# Patient Record
Sex: MALE | Race: ASIAN | NOT HISPANIC OR LATINO
[De-identification: names, ages, dates, MRNs, and addresses within clinical notes are randomized per-mention and may not be internally consistent; named-entity substitution may affect disease eponyms.]

---

## 2021-08-10 PROBLEM — Z00.00 ENCOUNTER FOR PREVENTIVE HEALTH EXAMINATION: Status: ACTIVE | Noted: 2021-08-10

## 2021-09-29 ENCOUNTER — APPOINTMENT (OUTPATIENT)
Dept: GASTROENTEROLOGY | Facility: CLINIC | Age: 61
End: 2021-09-29
Payer: COMMERCIAL

## 2021-09-29 VITALS
HEART RATE: 98 BPM | OXYGEN SATURATION: 98 % | BODY MASS INDEX: 26.48 KG/M2 | HEIGHT: 70 IN | SYSTOLIC BLOOD PRESSURE: 120 MMHG | WEIGHT: 185 LBS | DIASTOLIC BLOOD PRESSURE: 80 MMHG | TEMPERATURE: 98 F

## 2021-09-29 DIAGNOSIS — Z78.9 OTHER SPECIFIED HEALTH STATUS: ICD-10-CM

## 2021-09-29 DIAGNOSIS — R10.13 EPIGASTRIC PAIN: ICD-10-CM

## 2021-09-29 DIAGNOSIS — M1A.9XX0 CHRONIC GOUT, UNSPECIFIED, W/OUT TOPHUS (TOPHI): ICD-10-CM

## 2021-09-29 DIAGNOSIS — R14.0 ABDOMINAL DISTENSION (GASEOUS): ICD-10-CM

## 2021-09-29 PROCEDURE — 99204 OFFICE O/P NEW MOD 45 MIN: CPT

## 2021-09-29 NOTE — HISTORY OF PRESENT ILLNESS
[Nausea] : denies nausea [Vomiting] : denies vomiting [Diarrhea] : denies diarrhea [Constipation] : denies constipation [Yellow Skin Or Eyes (Jaundice)] : denies jaundice [Abdominal Pain] : denies abdominal pain [Abdominal Swelling] : denies abdominal swelling [Rectal Pain] : denies rectal pain [Wt Loss ___ Lbs] : no recent weight loss [Heartburn] : heartburn [_________] : Performed [unfilled] [de-identified] : This is a 61 year old male with no significant medical problems, who presents for evaluation of dyspepsia, bloating. \par \par He reports increased heartburn recently, manifested by just burning. Occasionally he feels some acid regurgitation. The burning has been ongoing for about 1 year. Mostly it occurs when he lays down to go to bed, and not really after meals during the day. He reports increased bloating that also has been occurring for the same amount of time as the dyspepsia. He has been taking omeprazole intermittently. He denies dysphagia. He He reports normal BMs, no diarrhea, no melena/hematochezia. He goes to bed around 10PM at night, dinner is usually around 6:30PM and he does not eat after dinner. He takes no NSAIDs. No abnormal weight loss. He has had colonoscopy 2 years ago and was told there were polyps and was told to return in 5 years. He has not had EGD before. \par \par He does eat some cheese but not mlik. No gum chewing or sodas. He eats a normal American breakfast (sausages, etc), lunch will be pizza or fast food, dinner with be meats/with some carbohydrates. No recurrent fruits or specific foods.\par \par Recent labs:\par 10/2/20\par Na 139, K 4.3, Cl 102, bciarb 25, BUN 15, Cr 1.12, Ca 9.8, T protein 7., albumin 4.4, T bili 0.7, ALP 60, AST 17, ALT 16\par  [de-identified] : Polyps were reported, no pathology report available for review. Repeat in 5 years.

## 2021-09-29 NOTE — ASSESSMENT
[FreeTextEntry1] : Impression:\par 1. Dyspepsia - no alarm symptoms; differential includes GERD, hiatal hernia, H pylori infection, functional dyspepsia; no symptoms to suggest neoplastic process\par 2. Bloating - differential includes H pylori infection, SIBO, functional symptoms, lactose intolerance\par \par Plan:\par -Given no alarm symptoms, recommended test and treat approach first for H pylori\par -Will obtain H pylori breath test\par -If negative for H pylori, trial of PPI. Discussed that he should not take anti-secretory  medications prior to H pylori testing as it will result in false negative results. Discussed that PPI should be initially taken daily on empty stomach\par -If symptoms continue, would pursue endoscopic evaluation. Discussed risks of bleeding/infection/missed lesions/perforation. He would like to pursue noninvasive testing first\par -Advised lifestyle modifications for reflux. Asked patient to avoid eating 3 hours before going to bed or laying down, and to elevate the head of his bed.\par -Advised cutting out dairy products for now\par -Repeat colonoscopy in 2023 as per previous colonoscopy report recommendations\par \par Will touch back with patient after H pylori testing to see when next follow-up would be

## 2022-03-01 ENCOUNTER — OUTPATIENT (OUTPATIENT)
Dept: OUTPATIENT SERVICES | Facility: HOSPITAL | Age: 62
LOS: 1 days | Discharge: HOME | End: 2022-03-01

## 2022-03-01 ENCOUNTER — NON-APPOINTMENT (OUTPATIENT)
Age: 62
End: 2022-03-01

## 2022-03-01 DIAGNOSIS — C16.9 MALIGNANT NEOPLASM OF STOMACH, UNSPECIFIED: ICD-10-CM

## 2022-03-02 ENCOUNTER — APPOINTMENT (OUTPATIENT)
Dept: SURGICAL ONCOLOGY | Facility: CLINIC | Age: 62
End: 2022-03-02
Payer: COMMERCIAL

## 2022-03-02 ENCOUNTER — APPOINTMENT (OUTPATIENT)
Dept: SURGICAL ONCOLOGY | Facility: HOSPITAL | Age: 62
End: 2022-03-02

## 2022-03-02 ENCOUNTER — APPOINTMENT (OUTPATIENT)
Dept: HEMATOLOGY ONCOLOGY | Facility: CLINIC | Age: 62
End: 2022-03-02
Payer: COMMERCIAL

## 2022-03-02 ENCOUNTER — APPOINTMENT (OUTPATIENT)
Dept: GASTROENTEROLOGY | Facility: CLINIC | Age: 62
End: 2022-03-02
Payer: COMMERCIAL

## 2022-03-02 VITALS
BODY MASS INDEX: 27.2 KG/M2 | SYSTOLIC BLOOD PRESSURE: 153 MMHG | DIASTOLIC BLOOD PRESSURE: 99 MMHG | TEMPERATURE: 97.9 F | HEIGHT: 70 IN | OXYGEN SATURATION: 97 % | HEART RATE: 82 BPM | WEIGHT: 190 LBS | RESPIRATION RATE: 17 BRPM

## 2022-03-02 VITALS
SYSTOLIC BLOOD PRESSURE: 154 MMHG | WEIGHT: 184.06 LBS | TEMPERATURE: 98.1 F | DIASTOLIC BLOOD PRESSURE: 85 MMHG | HEIGHT: 70.47 IN | HEART RATE: 85 BPM | RESPIRATION RATE: 17 BRPM | BODY MASS INDEX: 26.06 KG/M2 | OXYGEN SATURATION: 97 %

## 2022-03-02 PROCEDURE — 99205 OFFICE O/P NEW HI 60 MIN: CPT

## 2022-03-02 PROCEDURE — 99442: CPT

## 2022-03-02 PROCEDURE — 99204 OFFICE O/P NEW MOD 45 MIN: CPT

## 2022-03-02 RX ORDER — ALLOPURINOL 100 MG/1
100 TABLET ORAL
Refills: 0 | Status: COMPLETED | COMMUNITY
End: 2022-03-02

## 2022-03-02 NOTE — CONSULT LETTER
[FreeTextEntry2] : Dr. Juan Antonio Steinberg [FreeTextEntry3] : Jose Luis Ramirez\par (M) [phone]\par (O) [phone]

## 2022-03-02 NOTE — HISTORY OF PRESENT ILLNESS
[de-identified] : Mr. Burden is a 63 y/o male wth history of dyspepsia/acid reflux.  He was recently found to be slightly anemic on routine blood work.  He underwent his first EGD by Dr. 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

## 2022-03-02 NOTE — ASSESSMENT
[FreeTextEntry1] : 61 y/o male with recent diagnosis of gastric cancer.\par Endoscopy/pathology/CT imaging reviewed with the patient.\par I suspect this is an early gastric cancer.\par Will complete staging with EUS and PET/CT scan.  Will coordinate referral to advanced GI.\par Review pathology slides at [facility].\par Pending workup will determine treatment strategy:\par ESD for intramucosal cancer.\par Distal gastrectomy for early node negative cancer with adjuvant treatment as indicated.\par Perioperative chemotherapy/surgery for thicker primary/node positive cancer.\par Patient and his daughter express understanding and wish to proceed.\par All questions answered.

## 2022-03-03 ENCOUNTER — RESULT REVIEW (OUTPATIENT)
Age: 62
End: 2022-03-03

## 2022-03-03 RX ORDER — ALLOPURINOL 300 MG/1
300 TABLET ORAL
Qty: 90 | Refills: 0 | Status: DISCONTINUED | COMMUNITY
Start: 2021-10-19

## 2022-03-03 RX ORDER — METRONIDAZOLE 250 MG/1
250 TABLET ORAL
Qty: 40 | Refills: 0 | Status: DISCONTINUED | COMMUNITY
Start: 2022-02-24

## 2022-03-03 RX ORDER — LOSARTAN POTASSIUM 25 MG/1
25 TABLET, FILM COATED ORAL
Refills: 0 | Status: COMPLETED | COMMUNITY
End: 2022-03-03

## 2022-03-03 RX ORDER — AMLODIPINE BESYLATE 10 MG/1
10 TABLET ORAL
Refills: 0 | Status: COMPLETED | COMMUNITY
End: 2022-03-03

## 2022-03-03 RX ORDER — TETRACYCLINE HYDROCHLORIDE 500 MG/1
500 CAPSULE ORAL
Qty: 40 | Refills: 0 | Status: DISCONTINUED | COMMUNITY
Start: 2022-02-24

## 2022-03-03 RX ORDER — OMEPRAZOLE 20 MG/1
20 CAPSULE, DELAYED RELEASE ORAL
Qty: 30 | Refills: 0 | Status: DISCONTINUED | COMMUNITY
Start: 2021-10-19

## 2022-03-03 RX ORDER — POLYETHYLENE GLYCOL-3350 AND ELECTROLYTES WITH FLAVOR PACK 240; 5.84; 2.98; 6.72; 22.72 G/278.26G; G/278.26G; G/278.26G; G/278.26G; G/278.26G
240 POWDER, FOR SOLUTION ORAL
Qty: 4000 | Refills: 0 | Status: DISCONTINUED | COMMUNITY
Start: 2022-02-24

## 2022-03-03 RX ORDER — COLCHICINE 0.6 MG/1
0.6 TABLET ORAL
Qty: 180 | Refills: 0 | Status: DISCONTINUED | COMMUNITY
Start: 2021-11-10

## 2022-03-03 NOTE — PHYSICAL EXAM
[Fully active, able to carry on all pre-disease performance without restriction] : Status 0 - Fully active, able to carry on all pre-disease performance without restriction [Normal] : affect appropriate [de-identified] : anicteric  [de-identified] : no jVD  [de-identified] : normal respiratory effort, no audible wheeze [de-identified] : reg rate

## 2022-03-03 NOTE — CONSULT LETTER
[Dear  ___] : Dear  [unfilled], [Consult Letter:] : I had the pleasure of evaluating your patient, [unfilled]. [Please see my note below.] : Please see my note below. [Consult Closing:] : Thank you very much for allowing me to participate in the care of this patient.  If you have any questions, please do not hesitate to contact me. [Sincerely,] : Sincerely, [FreeTextEntry3] : Parag Rivera MD, FACP \par  of Medicine \par Division of Hematology/Oncology\par [facility]\par [facility] \par [address]\[place] [address]\par Tel: [phone]\par Fax: [phone]\par \par \par

## 2022-03-03 NOTE — REASON FOR VISIT
[Initial Consultation] : an initial consultation [Family Member] : family member [FreeTextEntry2] : Gastric Cancer

## 2022-03-03 NOTE — HISTORY OF PRESENT ILLNESS
[Disease: _____________________] : Disease: [unfilled] [de-identified] : Patient is a 61 y/o M with known PMHx of HTN and pre-diabetes who first presented to a GI, Dr. Paul, 9/29/21 c/o abdominal bloating and heart burn symptoms especially at night progressive for ~ 2 year.  He was initially managed conservatively with an ordered H. pylori test (patient never went for testing), trial of PPI and life style modifications. Patient states that he saw his PCP in October 2021 for routine follow up and was noted to have a drop in his Hgb from 13.9 to 12.3.  He was started on OTC iron supplements and repeat labs 1/2022 were improved.  Given the HANANE and the GI symptoms, the patient returned to a GI for re-evaluation. The patient presented to Dr. Juan Antonio Steinberg on 2/20/22 and an endoscopy was performed on 2/21/22.  The EGD showed evidence of reflux esophagitis at the EG-junction, nonactive mild gastritis in the antrum and a large ulcer was seen at incisura.  Biopsies taken at the stomach antrum showed active chronic H. pylori gastritis with intestinal metaplasia.  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  A CT A/P performed on 2/25/22 showed a sequela of small airways inflammatory disease/bronchiolitis within the left lower lobe and to a lesser extent right middle and right lower lobes of the lungs. The stomach was only partially distended but there was no definite gastric mass identified.  There was otherwise no evidence of metastatic disease in the abdomen.  The patient started treatment for H. pylori earlier this week and a colonoscopy was performed 2 days ago with a polyp removed and results pending.  The patient presents today to establish oncologic care.\par \par SHx - previous tobacco use - 7.5 pack year history; quit 15 yrs ago;  current alcohol use ~ 3 beers/ day\par COVID - vaccine x 3 (booster 12/2021)\par FHx - no known cancer history [de-identified] : moderately differentiated intramucosal carcinoma seen with anastomosing glands lined by cells with hyperchromatic and pleomorphic nuclei.  No submucosa is present.  A background of gastritis and acute inflammatory exudate is noted [de-identified] : CEA

## 2022-03-08 ENCOUNTER — OUTPATIENT (OUTPATIENT)
Dept: OUTPATIENT SERVICES | Facility: HOSPITAL | Age: 62
LOS: 1 days | End: 2022-03-08
Payer: COMMERCIAL

## 2022-03-08 ENCOUNTER — RESULT REVIEW (OUTPATIENT)
Age: 62
End: 2022-03-08

## 2022-03-08 ENCOUNTER — APPOINTMENT (OUTPATIENT)
Dept: GASTROENTEROLOGY | Facility: HOSPITAL | Age: 62
End: 2022-03-08

## 2022-03-08 VITALS
DIASTOLIC BLOOD PRESSURE: 79 MMHG | OXYGEN SATURATION: 100 % | RESPIRATION RATE: 12 BRPM | SYSTOLIC BLOOD PRESSURE: 113 MMHG | HEART RATE: 65 BPM

## 2022-03-08 VITALS
HEIGHT: 70 IN | RESPIRATION RATE: 20 BRPM | WEIGHT: 186.07 LBS | DIASTOLIC BLOOD PRESSURE: 87 MMHG | OXYGEN SATURATION: 97 % | HEART RATE: 75 BPM | SYSTOLIC BLOOD PRESSURE: 124 MMHG | TEMPERATURE: 98 F

## 2022-03-08 DIAGNOSIS — C16.9 MALIGNANT NEOPLASM OF STOMACH, UNSPECIFIED: ICD-10-CM

## 2022-03-08 PROCEDURE — 88341 IMHCHEM/IMCYTCHM EA ADD ANTB: CPT

## 2022-03-08 PROCEDURE — 43239 EGD BIOPSY SINGLE/MULTIPLE: CPT

## 2022-03-08 PROCEDURE — 43239 EGD BIOPSY SINGLE/MULTIPLE: CPT | Mod: 59

## 2022-03-08 PROCEDURE — 88305 TISSUE EXAM BY PATHOLOGIST: CPT | Mod: 26

## 2022-03-08 PROCEDURE — 88341 IMHCHEM/IMCYTCHM EA ADD ANTB: CPT | Mod: 26

## 2022-03-08 PROCEDURE — 43259 EGD US EXAM DUODENUM/JEJUNUM: CPT

## 2022-03-08 PROCEDURE — 88342 IMHCHEM/IMCYTCHM 1ST ANTB: CPT

## 2022-03-08 PROCEDURE — 88342 IMHCHEM/IMCYTCHM 1ST ANTB: CPT | Mod: 26

## 2022-03-08 PROCEDURE — 43236 UPPR GI SCOPE W/SUBMUC INJ: CPT | Mod: 59

## 2022-03-08 PROCEDURE — 88305 TISSUE EXAM BY PATHOLOGIST: CPT

## 2022-03-08 RX ORDER — SODIUM CHLORIDE 9 MG/ML
500 INJECTION INTRAMUSCULAR; INTRAVENOUS; SUBCUTANEOUS
Refills: 0 | Status: COMPLETED | OUTPATIENT
Start: 2022-03-08 | End: 2022-03-08

## 2022-03-08 RX ADMIN — SODIUM CHLORIDE 75 MILLILITER(S): 9 INJECTION INTRAMUSCULAR; INTRAVENOUS; SUBCUTANEOUS at 13:10

## 2022-03-08 NOTE — ASU DISCHARGE PLAN (ADULT/PEDIATRIC) - NSDISCH_ENDOSCOPY_ENDO_ALL_CORE_FT
Patient: Harvey Lucero Jr  Location: [facility] 3C 0362  MRN: [id number]  Today's date: 1/6/2019    Falls alarm set    Prior Living Environment  Lives With: spouse, child(maeve), dependent  Living Arrangements: house (3 story)  Home Accessibility: stairs to enter home (6 steps) Equipment Currently Used at Home: none       Prior Level of Function  Ambulation: assistive equipment  Transferring: independent  Toileting: independent  Bathing: independent  Dressing: independent  Eating: independent  Communication: understands/communicates without difficulty  Swallowing: swallows foods/liquids without difficulty  Equipment Currently Used at Home: none           OT Evaluation - 01/06/19 0900        Session Details    Document Type initial evaluation    Mode of Treatment occupational therapy       Orientation Log    Comment AAOx3       Attention    Behavioral Observations completes tasks of short duration with cueing;WFL   pt fatigued, mildly irritable, follows simple directions       Vision Assessment/Intervention    Visual Impairment/Limitations corrective lenses for reading       Bed Mobility    Bed Mobility bed mobility activities    [place], Sit to Supine supervision;1 person assist    Comment (Bed Mobility) pain in foot / NWB retrictions folllowed       Lower Body Dressing    Lower Body Dressing [place] minimum assist (75% or more patient effort)    Comment mild difficulty reaching affected LE       AM-PAC (TM) - ADL (Current Function)    Putting on and taking off regular lower body clothing? 3 - A Little    Bathing? 3 - A Little    Toileting? 3 - A Little    Putting on/taking off regular upper body clothing? 4 - None    How much help for taking care of personal grooming? 4 - None    Eating meals? 4 - None    AM-PAC (TM) ADL Score 21       OT Clinical Impression    Patient's Goals For Discharge return to all previous roles/activities    Family Goals For Discharge patient able to return to all previous  activities/roles    Plan For Care Reviewed: Occupational Therapy OT plan for care discussed with patient    System Pathology/Pathophysiology Noted musculoskeletal;neuromuscular    Impairments Found (OT Eval) aerobic capacity/endurance;motor function;muscle performance    Functional Limitations in Following Categories self-care;home management    Disability: Inability to Perform Actions/Activities of Required Roles (OT) community/leisure    Activity tolerance compared to PLOF Participating in >75% of school/work/household responsibilities and leisure activities, may have accommodations    Rehab Potential/Prognosis: Occupational Therapy good, to achieve stated therapy goals    OT Frequency of Treatment 3-5 times per week    Estimated Length of Stay 1 week    Problem List: Occupational Therapy strength decreased;pain    Activity Limitations Related to Problem List ambulation not performed safely;BADL activities not performed adequately or safely    Anticipated Equipment Needs at Discharge bathing equipment;toileting equipment;bedside commode;front wheeled walker;shower chair;dressing equipment    Expected Discharge Disposition home with assist    Daily Outcome Statement Pt benefits from light A x1 for ADLs and functional transfers                        Education provided this session. See the Patient Education summary report for full details.    OT Care Plan Goals      Most Recent Value   Bed Mobility Goal   Time to Achieve Goal: Bed Mobility  5 - 7 days   Level of [place] Goal: Bed Mobility  modified independence   Goal Outcome: Bed Mobility  goal ongoing   Lower Body Dressing Goal   Time to Achieve Goal: Lower Body Dressing  5 - 7 days   Level of [place]  modified independence   Adaptive Equipment: Lower Body Dressing  dressing stick, shoe horn, long handled, sock-aid, reacher   Goal Outcome: Lower Body Dressing  goal ongoing   Toilet Transfer Goal   Time to Achieve Goal: Toilet Transfer Training  5 - 7  days   Assistive Device: Toilet Transfer Training  grab bars/safety frame, raised toilet seat, walker, front-wheeled   Goal Outcome: Toilet Transfer Training  goal ongoing          If an upper endoscopy or enteroscopy was performed, you might have a sore throat for 1 to 2 days after the procedure. If it does not improve, please contact your doctor.

## 2022-03-08 NOTE — ASU PREOP CHECKLIST - TEMPERATURE IN FAHRENHEIT (DEGREES F)
-neurology onboard, appreciate recommendations  -MRI L/S spine w/wo contrast limited by motion artifact, benign (negative for OM, abscess, or spinal stenosis)  -f/u MR brain, cervical and thoracic spine  -f/u screen for HSV,1/2, VZV (+former infection), CMV (negative), WNV and syphilis (negative), lyme (negative), B12 (wnl), folate (wnl).     -PT consult- recs rehab  - Patient refused MRI 6/9/21  - LE weakness improving  - cleared from neuro perspective for discharge  - patient pending placement at [facility] for HIV 97.7

## 2022-03-08 NOTE — ASU DISCHARGE PLAN (ADULT/PEDIATRIC) - NS MD DC FALL RISK RISK
For information on Fall & Injury Prevention, visit: [email or web address] OR  [email or web address] OR  https://www.cdc.gov/steadi/patient.html

## 2022-03-08 NOTE — PRE PROCEDURE NOTE - PRE PROCEDURE EVALUATION
Pre-Endoscopy Evaluation    Attending Physician: Tyrone    Procedure: EGD +  EUS     Indication for Procedure: gastric adenocarcinoma    Pertinent History: See Allscripts chart    PAST MEDICAL & SURGICAL HISTORY:      Allergies    penicillins (Unknown)    Intolerances        Medications: MEDICATIONS  (STANDING):    MEDICATIONS  (PRN):      Pertinent lab data:                      Physical Examination:  Daily     Daily   Vital Signs Last 24 Hrs  T(C): --  T(F): --  HR: --  BP: --  BP(mean): --  RR: --  SpO2: --  Constitutional: NAD  HEENT: PERRLA, EOMI,    Neck:  No JVD  Respiratory: CTAB/L  Cardiovascular: S1 and S2  Gastrointestinal: BS+, soft, NT/ND  Extremities: No peripheral edema  Neurological: A/O x 3, no focal deficits  Psychiatric: Normal mood, normal affect  : No Branch  Skin: No rashes    Comments:    ASA Class: I []  II []  III []  IV []    The patient is a suitable candidate for the planned procedure unless box checked [ ]  No, explain:

## 2022-03-08 NOTE — ASU PATIENT PROFILE, ADULT - FALL HARM RISK - UNIVERSAL INTERVENTIONS
Bed in lowest position, wheels locked, appropriate side rails in place/Call bell, personal items and telephone in reach/Instruct patient to call for assistance before getting out of bed or chair/Non-slip footwear when patient is out of bed/[place] to call system/Physically safe environment - no spills, clutter or unnecessary equipment/Purposeful Proactive Rounding/Room/bathroom lighting operational, light cord in reach

## 2022-03-09 LAB — SURGICAL PATHOLOGY STUDY: SIGNIFICANT CHANGE UP

## 2022-03-11 ENCOUNTER — NON-APPOINTMENT (OUTPATIENT)
Age: 62
End: 2022-03-11

## 2022-03-14 ENCOUNTER — OUTPATIENT (OUTPATIENT)
Dept: OUTPATIENT SERVICES | Facility: HOSPITAL | Age: 62
LOS: 1 days | End: 2022-03-14
Payer: COMMERCIAL

## 2022-03-14 ENCOUNTER — APPOINTMENT (OUTPATIENT)
Dept: NUCLEAR MEDICINE | Facility: IMAGING CENTER | Age: 62
End: 2022-03-14
Payer: COMMERCIAL

## 2022-03-14 ENCOUNTER — TRANSCRIPTION ENCOUNTER (OUTPATIENT)
Age: 62
End: 2022-03-14

## 2022-03-14 DIAGNOSIS — C16.9 MALIGNANT NEOPLASM OF STOMACH, UNSPECIFIED: ICD-10-CM

## 2022-03-14 PROBLEM — I10 ESSENTIAL (PRIMARY) HYPERTENSION: Chronic | Status: ACTIVE | Noted: 2022-03-08

## 2022-03-14 PROCEDURE — 78815 PET IMAGE W/CT SKULL-THIGH: CPT

## 2022-03-14 PROCEDURE — 78815 PET IMAGE W/CT SKULL-THIGH: CPT | Mod: 26,PI

## 2022-03-14 PROCEDURE — A9552: CPT

## 2022-03-15 DIAGNOSIS — M25.852 OTHER SPECIFIED JOINT DISORDERS, LEFT HIP: ICD-10-CM

## 2022-03-16 PROBLEM — Z87.891 FORMER SMOKER: Status: ACTIVE | Noted: 2022-03-16

## 2022-03-16 PROBLEM — Z78.9 SOCIAL ALCOHOL USE: Status: ACTIVE | Noted: 2022-03-16

## 2022-03-16 PROBLEM — Z86.79 HISTORY OF HYPERTENSION: Status: RESOLVED | Noted: 2022-03-02 | Resolved: 2022-03-16

## 2022-03-17 ENCOUNTER — APPOINTMENT (OUTPATIENT)
Dept: INTERVENTIONAL RADIOLOGY/VASCULAR | Facility: CLINIC | Age: 62
End: 2022-03-17

## 2022-03-17 DIAGNOSIS — Z87.891 PERSONAL HISTORY OF NICOTINE DEPENDENCE: ICD-10-CM

## 2022-03-17 DIAGNOSIS — R91.8 OTHER NONSPECIFIC ABNORMAL FINDING OF LUNG FIELD: ICD-10-CM

## 2022-03-17 DIAGNOSIS — Z78.9 OTHER SPECIFIED HEALTH STATUS: ICD-10-CM

## 2022-03-17 DIAGNOSIS — Z86.79 PERSONAL HISTORY OF OTHER DISEASES OF THE CIRCULATORY SYSTEM: ICD-10-CM

## 2022-03-17 LAB — SURGICAL PATHOLOGY STUDY: SIGNIFICANT CHANGE UP

## 2022-03-17 PROCEDURE — XXXXX: CPT | Mod: 1L

## 2022-03-22 ENCOUNTER — LABORATORY RESULT (OUTPATIENT)
Age: 62
End: 2022-03-22

## 2022-03-24 LAB
ANION GAP SERPL CALC-SCNC: 15 MMOL/L
BUN SERPL-MCNC: 21 MG/DL
CALCIUM SERPL-MCNC: 9.9 MG/DL
CHLORIDE SERPL-SCNC: 103 MMOL/L
CO2 SERPL-SCNC: 24 MMOL/L
CREAT SERPL-MCNC: 1.32 MG/DL
EGFR: 61 ML/MIN/1.73M2
GLUCOSE SERPL-MCNC: 105 MG/DL
INR PPP: 0.92 RATIO
POTASSIUM SERPL-SCNC: 4.8 MMOL/L
PT BLD: 10.8 SEC
SODIUM SERPL-SCNC: 142 MMOL/L

## 2022-03-25 ENCOUNTER — TRANSCRIPTION ENCOUNTER (OUTPATIENT)
Age: 62
End: 2022-03-25

## 2022-03-25 ENCOUNTER — RESULT REVIEW (OUTPATIENT)
Age: 62
End: 2022-03-25

## 2022-03-25 ENCOUNTER — OUTPATIENT (OUTPATIENT)
Dept: OUTPATIENT SERVICES | Facility: HOSPITAL | Age: 62
LOS: 1 days | End: 2022-03-25
Payer: COMMERCIAL

## 2022-03-25 VITALS
SYSTOLIC BLOOD PRESSURE: 125 MMHG | DIASTOLIC BLOOD PRESSURE: 87 MMHG | HEART RATE: 85 BPM | OXYGEN SATURATION: 99 % | RESPIRATION RATE: 26 BRPM

## 2022-03-25 VITALS
HEART RATE: 107 BPM | TEMPERATURE: 98 F | HEIGHT: 70 IN | WEIGHT: 179.9 LBS | RESPIRATION RATE: 20 BRPM | OXYGEN SATURATION: 98 % | DIASTOLIC BLOOD PRESSURE: 92 MMHG | SYSTOLIC BLOOD PRESSURE: 136 MMHG

## 2022-03-25 DIAGNOSIS — R91.8 OTHER NONSPECIFIC ABNORMAL FINDING OF LUNG FIELD: ICD-10-CM

## 2022-03-25 PROCEDURE — 71045 X-RAY EXAM CHEST 1 VIEW: CPT | Mod: 26

## 2022-03-25 PROCEDURE — 88172 CYTP DX EVAL FNA 1ST EA SITE: CPT

## 2022-03-25 PROCEDURE — 88173 CYTOPATH EVAL FNA REPORT: CPT

## 2022-03-25 PROCEDURE — 71045 X-RAY EXAM CHEST 1 VIEW: CPT

## 2022-03-25 PROCEDURE — 88305 TISSUE EXAM BY PATHOLOGIST: CPT

## 2022-03-25 PROCEDURE — 32408 CORE NDL BX LNG/MED PERQ: CPT

## 2022-03-25 PROCEDURE — 88173 CYTOPATH EVAL FNA REPORT: CPT | Mod: 26

## 2022-03-25 PROCEDURE — 88305 TISSUE EXAM BY PATHOLOGIST: CPT | Mod: 26

## 2022-03-25 NOTE — PRE-ANESTHESIA EVALUATION ADULT - ANESTHESIA, PREVIOUS REACTION, PROFILE
"This is the pt who "left" please call and check on him. Let him know we are sorry for the wait and that he has had a very nice response to the laser . His eye pressure has gone from 20 ou down to 14/16. If possible I would like to see him back in 4 months with a HVF / DFE / OCT "
none

## 2022-03-25 NOTE — ASU DISCHARGE PLAN (ADULT/PEDIATRIC) - NURSING INSTRUCTIONS
Please feel free to contact us at [phone] if any problems arise. After 6PM, Monday through Friday, on weekends and on holidays, please call [phone] and ask for the radiology resident on call to be paged.

## 2022-03-25 NOTE — ASU DISCHARGE PLAN (ADULT/PEDIATRIC) - CALL YOUR DOCTOR IF YOU HAVE ANY OF THE FOLLOWING:
shortness of breath or difficulty breathing/Bleeding that does not stop/Swelling that gets worse/Pain not relieved by Medications/Fever greater than (need to indicate Fahrenheit or Celsius)

## 2022-03-25 NOTE — PROCEDURE NOTE - PROCEDURE FINDINGS AND DETAILS
multiple 22 guage fna of right lower lobe lung lesion. small stable ptx post biopsy. no hematoma. pt o2 sat remained 100% without complaint. will obtain serial chest xray to monitor.

## 2022-03-25 NOTE — ASU PATIENT PROFILE, ADULT - FALL HARM RISK - UNIVERSAL INTERVENTIONS
Bed in lowest position, wheels locked, appropriate side rails in place/Call bell, personal items and telephone in reach/Instruct patient to call for assistance before getting out of bed or chair/Non-slip footwear when patient is out of bed/[place] to call system/Physically safe environment - no spills, clutter or unnecessary equipment/Purposeful Proactive Rounding/Room/bathroom lighting operational, light cord in reach

## 2022-03-25 NOTE — PRE PROCEDURE NOTE - PRE PROCEDURE EVALUATION
Interventional Radiology Pre-Procedure Note    Patient is a 62y old Male with a hx of gastric ca found to have a right lower lobe lung lesion. Pt is here for biopsy.    PAST MEDICAL & SURGICAL HISTORY:  Hypertension    Hypertension         Allergies: penicillins (Unknown)      Procedure/ risks/ benefits were explained, informed consent obtained from patient, verbalizes understanding.

## 2022-03-28 ENCOUNTER — OUTPATIENT (OUTPATIENT)
Dept: OUTPATIENT SERVICES | Facility: HOSPITAL | Age: 62
LOS: 1 days | End: 2022-03-28
Payer: COMMERCIAL

## 2022-03-28 ENCOUNTER — APPOINTMENT (OUTPATIENT)
Dept: MRI IMAGING | Facility: CLINIC | Age: 62
End: 2022-03-28
Payer: COMMERCIAL

## 2022-03-28 DIAGNOSIS — M25.852 OTHER SPECIFIED JOINT DISORDERS, LEFT HIP: ICD-10-CM

## 2022-03-28 PROCEDURE — A9585: CPT

## 2022-03-28 PROCEDURE — 72197 MRI PELVIS W/O & W/DYE: CPT

## 2022-03-28 PROCEDURE — 72197 MRI PELVIS W/O & W/DYE: CPT | Mod: 26

## 2022-03-29 LAB — NON-GYNECOLOGICAL CYTOLOGY STUDY: SIGNIFICANT CHANGE UP

## 2022-03-31 DIAGNOSIS — R91.8 OTHER NONSPECIFIC ABNORMAL FINDING OF LUNG FIELD: ICD-10-CM

## 2022-03-31 DIAGNOSIS — Z85.028 PERSONAL HISTORY OF OTHER MALIGNANT NEOPLASM OF STOMACH: ICD-10-CM

## 2022-04-07 ENCOUNTER — NON-APPOINTMENT (OUTPATIENT)
Age: 62
End: 2022-04-07

## 2022-04-07 ENCOUNTER — APPOINTMENT (OUTPATIENT)
Dept: THORACIC SURGERY | Facility: CLINIC | Age: 62
End: 2022-04-07
Payer: COMMERCIAL

## 2022-04-07 ENCOUNTER — OUTPATIENT (OUTPATIENT)
Dept: OUTPATIENT SERVICES | Facility: HOSPITAL | Age: 62
LOS: 1 days | End: 2022-04-07
Payer: COMMERCIAL

## 2022-04-07 VITALS
HEART RATE: 81 BPM | HEIGHT: 70 IN | TEMPERATURE: 97 F | RESPIRATION RATE: 16 BRPM | DIASTOLIC BLOOD PRESSURE: 80 MMHG | OXYGEN SATURATION: 98 % | SYSTOLIC BLOOD PRESSURE: 130 MMHG | WEIGHT: 179.9 LBS

## 2022-04-07 VITALS
HEART RATE: 65 BPM | BODY MASS INDEX: 25.77 KG/M2 | DIASTOLIC BLOOD PRESSURE: 84 MMHG | RESPIRATION RATE: 16 BRPM | WEIGHT: 180 LBS | SYSTOLIC BLOOD PRESSURE: 125 MMHG | OXYGEN SATURATION: 100 % | HEIGHT: 70 IN

## 2022-04-07 DIAGNOSIS — I10 ESSENTIAL (PRIMARY) HYPERTENSION: ICD-10-CM

## 2022-04-07 DIAGNOSIS — Z98.890 OTHER SPECIFIED POSTPROCEDURAL STATES: Chronic | ICD-10-CM

## 2022-04-07 DIAGNOSIS — C16.9 MALIGNANT NEOPLASM OF STOMACH, UNSPECIFIED: ICD-10-CM

## 2022-04-07 LAB
A1C WITH ESTIMATED AVERAGE GLUCOSE RESULT: 6.1 % — HIGH (ref 4–5.6)
ALBUMIN SERPL ELPH-MCNC: 4.6 G/DL — SIGNIFICANT CHANGE UP (ref 3.3–5)
ALP SERPL-CCNC: 68 U/L — SIGNIFICANT CHANGE UP (ref 40–120)
ALT FLD-CCNC: 16 U/L — SIGNIFICANT CHANGE UP (ref 4–41)
ANION GAP SERPL CALC-SCNC: 15 MMOL/L — HIGH (ref 7–14)
AST SERPL-CCNC: 17 U/L — SIGNIFICANT CHANGE UP (ref 4–40)
BILIRUB SERPL-MCNC: 0.3 MG/DL — SIGNIFICANT CHANGE UP (ref 0.2–1.2)
BLD GP AB SCN SERPL QL: NEGATIVE — SIGNIFICANT CHANGE UP
BUN SERPL-MCNC: 20 MG/DL — SIGNIFICANT CHANGE UP (ref 7–23)
CALCIUM SERPL-MCNC: 10 MG/DL — SIGNIFICANT CHANGE UP (ref 8.4–10.5)
CHLORIDE SERPL-SCNC: 102 MMOL/L — SIGNIFICANT CHANGE UP (ref 98–107)
CO2 SERPL-SCNC: 22 MMOL/L — SIGNIFICANT CHANGE UP (ref 22–31)
CREAT SERPL-MCNC: 1.17 MG/DL — SIGNIFICANT CHANGE UP (ref 0.5–1.3)
EGFR: 70 ML/MIN/1.73M2 — SIGNIFICANT CHANGE UP
ESTIMATED AVERAGE GLUCOSE: 128 — SIGNIFICANT CHANGE UP
GLUCOSE SERPL-MCNC: 148 MG/DL — HIGH (ref 70–99)
HCT VFR BLD CALC: 41.7 % — SIGNIFICANT CHANGE UP (ref 39–50)
HGB BLD-MCNC: 14 G/DL — SIGNIFICANT CHANGE UP (ref 13–17)
MCHC RBC-ENTMCNC: 31.1 PG — SIGNIFICANT CHANGE UP (ref 27–34)
MCHC RBC-ENTMCNC: 33.6 GM/DL — SIGNIFICANT CHANGE UP (ref 32–36)
MCV RBC AUTO: 92.7 FL — SIGNIFICANT CHANGE UP (ref 80–100)
NRBC # BLD: 0 /100 WBCS — SIGNIFICANT CHANGE UP
NRBC # FLD: 0 K/UL — SIGNIFICANT CHANGE UP
PLATELET # BLD AUTO: 265 K/UL — SIGNIFICANT CHANGE UP (ref 150–400)
POTASSIUM SERPL-MCNC: 3.9 MMOL/L — SIGNIFICANT CHANGE UP (ref 3.5–5.3)
POTASSIUM SERPL-SCNC: 3.9 MMOL/L — SIGNIFICANT CHANGE UP (ref 3.5–5.3)
PROT SERPL-MCNC: 7.7 G/DL — SIGNIFICANT CHANGE UP (ref 6–8.3)
RBC # BLD: 4.5 M/UL — SIGNIFICANT CHANGE UP (ref 4.2–5.8)
RBC # FLD: 12.7 % — SIGNIFICANT CHANGE UP (ref 10.3–14.5)
RH IG SCN BLD-IMP: POSITIVE — SIGNIFICANT CHANGE UP
SODIUM SERPL-SCNC: 139 MMOL/L — SIGNIFICANT CHANGE UP (ref 135–145)
WBC # BLD: 9.44 K/UL — SIGNIFICANT CHANGE UP (ref 3.8–10.5)
WBC # FLD AUTO: 9.44 K/UL — SIGNIFICANT CHANGE UP (ref 3.8–10.5)

## 2022-04-07 PROCEDURE — 93010 ELECTROCARDIOGRAM REPORT: CPT

## 2022-04-07 PROCEDURE — 99205 OFFICE O/P NEW HI 60 MIN: CPT

## 2022-04-07 RX ORDER — SODIUM CHLORIDE 9 MG/ML
1000 INJECTION, SOLUTION INTRAVENOUS
Refills: 0 | Status: DISCONTINUED | OUTPATIENT
Start: 2022-04-13 | End: 2022-04-14

## 2022-04-07 RX ORDER — LOSARTAN POTASSIUM 100 MG/1
160 TABLET, FILM COATED ORAL
Qty: 0 | Refills: 0 | DISCHARGE

## 2022-04-07 NOTE — H&P PST ADULT - NSICDXFAMILYHX_GEN_ALL_CORE_FT
FAMILY HISTORY:  Father  Still living? Unknown  FHx: hypertension, Age at diagnosis: Age Unknown

## 2022-04-07 NOTE — H&P PST ADULT - PROBLEM SELECTOR PLAN 1
Pt is tentatively scheduled for  surgery- Diagnostic laparoscopy with biopsy, Mediport insertion with Dr Ramirez. and Dr Cardenas to ADD codes.    Pre-op instructions provided. Pt given verbal and written instructions with teach back on chlorhexidine wash and Pt takes own omeprazole for GI prophylaxis. Pt verbalized understanding with return demonstration.     Pt strongly advised to follow up with surgeon to discuss COVID testing requirements prior to procedure.    MARIAN precautions. Pt is tentatively scheduled for  surgery- Diagnostic laparoscopy with biopsy, Mediport insertion with Dr Ramirez. and Dr Cardenas to ADD codes.    Pre-op instructions provided. Pt given verbal and written instructions with teach back on chlorhexidine wash and Pt takes own omeprazole for GI prophylaxis. Pt verbalized understanding with return demonstration.     Pt strongly advised to follow up with surgeon to discuss COVID testing requirements prior to procedure.    MARIAN precautions.    Check fingerstick DOS

## 2022-04-07 NOTE — H&P PST ADULT - RESPIRATORY AND THORAX COMMENTS
pt reports of intermittent dry cough, recent PET scan showed abnormality in lung - going for lung biopsy

## 2022-04-07 NOTE — H&P PST ADULT - REASON FOR ADMISSION
"I am going for Mediport insertion with Dr Ramirez and Dr Cardenas is going to do lung biopsy same time."

## 2022-04-07 NOTE — H&P PST ADULT - ASSESSMENT
pre op diagnosis of malignant neoplasm of stomach unspecified, for pre op evaluation prior to scheduled surgery- Diagnostic laparoscopy with biopsy, Mediport insertion with Dr Ramirez. and Dr Cardenas to ADD codes.

## 2022-04-07 NOTE — H&P PST ADULT - PROBLEM SELECTOR PLAN 2
Patient instructed to take amlodipine and valsartan with a sip of water on the morning of procedure.

## 2022-04-07 NOTE — H&P PST ADULT - HISTORY OF PRESENT ILLNESS
62 year old male with PMH of HTN, GERD, Anemia, presents to PST, with pre op diagnosis of malignant neoplasm of stomach unspecified, for pre op evaluation prior to scheduled surgery- Diagnostic laparoscopy with biopsy, Mediport insertion with Dr Ramirez. and Dr Cardenas to ADD codes.

## 2022-04-07 NOTE — H&P PST ADULT - ACTIVITY
walks, ADLs, treadmill x20 minutes 4 x week, can climb 1 flight of stairs with no dyspnea or chest pain

## 2022-04-08 RX ORDER — MULTIVITAMIN
TABLET ORAL
Refills: 0 | Status: COMPLETED | COMMUNITY
Start: 2022-03-17 | End: 2022-04-08

## 2022-04-08 NOTE — CONSULT LETTER
[Dear  ___] : Dear  [unfilled], [Consult Letter:] : I had the pleasure of evaluating your patient, [unfilled]. [( Thank you for referring [unfilled] for consultation for _____ )] : Thank you for referring [unfilled] for consultation for [unfilled] [Please see my note below.] : Please see my note below. [Consult Closing:] : Thank you very much for allowing me to participate in the care of this patient.  If you have any questions, please do not hesitate to contact me. [Sincerely,] : Sincerely, [FreeTextEntry2] : Jose Luis Ramirez MD (Surg/Onc) [FreeTextEntry3] : Max Cardenas MD, MPH \par System Director of Thoracic Surgery \par Director of [facility] [place] \par Professor Cardiovascular & Thoracic Surgery  \par [employer] [facility] [employer] [facility]\par

## 2022-04-08 NOTE — HISTORY OF PRESENT ILLNESS
[FreeTextEntry1] : Mr. MARIANELA COLBY, 62 year old male, former smoker, w/ hx of HTN, prediabetic, who presented with newly Dx'ed EUS Staged T3N0 gastric AdenoCA of incisura, who had initial PET scan done with incidentally finding of lung nodules. Pt consulted with IR and now s/p lung nodule biopsy on 3/25/22 which revealed non diagnostic of RLL nodule. \par \par PET/CT on 3/14/22:\par - FDG-avid 1.9 x 1.9 cm in RLL nodular opacity SUV=11.9, previously 1 x 0.9 cm on 2/25/22 scan, rapid growth of this opacity suggests infection, however, its morphology raises concern for a rapidly growing metastasis \par - mildly FDG avid bronchial wall thickening, mucoid impaction, and bronchiectasis in LLL (SUV=4.1)\par and to a lesser extent in the RLL and RML\par - 4 mm mildly FDG avid RUL nodule, SUV=1.4 and adjacent micronodules, suggestive of mucoid impacted distal airways\par - there is a focus of increased FDG activity in Lt hip joint, SUV=10.1 \par \par Pt presents today for CT Sx consultation, referred by Dr. Jose Luis Ramirez. \par \par \par \par \par

## 2022-04-08 NOTE — ASSESSMENT
[FreeTextEntry1] : Mr. MARIANELA COLBY, 62 year old male, former smoker, w/ hx of HTN, prediabetic, who presented with newly Dx'ed EUS Staged T3N0 gastric AdenoCA of incisura, who had initial PET scan done with incidentally finding of lung nodules. Pt consulted with IR and now s/p lung nodule biopsy on 3/25/22 which revealed non diagnostic of RLL nodule. \par \par PET/CT on 3/14/22:\par - FDG-avid 1.9 x 1.9 cm in RLL nodular opacity SUV=11.9, previously 1 x 0.9 cm on 2/25/22 scan, rapid growth of this opacity suggests infection, however, its morphology raises concern for a rapidly growing metastasis \par - mildly FDG avid bronchial wall thickening, mucoid impaction, and bronchiectasis in LLL (SUV=4.1)\par and to a lesser extent in the RLL and RML\par - 4 mm mildly FDG avid RUL nodule, SUV=1.4 and adjacent micronodules, suggestive of mucoid impacted distal airways\par - there is a focus of increased FDG activity in Lt hip joint, SUV=10.1 \par \par I have reviewed the patient's medical records and diagnostic images at time of this office consultation and have made the following recommendation:\par 1. PET/CT reviewed, increasing in size lung nodule, suspicious for malignancy. I recommended joint surgery with Dr. Jose Luis Ramirez, Rt VATS, wedge rxn of RLL, MLND on 4/13/22. Risks and benefits and alternatives explained to patient, all questions answered, patient agreed to proceed with surgery.\par 2. PFTs \par 3. Clearances as per Dr. Ramirez's recommendation\par \par \par  I personally performed the services described in the documentation, reviewed the documentation recorded by the scribe in my presence and it accurately and completely records my words and actions. \par \par I, Yulisa Rodgers, NP, am scribing for and the presence of ENRICO Liriano, the following sections HISTORY OF PRESENT ILLNESS, PAST MEDICAL/FAMILY/SOCIAL HISTORY; REVIEW OF SYSTEMS; VITAL SIGNS; PHYSICAL EXAM; DISPOSITION.\par

## 2022-04-09 PROBLEM — K29.70 GASTRITIS, UNSPECIFIED, WITHOUT BLEEDING: Chronic | Status: ACTIVE | Noted: 2022-04-07

## 2022-04-09 PROBLEM — C16.9 MALIGNANT NEOPLASM OF STOMACH, UNSPECIFIED: Chronic | Status: ACTIVE | Noted: 2022-04-07

## 2022-04-09 PROBLEM — K21.9 GASTRO-ESOPHAGEAL REFLUX DISEASE WITHOUT ESOPHAGITIS: Chronic | Status: ACTIVE | Noted: 2022-04-07

## 2022-04-09 PROBLEM — R91.1 SOLITARY PULMONARY NODULE: Chronic | Status: ACTIVE | Noted: 2022-04-07

## 2022-04-12 NOTE — ASU PATIENT PROFILE, ADULT - NSICDXPASTMEDICALHX_GEN_ALL_CORE_FT
PAST MEDICAL HISTORY:  Adenocarcinoma of stomach     Gastritis, Helicobacter pylori     GERD (gastroesophageal reflux disease)     Hypertension     Lung nodule

## 2022-04-12 NOTE — ASU PATIENT PROFILE, ADULT - ANESTHESIA, PREVIOUS REACTION, PROFILE
Who is calling: Patient is calling with the following issues:  1. [facility] referral has not been completed.  2.Treatment for bilateral legs is not successful.  3. Med refills.   Reason for Call:  See above.  Date of last appointment with primary care: 07/24/20  Okay to leave a detailed message: Yes       none

## 2022-04-12 NOTE — ASU PATIENT PROFILE, ADULT - FALL HARM RISK - UNIVERSAL INTERVENTIONS
Bed in lowest position, wheels locked, appropriate side rails in place/Call bell, personal items and telephone in reach/Non-slip footwear when patient is out of bed/[place]ita to call system/Physically safe environment - no spills, clutter or unnecessary equipment/Purposeful Proactive Rounding/Room/bathroom lighting operational, light cord in reach

## 2022-04-13 ENCOUNTER — RESULT REVIEW (OUTPATIENT)
Age: 62
End: 2022-04-13

## 2022-04-13 ENCOUNTER — APPOINTMENT (OUTPATIENT)
Dept: THORACIC SURGERY | Facility: HOSPITAL | Age: 62
End: 2022-04-13

## 2022-04-13 ENCOUNTER — INPATIENT (INPATIENT)
Facility: HOSPITAL | Age: 62
LOS: 1 days | Discharge: HOME | End: 2022-04-15
Attending: THORACIC SURGERY (CARDIOTHORACIC VASCULAR SURGERY) | Admitting: THORACIC SURGERY (CARDIOTHORACIC VASCULAR SURGERY)
Payer: COMMERCIAL

## 2022-04-13 ENCOUNTER — APPOINTMENT (OUTPATIENT)
Dept: SURGICAL ONCOLOGY | Facility: HOSPITAL | Age: 62
End: 2022-04-13

## 2022-04-13 VITALS
DIASTOLIC BLOOD PRESSURE: 87 MMHG | RESPIRATION RATE: 16 BRPM | WEIGHT: 179.9 LBS | HEART RATE: 72 BPM | TEMPERATURE: 97 F | HEIGHT: 70 IN | SYSTOLIC BLOOD PRESSURE: 134 MMHG | OXYGEN SATURATION: 98 %

## 2022-04-13 DIAGNOSIS — Z98.890 OTHER SPECIFIED POSTPROCEDURAL STATES: Chronic | ICD-10-CM

## 2022-04-13 DIAGNOSIS — C16.9 MALIGNANT NEOPLASM OF STOMACH, UNSPECIFIED: ICD-10-CM

## 2022-04-13 LAB
GLUCOSE BLDC GLUCOMTR-MCNC: 111 MG/DL — HIGH (ref 70–99)
GRAM STN FLD: SIGNIFICANT CHANGE UP
NIGHT BLUE STAIN TISS: SIGNIFICANT CHANGE UP
SPECIMEN SOURCE: SIGNIFICANT CHANGE UP
SPECIMEN SOURCE: SIGNIFICANT CHANGE UP

## 2022-04-13 PROCEDURE — 88112 CYTOPATH CELL ENHANCE TECH: CPT | Mod: 26

## 2022-04-13 PROCEDURE — 49321 LAPAROSCOPY BIOPSY: CPT

## 2022-04-13 PROCEDURE — 31645 BRNCHSC W/THER ASPIR 1ST: CPT

## 2022-04-13 PROCEDURE — 88307 TISSUE EXAM BY PATHOLOGIST: CPT | Mod: 26

## 2022-04-13 PROCEDURE — 88312 SPECIAL STAINS GROUP 1: CPT | Mod: 26

## 2022-04-13 PROCEDURE — 88305 TISSUE EXAM BY PATHOLOGIST: CPT | Mod: 26

## 2022-04-13 PROCEDURE — 36561 INSERT TUNNELED CV CATH: CPT

## 2022-04-13 PROCEDURE — 71045 X-RAY EXAM CHEST 1 VIEW: CPT | Mod: 26

## 2022-04-13 PROCEDURE — 32666 THORACOSCOPY W/WEDGE RESECT: CPT | Mod: RT

## 2022-04-13 DEVICE — CHEST DRAIN THORACIC ARGYLE PVC 28FR STRAIGHT: Type: IMPLANTABLE DEVICE | Status: FUNCTIONAL

## 2022-04-13 DEVICE — STAPLER COVIDIEN TRI-STAPLE 60MM BLACK RELOAD: Type: IMPLANTABLE DEVICE | Status: FUNCTIONAL

## 2022-04-13 DEVICE — PORT POWER ISP MRI 9.6SI: Type: IMPLANTABLE DEVICE | Status: FUNCTIONAL

## 2022-04-13 DEVICE — STAPLER COVIDIEN TRI-STAPLE CURVED 60MM PURPLE RELOAD: Type: IMPLANTABLE DEVICE | Status: FUNCTIONAL

## 2022-04-13 RX ORDER — HYDROMORPHONE HYDROCHLORIDE 2 MG/ML
0.5 INJECTION INTRAMUSCULAR; INTRAVENOUS; SUBCUTANEOUS
Refills: 0 | Status: DISCONTINUED | OUTPATIENT
Start: 2022-04-13 | End: 2022-04-14

## 2022-04-13 RX ORDER — GABAPENTIN 400 MG/1
300 CAPSULE ORAL ONCE
Refills: 0 | Status: COMPLETED | OUTPATIENT
Start: 2022-04-13 | End: 2022-04-13

## 2022-04-13 RX ORDER — VALSARTAN 80 MG/1
160 TABLET ORAL DAILY
Refills: 0 | Status: DISCONTINUED | OUTPATIENT
Start: 2022-04-13 | End: 2022-04-15

## 2022-04-13 RX ORDER — HYDROMORPHONE HYDROCHLORIDE 2 MG/ML
0.5 INJECTION INTRAMUSCULAR; INTRAVENOUS; SUBCUTANEOUS
Refills: 0 | Status: DISCONTINUED | OUTPATIENT
Start: 2022-04-13 | End: 2022-04-13

## 2022-04-13 RX ORDER — ONDANSETRON 8 MG/1
4 TABLET, FILM COATED ORAL EVERY 6 HOURS
Refills: 0 | Status: DISCONTINUED | OUTPATIENT
Start: 2022-04-13 | End: 2022-04-15

## 2022-04-13 RX ORDER — ONDANSETRON 8 MG/1
4 TABLET, FILM COATED ORAL ONCE
Refills: 0 | Status: DISCONTINUED | OUTPATIENT
Start: 2022-04-13 | End: 2022-04-13

## 2022-04-13 RX ORDER — PANTOPRAZOLE SODIUM 20 MG/1
40 TABLET, DELAYED RELEASE ORAL
Refills: 0 | Status: DISCONTINUED | OUTPATIENT
Start: 2022-04-13 | End: 2022-04-15

## 2022-04-13 RX ORDER — HYDROMORPHONE HYDROCHLORIDE 2 MG/ML
30 INJECTION INTRAMUSCULAR; INTRAVENOUS; SUBCUTANEOUS
Refills: 0 | Status: DISCONTINUED | OUTPATIENT
Start: 2022-04-13 | End: 2022-04-14

## 2022-04-13 RX ORDER — NALOXONE HYDROCHLORIDE 4 MG/.1ML
0.1 SPRAY NASAL
Refills: 0 | Status: DISCONTINUED | OUTPATIENT
Start: 2022-04-13 | End: 2022-04-15

## 2022-04-13 RX ORDER — SENNA PLUS 8.6 MG/1
2 TABLET ORAL AT BEDTIME
Refills: 0 | Status: DISCONTINUED | OUTPATIENT
Start: 2022-04-13 | End: 2022-04-15

## 2022-04-13 RX ORDER — ACETAMINOPHEN 500 MG
975 TABLET ORAL ONCE
Refills: 0 | Status: COMPLETED | OUTPATIENT
Start: 2022-04-13 | End: 2022-04-13

## 2022-04-13 RX ORDER — AMLODIPINE BESYLATE 2.5 MG/1
2.5 TABLET ORAL DAILY
Refills: 0 | Status: DISCONTINUED | OUTPATIENT
Start: 2022-04-13 | End: 2022-04-15

## 2022-04-13 RX ORDER — ACETAMINOPHEN 500 MG
975 TABLET ORAL EVERY 6 HOURS
Refills: 0 | Status: DISCONTINUED | OUTPATIENT
Start: 2022-04-13 | End: 2022-04-15

## 2022-04-13 RX ORDER — HEPARIN SODIUM 5000 [USP'U]/ML
5000 INJECTION INTRAVENOUS; SUBCUTANEOUS EVERY 8 HOURS
Refills: 0 | Status: DISCONTINUED | OUTPATIENT
Start: 2022-04-13 | End: 2022-04-15

## 2022-04-13 RX ADMIN — HYDROMORPHONE HYDROCHLORIDE 30 MILLILITER(S): 2 INJECTION INTRAMUSCULAR; INTRAVENOUS; SUBCUTANEOUS at 15:27

## 2022-04-13 RX ADMIN — Medication 975 MILLIGRAM(S): at 09:30

## 2022-04-13 RX ADMIN — HEPARIN SODIUM 5000 UNIT(S): 5000 INJECTION INTRAVENOUS; SUBCUTANEOUS at 20:31

## 2022-04-13 RX ADMIN — SODIUM CHLORIDE 30 MILLILITER(S): 9 INJECTION, SOLUTION INTRAVENOUS at 09:29

## 2022-04-13 RX ADMIN — Medication 975 MILLIGRAM(S): at 17:59

## 2022-04-13 RX ADMIN — GABAPENTIN 300 MILLIGRAM(S): 400 CAPSULE ORAL at 09:29

## 2022-04-13 NOTE — BRIEF OPERATIVE NOTE - OPERATION/FINDINGS
- Diagnostic Laparoscopy, Omental biopsy, Intraperitoneal washings sent for cytology  - Right subclavian mediport placement
FB, wedge of RLL nodule. Frank: granuloma  28F R CT, sxn

## 2022-04-13 NOTE — CHART NOTE - NSCHARTNOTEFT_GEN_A_CORE
Patient s/p flex bronch, right vats, right lower lobe wedge resection.  S/p diagnostic Laparoscopy, Omental biopsy, Intraperitoneal washings sent for cytology, Right subclavian mediport placement with general surgery.  Patient resting, IV PCA in place.  Chest tube to suction, no air leak noted.  Incision with dressing clean and dry. Patient tolerating po, voiding.    Vital Signs Last 24 Hrs  T(C): 36.7 (13 Apr 2022 23:30), Max: 37 (13 Apr 2022 18:30)  T(F): 98 (13 Apr 2022 23:30), Max: 98.6 (13 Apr 2022 18:30)  HR: 70 (13 Apr 2022 23:30) (60 - 78)  BP: 122/84 (13 Apr 2022 23:30) (91/54 - 138/85)  BP(mean): 97 (13 Apr 2022 21:00) (62 - 99)  RR: 18 (13 Apr 2022 23:30) (12 - 18)  SpO2: 97% (13 Apr 2022 23:30) (93% - 100%)    I&O's Detail    13 Apr 2022 07:01  -  13 Apr 2022 23:59  --------------------------------------------------------  IN:    Lactated Ringers: 120 mL    Oral Fluid: 240 mL  Total IN: 360 mL    OUT:    Chest Tube (mL): 5 mL    Voided (mL): 750 mL  Total OUT: 755 mL    Total NET: -395 mL      MEDICATIONS  (STANDING):  acetaminophen     Tablet .. 975 milliGRAM(s) Oral every 6 hours  amLODIPine   Tablet 2.5 milliGRAM(s) Oral daily  heparin   Injectable 5000 Unit(s) SubCutaneous every 8 hours  HYDROmorphone PCA (1 mG/mL) 30 milliLiter(s) PCA Continuous PCA Continuous  lactated ringers. 1000 milliLiter(s) (30 mL/Hr) IV Continuous <Continuous>  pantoprazole    Tablet 40 milliGRAM(s) Oral before breakfast  senna 2 Tablet(s) Oral at bedtime  valsartan 160 milliGRAM(s) Oral daily    A/P: S/P Flex Bronch, Right vats, Right lower lobe wedge resection   Chest tube placed to water seal   Follow up chest x-ray and labs in am   Chest PT, ambulation and incentive spirometer  Continue IV PCA for pain management   Sputum Culture for AFB x3

## 2022-04-13 NOTE — CHART NOTE - NSCHARTNOTEFT_GEN_A_CORE
POST-OPERATIVE NOTE    Subjective:  Patient is s/p diagnostic Laparoscopy, Omental biopsy, Intraperitoneal washings sent for cytology, Right subclavian mediport placement, wedge resection of RLL nodule. Recovering appropriately.     Vital Signs Last 24 Hrs  T(C): 36.7 (13 Apr 2022 21:00), Max: 37 (13 Apr 2022 18:30)  T(F): 98.1 (13 Apr 2022 21:00), Max: 98.6 (13 Apr 2022 18:30)  HR: 76 (13 Apr 2022 21:00) (60 - 78)  BP: 138/85 (13 Apr 2022 21:00) (91/54 - 138/85)  BP(mean): 97 (13 Apr 2022 21:00) (62 - 99)  RR: 18 (13 Apr 2022 21:00) (12 - 18)  SpO2: 95% (13 Apr 2022 21:00) (93% - 100%)  I&O's Detail    13 Apr 2022 07:01  -  13 Apr 2022 23:28  --------------------------------------------------------  IN:    Lactated Ringers: 120 mL    Oral Fluid: 240 mL  Total IN: 360 mL    OUT:    Chest Tube (mL): 5 mL    Voided (mL): 750 mL  Total OUT: 755 mL    Total NET: -395 mL        amLODIPine   Tablet 2.5  heparin   Injectable 5000  valsartan 160    PAST MEDICAL & SURGICAL HISTORY:  Hypertension    GERD (gastroesophageal reflux disease)    Gastritis, Helicobacter pylori    Adenocarcinoma of stomach    Lung nodule    H/O colonoscopy    S/P endoscopy          Physical Exam:  General: NAD, resting comfortably in bed  Chest: Chest tube in place.  Pulmonary: Nonlabored breathing, no respiratory distress  Cardiovascular: NSR  Abdominal: soft, NT/ND. Port site dressings c/d/i.  Extremities: WWP      CAPILLARY BLOOD GLUCOSE      POCT Blood Glucose.: 111 mg/dL (13 Apr 2022 08:51)        Assessment:  The patient is a 62y Male who is now several hours post-op from a diagnostic Laparoscopy, Omental biopsy, Intraperitoneal washings sent for cytology, Right subclavian mediport placement, wedge resection of RLL nodule. Recovering appropriately.     Plan:  - Appreciate excellent care per primary team  - Pain control as needed  - DVT ppx  - F/u AM labs

## 2022-04-13 NOTE — BRIEF OPERATIVE NOTE - NSICDXBRIEFPOSTOP_GEN_ALL_CORE_FT
POST-OP DIAGNOSIS:  Gastric adenocarcinoma 13-Apr-2022 12:30:08  Pa Pollard  
POST-OP DIAGNOSIS:  Gastric adenocarcinoma 13-Apr-2022 12:30:08  Pa Pollard  Lung granuloma 13-Apr-2022 12:30:32  Pa Pollard

## 2022-04-13 NOTE — BRIEF OPERATIVE NOTE - NSICDXBRIEFPREOP_GEN_ALL_CORE_FT
PRE-OP DIAGNOSIS:  Gastric adenocarcinoma 13-Apr-2022 12:30:03  Pa Pollard  
PRE-OP DIAGNOSIS:  Lung nodule 13-Apr-2022 12:29:53  Pa Pollard  Gastric adenocarcinoma 13-Apr-2022 12:30:03  Pa Pollard

## 2022-04-14 LAB
ANION GAP SERPL CALC-SCNC: 13 MMOL/L — SIGNIFICANT CHANGE UP (ref 7–14)
BASOPHILS # BLD AUTO: 0.01 K/UL — SIGNIFICANT CHANGE UP (ref 0–0.2)
BASOPHILS NFR BLD AUTO: 0.1 % — SIGNIFICANT CHANGE UP (ref 0–2)
BUN SERPL-MCNC: 18 MG/DL — SIGNIFICANT CHANGE UP (ref 7–23)
CALCIUM SERPL-MCNC: 9 MG/DL — SIGNIFICANT CHANGE UP (ref 8.4–10.5)
CHLORIDE SERPL-SCNC: 100 MMOL/L — SIGNIFICANT CHANGE UP (ref 98–107)
CO2 SERPL-SCNC: 22 MMOL/L — SIGNIFICANT CHANGE UP (ref 22–31)
CREAT SERPL-MCNC: 1.25 MG/DL — SIGNIFICANT CHANGE UP (ref 0.5–1.3)
EGFR: 65 ML/MIN/1.73M2 — SIGNIFICANT CHANGE UP
EOSINOPHIL # BLD AUTO: 0 K/UL — SIGNIFICANT CHANGE UP (ref 0–0.5)
EOSINOPHIL NFR BLD AUTO: 0 % — SIGNIFICANT CHANGE UP (ref 0–6)
GLUCOSE SERPL-MCNC: 130 MG/DL — HIGH (ref 70–99)
HCT VFR BLD CALC: 40.5 % — SIGNIFICANT CHANGE UP (ref 39–50)
HGB BLD-MCNC: 13.5 G/DL — SIGNIFICANT CHANGE UP (ref 13–17)
IANC: 7.86 K/UL — HIGH (ref 1.8–7.4)
IMM GRANULOCYTES NFR BLD AUTO: 0.3 % — SIGNIFICANT CHANGE UP (ref 0–1.5)
LYMPHOCYTES # BLD AUTO: 0.92 K/UL — LOW (ref 1–3.3)
LYMPHOCYTES # BLD AUTO: 9.9 % — LOW (ref 13–44)
MCHC RBC-ENTMCNC: 31 PG — SIGNIFICANT CHANGE UP (ref 27–34)
MCHC RBC-ENTMCNC: 33.3 GM/DL — SIGNIFICANT CHANGE UP (ref 32–36)
MCV RBC AUTO: 92.9 FL — SIGNIFICANT CHANGE UP (ref 80–100)
MONOCYTES # BLD AUTO: 0.47 K/UL — SIGNIFICANT CHANGE UP (ref 0–0.9)
MONOCYTES NFR BLD AUTO: 5.1 % — SIGNIFICANT CHANGE UP (ref 2–14)
NEUTROPHILS # BLD AUTO: 7.86 K/UL — HIGH (ref 1.8–7.4)
NEUTROPHILS NFR BLD AUTO: 84.6 % — HIGH (ref 43–77)
NIGHT BLUE STAIN TISS: SIGNIFICANT CHANGE UP
NIGHT BLUE STAIN TISS: SIGNIFICANT CHANGE UP
NRBC # BLD: 0 /100 WBCS — SIGNIFICANT CHANGE UP
NRBC # FLD: 0 K/UL — SIGNIFICANT CHANGE UP
PLATELET # BLD AUTO: 240 K/UL — SIGNIFICANT CHANGE UP (ref 150–400)
POTASSIUM SERPL-MCNC: 4.4 MMOL/L — SIGNIFICANT CHANGE UP (ref 3.5–5.3)
POTASSIUM SERPL-SCNC: 4.4 MMOL/L — SIGNIFICANT CHANGE UP (ref 3.5–5.3)
RBC # BLD: 4.36 M/UL — SIGNIFICANT CHANGE UP (ref 4.2–5.8)
RBC # FLD: 12.5 % — SIGNIFICANT CHANGE UP (ref 10.3–14.5)
SODIUM SERPL-SCNC: 135 MMOL/L — SIGNIFICANT CHANGE UP (ref 135–145)
SPECIMEN SOURCE: SIGNIFICANT CHANGE UP
SPECIMEN SOURCE: SIGNIFICANT CHANGE UP
WBC # BLD: 9.29 K/UL — SIGNIFICANT CHANGE UP (ref 3.8–10.5)
WBC # FLD AUTO: 9.29 K/UL — SIGNIFICANT CHANGE UP (ref 3.8–10.5)

## 2022-04-14 PROCEDURE — 71045 X-RAY EXAM CHEST 1 VIEW: CPT | Mod: 26

## 2022-04-14 PROCEDURE — 71045 X-RAY EXAM CHEST 1 VIEW: CPT | Mod: 26,77

## 2022-04-14 RX ORDER — OXYCODONE HYDROCHLORIDE 5 MG/1
5 TABLET ORAL
Refills: 0 | Status: DISCONTINUED | OUTPATIENT
Start: 2022-04-14 | End: 2022-04-15

## 2022-04-14 RX ADMIN — AMLODIPINE BESYLATE 2.5 MILLIGRAM(S): 2.5 TABLET ORAL at 06:32

## 2022-04-14 RX ADMIN — Medication 975 MILLIGRAM(S): at 09:19

## 2022-04-14 RX ADMIN — Medication 975 MILLIGRAM(S): at 13:50

## 2022-04-14 RX ADMIN — HEPARIN SODIUM 5000 UNIT(S): 5000 INJECTION INTRAVENOUS; SUBCUTANEOUS at 06:00

## 2022-04-14 RX ADMIN — Medication 975 MILLIGRAM(S): at 18:29

## 2022-04-14 RX ADMIN — HYDROMORPHONE HYDROCHLORIDE 30 MILLILITER(S): 2 INJECTION INTRAMUSCULAR; INTRAVENOUS; SUBCUTANEOUS at 01:56

## 2022-04-14 RX ADMIN — HEPARIN SODIUM 5000 UNIT(S): 5000 INJECTION INTRAVENOUS; SUBCUTANEOUS at 13:18

## 2022-04-14 RX ADMIN — SODIUM CHLORIDE 30 MILLILITER(S): 9 INJECTION, SOLUTION INTRAVENOUS at 09:19

## 2022-04-14 RX ADMIN — Medication 975 MILLIGRAM(S): at 18:59

## 2022-04-14 RX ADMIN — PANTOPRAZOLE SODIUM 40 MILLIGRAM(S): 20 TABLET, DELAYED RELEASE ORAL at 06:32

## 2022-04-14 RX ADMIN — VALSARTAN 160 MILLIGRAM(S): 80 TABLET ORAL at 06:32

## 2022-04-14 RX ADMIN — Medication 975 MILLIGRAM(S): at 09:50

## 2022-04-14 RX ADMIN — SENNA PLUS 2 TABLET(S): 8.6 TABLET ORAL at 22:43

## 2022-04-14 RX ADMIN — HEPARIN SODIUM 5000 UNIT(S): 5000 INJECTION INTRAVENOUS; SUBCUTANEOUS at 22:43

## 2022-04-14 RX ADMIN — Medication 975 MILLIGRAM(S): at 13:18

## 2022-04-14 RX ADMIN — Medication 975 MILLIGRAM(S): at 01:57

## 2022-04-14 RX ADMIN — HYDROMORPHONE HYDROCHLORIDE 30 MILLILITER(S): 2 INJECTION INTRAMUSCULAR; INTRAVENOUS; SUBCUTANEOUS at 07:44

## 2022-04-14 NOTE — PROGRESS NOTE ADULT - SUBJECTIVE AND OBJECTIVE BOX
Subjective: no acute complaints   pt dnies chest pan and shortness of breath  he is ambulatory in the room  on respiratory precautions for r/o TB  #2 out of 3 sputum samples sent  pt tolerating diet and +flatus    Vital Signs:  Vital Signs Last 24 Hrs  T(C): 36.6 (04-14-22 @ 12:11), Max: 37 (04-13-22 @ 18:30)  T(F): 97.9 (04-14-22 @ 12:11), Max: 98.6 (04-13-22 @ 18:30)  HR: 81 (04-14-22 @ 12:11) (64 - 81)  BP: 108/71 (04-14-22 @ 12:11) (108/71 - 138/85)  RR: 18 (04-14-22 @ 12:11) (13 - 18)  SpO2: 95% (04-14-22 @ 12:11) (93% - 97%) on (O2)    Telemetry/Alarms:  General: WN/WD NAD  Neurology: Awake, nonfocal, URBAN x 4  Eyes: Scleras clear, PERRLA/ EOMI, Gross vision intact  ENT:Gross hearing intact, grossly patent pharynx, no stridor  Neck: Neck supple, trachea midline, No JVD,   Respiratory: CTA B/L, No wheezing, rales, rhonchi  CV: RRR, S1S2, no murmurs, rubs or gallops  Abdominal: Soft, NT, ND +BS,   Extremities: No edema, + peripheral pulses  Skin: No Rashes, Hematoma, Ecchymosis  Lymphatic: No Neck, axilla, groin LAD  Psych: Oriented x 3, normal affect  Incisions: c,d,i   Tubes: chest tube removed   Relevant labs, radiology and Medications reviewed                        13.5 9.29  )-----------( 240      ( 14 Apr 2022 05:56 )             40.5     04-14    135  |  100  |  18  ----------------------------<  130<H>  4.4   |  22  |  1.25    Ca    9.0      14 Apr 2022 05:56        MEDICATIONS  (STANDING):  acetaminophen     Tablet .. 975 milliGRAM(s) Oral every 6 hours  amLODIPine   Tablet 2.5 milliGRAM(s) Oral daily  heparin   Injectable 5000 Unit(s) SubCutaneous every 8 hours  lactated ringers. 1000 milliLiter(s) (30 mL/Hr) IV Continuous <Continuous>  pantoprazole    Tablet 40 milliGRAM(s) Oral before breakfast  senna 2 Tablet(s) Oral at bedtime  valsartan 160 milliGRAM(s) Oral daily    MEDICATIONS  (PRN):  naloxone Injectable 0.1 milliGRAM(s) IV Push every 3 minutes PRN For ANY of the following changes in patient status:  A. RR LESS THAN 10 breaths per minute, B. Oxygen saturation LESS THAN 90%, C. Sedation score of 6  ondansetron Injectable 4 milliGRAM(s) IV Push every 6 hours PRN Nausea  oxyCODONE    IR 5 milliGRAM(s) Oral every 3 hours PRN Moderate- Severe Pain (4 - 10)    Pertinent Physical Exam  I&O's Summary    13 Apr 2022 07:01  -  14 Apr 2022 07:00  --------------------------------------------------------  IN: 390 mL / OUT: 1918 mL / NET: -1528 mL    14 Apr 2022 07:01  -  14 Apr 2022 17:48  --------------------------------------------------------  IN: 300 mL / OUT: 0 mL / NET: 300 mL        Assessment  62y Male  w/ PAST MEDICAL & SURGICAL HISTORY:  Hypertension    GERD (gastroesophageal reflux disease)    Gastritis, Helicobacter pylori    Adenocarcinoma of stomach    Lung nodule    H/O colonoscopy    S/P endoscopy    Patient s/p flex bronch, right vats, right lower lobe wedge resection 4/12/2022.  S/p diagnostic Laparoscopy, Omental biopsy, Intraperitoneal washings sent for cytology, Right subclavian mediport placement with general surgery.  Patient resting, IV PCA in place.  Chest tube to suction, no air leak noted.  Incision with dressing clean and dry. Patient tolerating po, voiding.      . Postoperative course/issues: chest tube removed today    PLAN  Neuro: Pain management  Pulm: Encourage coughing, deep breathing and use of incentive spirometry. Wean off supplemental oxygen as able. Daily CXR.   Cardio: Monitor telemetry/alarms  GI: Tolerating diet. Continue stool softeners.  Renal: monitor urine output, supplement electrolytes as needed  Vasc: Heparin SC/SCDs for DVT prophylaxis  Heme: Stable H/H. .   ID: f/u sputum for AFB  Therapy: OOB/ambulate  Disposition: Aim to D/C to home once off isolation   Discussed with Cardiothoracic Team at AM rounds.  
Anesthesia Pain Management Service    SUBJECTIVE: Patient reports his pain is well-controlled and that his chest tube was recently taken out.    Pain Scale Score	At rest: _3/10__ 	With Activity: ___ 	[X ] Refer to charted pain scores    THERAPY:    [ ] IV PCA Morphine		[ ] 5 mg/mL	[ ] 1 mg/mL  [X ] IV PCA Hydromorphone	[ ] 5 mg/mL	[X ] 1 mg/mL  [ ] IV PCA Fentanyl		[ ] 50 micrograms/mL    Demand dose __0.2_ lockout __6_ (minutes) Continuous Rate _0__ Total: _3.7__   mg used (in past 24 hrs)      MEDICATIONS  (STANDING):  acetaminophen     Tablet .. 975 milliGRAM(s) Oral every 6 hours  amLODIPine   Tablet 2.5 milliGRAM(s) Oral daily  heparin   Injectable 5000 Unit(s) SubCutaneous every 8 hours  lactated ringers. 1000 milliLiter(s) (30 mL/Hr) IV Continuous <Continuous>  pantoprazole    Tablet 40 milliGRAM(s) Oral before breakfast  senna 2 Tablet(s) Oral at bedtime  valsartan 160 milliGRAM(s) Oral daily    MEDICATIONS  (PRN):  naloxone Injectable 0.1 milliGRAM(s) IV Push every 3 minutes PRN For ANY of the following changes in patient status:  A. RR LESS THAN 10 breaths per minute, B. Oxygen saturation LESS THAN 90%, C. Sedation score of 6  ondansetron Injectable 4 milliGRAM(s) IV Push every 6 hours PRN Nausea  oxyCODONE    IR 5 milliGRAM(s) Oral every 3 hours PRN Moderate- Severe Pain (4 - 10)      OBJECTIVE: Patient sitting up in bed with family members at bedside.    Sedation Score:	[ X] Alert	[ ] Drowsy 	[ ] Arousable	[ ] Asleep	[ ] Unresponsive    Side Effects:	[X ] None	[ ] Nausea	[ ] Vomiting	[ ] Pruritus  		[ ] Other:    Vital Signs Last 24 Hrs  T(C): 36.6 (14 Apr 2022 12:11), Max: 37 (13 Apr 2022 18:30)  T(F): 97.9 (14 Apr 2022 12:11), Max: 98.6 (13 Apr 2022 18:30)  HR: 81 (14 Apr 2022 12:11) (60 - 81)  BP: 108/71 (14 Apr 2022 12:11) (91/54 - 138/85)  BP(mean): 97 (13 Apr 2022 21:00) (62 - 99)  RR: 18 (14 Apr 2022 12:11) (12 - 18)  SpO2: 95% (14 Apr 2022 12:11) (93% - 100%)    ASSESSMENT/ PLAN    Therapy to  be:	[ ] Continue   [ X] Discontinued   [X ] Change to prn Analgesics    Documentation and Verification of current medications:   [X] Done	[ ] Not done, not elligible    Comments: Discussed patient with primary team, IV PCA discontinued. PRN Oral/IV opioids and/or Adjuvant non-opioid medication to be ordered at this point.    Progress Note written now but Patient was seen earlier.
Surgical Oncology Progress Note    SUBJECTIVE  No acute events overnight. Pt seen and examined on mornings rounds.    OBJECTIVE  ___________________________________________________  VITAL SIGNS / I&O's   Vital Signs Last 24 Hrs  T(C): 36.7 (13 Apr 2022 23:30), Max: 37 (13 Apr 2022 18:30)  T(F): 98 (13 Apr 2022 23:30), Max: 98.6 (13 Apr 2022 18:30)  HR: 70 (13 Apr 2022 23:30) (60 - 78)  BP: 122/84 (13 Apr 2022 23:30) (91/54 - 138/85)  BP(mean): 97 (13 Apr 2022 21:00) (62 - 99)  RR: 18 (13 Apr 2022 23:30) (12 - 18)  SpO2: 97% (13 Apr 2022 23:30) (93% - 100%)      13 Apr 2022 07:01  -  14 Apr 2022 00:53  --------------------------------------------------------  IN:    Lactated Ringers: 120 mL    Oral Fluid: 240 mL  Total IN: 360 mL    OUT:    Chest Tube (mL): 5 mL    Voided (mL): 750 mL  Total OUT: 755 mL    Total NET: -395 mL        ___________________________________________________  PHYSICAL EXAM    General: NAD, resting comfortably in bed  Chest: Chest tube in place.  Pulmonary: Nonlabored breathing, no respiratory distress  Cardiovascular: NSR  Abdominal: soft, NT/ND. Port site dressings c/d/i.  Extremities: WWP    ___________________________________________________  LABS      CAPILLARY BLOOD GLUCOSE      POCT Blood Glucose.: 111 mg/dL (13 Apr 2022 08:51)      ___________________________________________________  MICRO  Recent Cultures:  Specimen Source: .Tissue RIGHT LOWER LOBE WEDGE, 04-13 @ 16:44; Results --; Gram Stain:   No polymorphonuclear cells seen  No organisms seen; Organism: --    ___________________________________________________  MEDICATIONS  (STANDING):  acetaminophen     Tablet .. 975 milliGRAM(s) Oral every 6 hours  amLODIPine   Tablet 2.5 milliGRAM(s) Oral daily  heparin   Injectable 5000 Unit(s) SubCutaneous every 8 hours  HYDROmorphone PCA (1 mG/mL) 30 milliLiter(s) PCA Continuous PCA Continuous  lactated ringers. 1000 milliLiter(s) (30 mL/Hr) IV Continuous <Continuous>  pantoprazole    Tablet 40 milliGRAM(s) Oral before breakfast  senna 2 Tablet(s) Oral at bedtime  valsartan 160 milliGRAM(s) Oral daily    MEDICATIONS  (PRN):  HYDROmorphone PCA (1 mG/mL) Rescue Clinician Bolus 0.5 milliGRAM(s) IV Push every 15 minutes PRN for Pain Scale GREATER THAN 6  naloxone Injectable 0.1 milliGRAM(s) IV Push every 3 minutes PRN For ANY of the following changes in patient status:  A. RR LESS THAN 10 breaths per minute, B. Oxygen saturation LESS THAN 90%, C. Sedation score of 6  ondansetron Injectable 4 milliGRAM(s) IV Push every 6 hours PRN Nausea  
ANESTHESIA POSTOP CHECK    62y Male POSTOP DAY 1 S/P     [ X] NO APPARENT ANESTHESIA COMPLICATIONS      Comments: 
Anesthesia Pain Management Service- Attending Addendum    SUBJECTIVE: Patient's pain control adequate    Therapy:	  [ X] IV PCA	   [ ] Epidural           [ ] s/p Spinal Opoid              [ ] Postpartum infusion	  [ ] Patient controlled regional anesthesia (PCRA)    [ ] prn Analgesics    Allergies    penicillins (Unknown)    Intolerances      MEDICATIONS  (STANDING):  acetaminophen     Tablet .. 975 milliGRAM(s) Oral every 6 hours  amLODIPine   Tablet 2.5 milliGRAM(s) Oral daily  heparin   Injectable 5000 Unit(s) SubCutaneous every 8 hours  lactated ringers. 1000 milliLiter(s) (30 mL/Hr) IV Continuous <Continuous>  pantoprazole    Tablet 40 milliGRAM(s) Oral before breakfast  senna 2 Tablet(s) Oral at bedtime  valsartan 160 milliGRAM(s) Oral daily    MEDICATIONS  (PRN):  naloxone Injectable 0.1 milliGRAM(s) IV Push every 3 minutes PRN For ANY of the following changes in patient status:  A. RR LESS THAN 10 breaths per minute, B. Oxygen saturation LESS THAN 90%, C. Sedation score of 6  ondansetron Injectable 4 milliGRAM(s) IV Push every 6 hours PRN Nausea  oxyCODONE    IR 5 milliGRAM(s) Oral every 3 hours PRN Moderate- Severe Pain (4 - 10)      OBJECTIVE:   [X] No new signs     [ ] Other:    Side Effects:  [X ] None			[ ] Other:      ASSESSMENT/PLAN  -Discontinue current therapy    [ ] Therapy changed to:    [ ] IV PCA       [ ] Epidural     [ X] prn Analgesics     Comments: Pain management per primary team, APS to sign off

## 2022-04-14 NOTE — PROGRESS NOTE ADULT - ASSESSMENT
Assessment:  The patient is a 62y Male who is now several hours post-op from a diagnostic Laparoscopy, Omental biopsy, Intraperitoneal washings sent for cytology, Right subclavian mediport placement, wedge resection of RLL nodule. Recovering appropriately.     Plan:  - Appreciate excellent care per primary team  - Pain control as needed  - DVT ppx  - F/u AM labs.    Surgical Oncology  [id number] Assessment:  The patient is a 62y Male who is now several hours post-op from a diagnostic Laparoscopy, Omental biopsy, Intraperitoneal washings sent for cytology, Right subclavian mediport placement, wedge resection of RLL nodule. Recovering appropriately.     Plan:  - Appreciate excellent care per primary team  - Pain control as needed  - DVT ppx  - F/u AM labs.  - please call back with questions    Surgical Oncology  [id number]

## 2022-04-14 NOTE — PATIENT PROFILE ADULT - NSTOBACCOCESSATIONEDU4_GEN_A_NUR
Smoking even a single puff increases the likelihood of a full relapse, withdrawal symptoms peak within 1-2 weeks, but can persist for months vital signs/nurses notes/old records

## 2022-04-14 NOTE — PATIENT PROFILE ADULT - FALL HARM RISK - UNIVERSAL INTERVENTIONS
Bed in lowest position, wheels locked, appropriate side rails in place/Call bell, personal items and telephone in reach/Instruct patient to call for assistance before getting out of bed or chair/Non-slip footwear when patient is out of bed/[place] to call system/Physically safe environment - no spills, clutter or unnecessary equipment/Purposeful Proactive Rounding/Room/bathroom lighting operational, light cord in reach

## 2022-04-15 ENCOUNTER — TRANSCRIPTION ENCOUNTER (OUTPATIENT)
Age: 62
End: 2022-04-15

## 2022-04-15 VITALS
RESPIRATION RATE: 18 BRPM | OXYGEN SATURATION: 96 % | SYSTOLIC BLOOD PRESSURE: 116 MMHG | HEART RATE: 78 BPM | DIASTOLIC BLOOD PRESSURE: 84 MMHG

## 2022-04-15 LAB
NIGHT BLUE STAIN TISS: SIGNIFICANT CHANGE UP
NON-GYNECOLOGICAL CYTOLOGY STUDY: SIGNIFICANT CHANGE UP
SPECIMEN SOURCE: SIGNIFICANT CHANGE UP

## 2022-04-15 PROCEDURE — 71045 X-RAY EXAM CHEST 1 VIEW: CPT | Mod: 26

## 2022-04-15 RX ORDER — SENNA PLUS 8.6 MG/1
2 TABLET ORAL
Qty: 0 | Refills: 0 | DISCHARGE
Start: 2022-04-15

## 2022-04-15 RX ORDER — OXYCODONE HYDROCHLORIDE 5 MG/1
1 TABLET ORAL
Qty: 20 | Refills: 0
Start: 2022-04-15 | End: 2022-04-19

## 2022-04-15 RX ADMIN — Medication 975 MILLIGRAM(S): at 05:49

## 2022-04-15 RX ADMIN — HEPARIN SODIUM 5000 UNIT(S): 5000 INJECTION INTRAVENOUS; SUBCUTANEOUS at 12:56

## 2022-04-15 RX ADMIN — Medication 975 MILLIGRAM(S): at 12:56

## 2022-04-15 RX ADMIN — PANTOPRAZOLE SODIUM 40 MILLIGRAM(S): 20 TABLET, DELAYED RELEASE ORAL at 05:50

## 2022-04-15 RX ADMIN — Medication 975 MILLIGRAM(S): at 05:57

## 2022-04-15 RX ADMIN — VALSARTAN 160 MILLIGRAM(S): 80 TABLET ORAL at 05:50

## 2022-04-15 RX ADMIN — HEPARIN SODIUM 5000 UNIT(S): 5000 INJECTION INTRAVENOUS; SUBCUTANEOUS at 05:50

## 2022-04-15 RX ADMIN — Medication 975 MILLIGRAM(S): at 13:33

## 2022-04-15 RX ADMIN — Medication 975 MILLIGRAM(S): at 00:40

## 2022-04-15 RX ADMIN — Medication 975 MILLIGRAM(S): at 00:24

## 2022-04-15 RX ADMIN — AMLODIPINE BESYLATE 2.5 MILLIGRAM(S): 2.5 TABLET ORAL at 05:50

## 2022-04-15 NOTE — DISCHARGE NOTE PROVIDER - NSDCFUADDAPPT_GEN_ALL_CORE_FT
Please call Dr Cardenas to schedule a follow up appointment. Have a chest xray done 1-2 days prior to your appointment and bring it with you to the office on the day of your visit

## 2022-04-15 NOTE — DISCHARGE NOTE PROVIDER - NSDCFUADDINST_GEN_ALL_CORE_FT
Keep dressing dry and intact until tomorrow, then remove dressing and shower daily. Call Dr Cardenas's office if you develop fever, shortness of breath, chest pain or wound redness/foul drainage

## 2022-04-15 NOTE — DISCHARGE NOTE PROVIDER - NSDCCPTREATMENT_GEN_ALL_CORE_FT
PRINCIPAL PROCEDURE  Procedure: VATS, with flexible bronchoscopy  Findings and Treatment: RVATS, RLL Wedge

## 2022-04-15 NOTE — DISCHARGE NOTE NURSING/CASE MANAGEMENT/SOCIAL WORK - NSDCPEFALRISK_GEN_ALL_CORE
For information on Fall & Injury Prevention, visit: [email or web address] OR  [email or web address] OR  https://www.cdc.gov/steadi/patient.html

## 2022-04-15 NOTE — DISCHARGE NOTE PROVIDER - HOSPITAL COURSE
HPI:  62 year old male with PMH of HTN, GERD, Anemia, presents to [facility], with pre op diagnosis of malignant neoplasm of stomach unspecified, for pre op evaluation prior to scheduled surgery- Diagnostic laparoscopy with biopsy, Mediport insertion with Dr Ramirez. and Dr Cardenas to ADD codes. (07 Apr 2022 14:12)  On 04/12/2022 pt underwent RVATS, RLL Wwedge resection. Frozen c/w granulomatous inflammation. Pt was discharge after Three sputums for AFB were negative.

## 2022-04-15 NOTE — DISCHARGE NOTE PROVIDER - NSDCFUSCHEDAPPT_GEN_ALL_CORE_FT
MARIANELA COLBY ; 04/21/2022 ; [facility]  MARIANELA COLBY ; 04/23/2022 ; [facility]  MARIANELA COLBY ; 05/05/2022 ; [facility]  MARIANELA COLBY ; 05/07/2022 ; [facility]  MARIANELA COLBY ; 05/19/2022 ; [facility]  MARIANELA COLBY ; 05/21/2022 ; [facility]  MARIANELA COLBY ; 06/03/2022 ; [facility]  MARIANELA COLBY ; 06/05/2022 ; [facility]

## 2022-04-15 NOTE — DISCHARGE NOTE NURSING/CASE MANAGEMENT/SOCIAL WORK - NSDCPEEMAIL_GEN_ALL_CORE
[facility] for Tobacco Control email [email or web address]

## 2022-04-15 NOTE — DISCHARGE NOTE PROVIDER - NSDCMRMEDTOKEN_GEN_ALL_CORE_FT
amLODIPine 2.5 mg oral tablet: 1 tab(s) orally once a day  Multi Vitamin+: 1 tab(s) orally once a day/ LD on 04/07/22  omeprazole 40 mg oral delayed release capsule: 1 cap(s) orally once a day  oxyCODONE 5 mg oral tablet: 1 tab(s) orally every 6 hours as needed for moderate painMDD:6  senna oral tablet: 2 tab(s) orally once a day (at bedtime), As Needed for constipsation  Tylenol 325 mg oral tablet: 2 tab(s) orally every 4 hours as needed for mild pain  valsartan 160 mg oral tablet: 1 tab(s) orally once a day

## 2022-04-15 NOTE — DISCHARGE NOTE NURSING/CASE MANAGEMENT/SOCIAL WORK - PATIENT PORTAL LINK FT
You can access the FollowMyHealth Patient Portal offered by [facility] by registering at the following website: [email or web address]. By joining alike’s FollowMyHealth portal, you will also be able to view your health information using other applications (apps) compatible with our system.

## 2022-04-15 NOTE — DISCHARGE NOTE NURSING/CASE MANAGEMENT/SOCIAL WORK - NSDCPEWEB_GEN_ALL_CORE
[facility] for Tobacco Control website --- [email or web address] website --- [email or web address]

## 2022-04-15 NOTE — DISCHARGE NOTE NURSING/CASE MANAGEMENT/SOCIAL WORK - NSDCPNINST_GEN_ALL_CORE
Take medication as ordered, follow up with MD as advised, monitor incision for any sign of infection, call MD if any fever over 100.4F, drainage at site, redness, swelling.   Gradually increase activity, eat a heart healthy diet

## 2022-04-15 NOTE — DISCHARGE NOTE PROVIDER - CARE PROVIDER_API CALL
Max Cardenas (MD)  Surgery; Thoracic Surgery  [phone] [place] [address]  Phone: [phone]  Fax: [phone]  Follow Up Time:

## 2022-04-17 ENCOUNTER — OUTPATIENT (OUTPATIENT)
Dept: OUTPATIENT SERVICES | Facility: HOSPITAL | Age: 62
LOS: 1 days | Discharge: HOME | End: 2022-04-17

## 2022-04-17 DIAGNOSIS — C16.9 MALIGNANT NEOPLASM OF STOMACH, UNSPECIFIED: ICD-10-CM

## 2022-04-17 DIAGNOSIS — Z98.890 OTHER SPECIFIED POSTPROCEDURAL STATES: Chronic | ICD-10-CM

## 2022-04-18 ENCOUNTER — OUTPATIENT (OUTPATIENT)
Dept: OUTPATIENT SERVICES | Facility: HOSPITAL | Age: 62
LOS: 1 days | Discharge: HOME | End: 2022-04-18

## 2022-04-18 DIAGNOSIS — Z98.890 OTHER SPECIFIED POSTPROCEDURAL STATES: Chronic | ICD-10-CM

## 2022-04-18 DIAGNOSIS — C16.9 MALIGNANT NEOPLASM OF STOMACH, UNSPECIFIED: ICD-10-CM

## 2022-04-18 LAB
CULTURE RESULTS: SIGNIFICANT CHANGE UP
SPECIMEN SOURCE: SIGNIFICANT CHANGE UP

## 2022-04-21 ENCOUNTER — APPOINTMENT (OUTPATIENT)
Dept: RADIOLOGY | Facility: HOSPITAL | Age: 62
End: 2022-04-21

## 2022-04-21 ENCOUNTER — OUTPATIENT (OUTPATIENT)
Dept: OUTPATIENT SERVICES | Facility: HOSPITAL | Age: 62
LOS: 1 days | End: 2022-04-21
Payer: COMMERCIAL

## 2022-04-21 ENCOUNTER — RESULT REVIEW (OUTPATIENT)
Age: 62
End: 2022-04-21

## 2022-04-21 ENCOUNTER — APPOINTMENT (OUTPATIENT)
Dept: THORACIC SURGERY | Facility: CLINIC | Age: 62
End: 2022-04-21
Payer: COMMERCIAL

## 2022-04-21 VITALS
HEIGHT: 70 IN | BODY MASS INDEX: 25.34 KG/M2 | WEIGHT: 177 LBS | SYSTOLIC BLOOD PRESSURE: 119 MMHG | HEART RATE: 80 BPM | OXYGEN SATURATION: 94 % | DIASTOLIC BLOOD PRESSURE: 82 MMHG

## 2022-04-21 DIAGNOSIS — R91.1 SOLITARY PULMONARY NODULE: ICD-10-CM

## 2022-04-21 DIAGNOSIS — R91.8 OTHER NONSPECIFIC ABNORMAL FINDING OF LUNG FIELD: ICD-10-CM

## 2022-04-21 DIAGNOSIS — Z98.890 OTHER SPECIFIED POSTPROCEDURAL STATES: Chronic | ICD-10-CM

## 2022-04-21 PROCEDURE — 71046 X-RAY EXAM CHEST 2 VIEWS: CPT | Mod: 26

## 2022-04-21 PROCEDURE — 99024 POSTOP FOLLOW-UP VISIT: CPT

## 2022-04-22 LAB — SURGICAL PATHOLOGY STUDY: SIGNIFICANT CHANGE UP

## 2022-04-27 ENCOUNTER — RESULT REVIEW (OUTPATIENT)
Age: 62
End: 2022-04-27

## 2022-04-27 ENCOUNTER — APPOINTMENT (OUTPATIENT)
Dept: HEMATOLOGY ONCOLOGY | Facility: CLINIC | Age: 62
End: 2022-04-27
Payer: COMMERCIAL

## 2022-04-27 VITALS
SYSTOLIC BLOOD PRESSURE: 134 MMHG | BODY MASS INDEX: 25.75 KG/M2 | TEMPERATURE: 98.2 F | WEIGHT: 179.46 LBS | HEART RATE: 73 BPM | DIASTOLIC BLOOD PRESSURE: 80 MMHG | RESPIRATION RATE: 16 BRPM | OXYGEN SATURATION: 99 %

## 2022-04-27 LAB
ALBUMIN SERPL ELPH-MCNC: 4.7 G/DL
ALP BLD-CCNC: 75 U/L
ALT SERPL-CCNC: 40 U/L
ANION GAP SERPL CALC-SCNC: 14 MMOL/L
AST SERPL-CCNC: 24 U/L
BASOPHILS # BLD AUTO: 0.1 K/UL — SIGNIFICANT CHANGE UP (ref 0–0.2)
BASOPHILS NFR BLD AUTO: 1 % — SIGNIFICANT CHANGE UP (ref 0–2)
BILIRUB SERPL-MCNC: 0.2 MG/DL
BUN SERPL-MCNC: 25 MG/DL
CALCIUM SERPL-MCNC: 9.9 MG/DL
CEA SERPL-MCNC: 2.2 NG/ML
CHLORIDE SERPL-SCNC: 104 MMOL/L
CO2 SERPL-SCNC: 22 MMOL/L
CREAT SERPL-MCNC: 1.18 MG/DL
EGFR: 70 ML/MIN/1.73M2
EOSINOPHIL # BLD AUTO: 0.05 K/UL — SIGNIFICANT CHANGE UP (ref 0–0.5)
EOSINOPHIL NFR BLD AUTO: 0.5 % — SIGNIFICANT CHANGE UP (ref 0–6)
GLUCOSE SERPL-MCNC: 127 MG/DL
HCT VFR BLD CALC: 42.3 % — SIGNIFICANT CHANGE UP (ref 39–50)
HGB BLD-MCNC: 13.9 G/DL — SIGNIFICANT CHANGE UP (ref 13–17)
IMM GRANULOCYTES NFR BLD AUTO: 0.4 % — SIGNIFICANT CHANGE UP (ref 0–1.5)
LYMPHOCYTES # BLD AUTO: 0.79 K/UL — LOW (ref 1–3.3)
LYMPHOCYTES # BLD AUTO: 8.1 % — LOW (ref 13–44)
MCHC RBC-ENTMCNC: 31.2 PG — SIGNIFICANT CHANGE UP (ref 27–34)
MCHC RBC-ENTMCNC: 32.9 G/DL — SIGNIFICANT CHANGE UP (ref 32–36)
MCV RBC AUTO: 94.8 FL — SIGNIFICANT CHANGE UP (ref 80–100)
MONOCYTES # BLD AUTO: 0.1 K/UL — SIGNIFICANT CHANGE UP (ref 0–0.9)
MONOCYTES NFR BLD AUTO: 1 % — LOW (ref 2–14)
NEUTROPHILS # BLD AUTO: 8.64 K/UL — HIGH (ref 1.8–7.4)
NEUTROPHILS NFR BLD AUTO: 89 % — HIGH (ref 43–77)
NRBC # BLD: 0 /100 WBCS — SIGNIFICANT CHANGE UP (ref 0–0)
PLATELET # BLD AUTO: 321 K/UL — SIGNIFICANT CHANGE UP (ref 150–400)
POTASSIUM SERPL-SCNC: 5.4 MMOL/L
PROT SERPL-MCNC: 7.4 G/DL
RBC # BLD: 4.46 M/UL — SIGNIFICANT CHANGE UP (ref 4.2–5.8)
RBC # FLD: 12.4 % — SIGNIFICANT CHANGE UP (ref 10.3–14.5)
SODIUM SERPL-SCNC: 140 MMOL/L
WBC # BLD: 9.72 K/UL — SIGNIFICANT CHANGE UP (ref 3.8–10.5)
WBC # FLD AUTO: 9.72 K/UL — SIGNIFICANT CHANGE UP (ref 3.8–10.5)

## 2022-04-27 PROCEDURE — 93010 ELECTROCARDIOGRAM REPORT: CPT

## 2022-04-27 PROCEDURE — 99215 OFFICE O/P EST HI 40 MIN: CPT

## 2022-04-28 ENCOUNTER — APPOINTMENT (OUTPATIENT)
Dept: HEMATOLOGY ONCOLOGY | Facility: CLINIC | Age: 62
End: 2022-04-28

## 2022-04-28 ENCOUNTER — NON-APPOINTMENT (OUTPATIENT)
Age: 62
End: 2022-04-28

## 2022-04-28 ENCOUNTER — RESULT REVIEW (OUTPATIENT)
Age: 62
End: 2022-04-28

## 2022-04-28 ENCOUNTER — APPOINTMENT (OUTPATIENT)
Dept: INFUSION THERAPY | Facility: HOSPITAL | Age: 62
End: 2022-04-28

## 2022-04-28 DIAGNOSIS — R11.2 NAUSEA WITH VOMITING, UNSPECIFIED: ICD-10-CM

## 2022-04-28 DIAGNOSIS — Z51.11 ENCOUNTER FOR ANTINEOPLASTIC CHEMOTHERAPY: ICD-10-CM

## 2022-04-28 LAB
ANION GAP SERPL CALC-SCNC: 16 MMOL/L — SIGNIFICANT CHANGE UP (ref 5–17)
BASOPHILS # BLD AUTO: 0.02 K/UL — SIGNIFICANT CHANGE UP (ref 0–0.2)
BASOPHILS NFR BLD AUTO: 0.2 % — SIGNIFICANT CHANGE UP (ref 0–2)
BUN SERPL-MCNC: 29 MG/DL — HIGH (ref 7–23)
CALCIUM SERPL-MCNC: 9.6 MG/DL — SIGNIFICANT CHANGE UP (ref 8.4–10.5)
CHLORIDE SERPL-SCNC: 101 MMOL/L — SIGNIFICANT CHANGE UP (ref 96–108)
CO2 SERPL-SCNC: 21 MMOL/L — LOW (ref 22–31)
CREAT SERPL-MCNC: 1.05 MG/DL — SIGNIFICANT CHANGE UP (ref 0.5–1.3)
EGFR: 80 ML/MIN/1.73M2 — SIGNIFICANT CHANGE UP
EOSINOPHIL # BLD AUTO: 0 K/UL — SIGNIFICANT CHANGE UP (ref 0–0.5)
EOSINOPHIL NFR BLD AUTO: 0 % — SIGNIFICANT CHANGE UP (ref 0–6)
GLUCOSE SERPL-MCNC: 130 MG/DL — HIGH (ref 70–99)
HBV CORE IGG+IGM SER QL: REACTIVE
HBV SURFACE AB SER QL: REACTIVE
HBV SURFACE AG SER QL: NONREACTIVE
HCT VFR BLD CALC: 40 % — SIGNIFICANT CHANGE UP (ref 39–50)
HCV AB SER QL: NONREACTIVE
HCV S/CO RATIO: 0.19 S/CO
HGB BLD-MCNC: 13.6 G/DL — SIGNIFICANT CHANGE UP (ref 13–17)
IMM GRANULOCYTES NFR BLD AUTO: 1 % — SIGNIFICANT CHANGE UP (ref 0–1.5)
LYMPHOCYTES # BLD AUTO: 1.28 K/UL — SIGNIFICANT CHANGE UP (ref 1–3.3)
LYMPHOCYTES # BLD AUTO: 11.1 % — LOW (ref 13–44)
MCHC RBC-ENTMCNC: 31.1 PG — SIGNIFICANT CHANGE UP (ref 27–34)
MCHC RBC-ENTMCNC: 34 G/DL — SIGNIFICANT CHANGE UP (ref 32–36)
MCV RBC AUTO: 91.5 FL — SIGNIFICANT CHANGE UP (ref 80–100)
MONOCYTES # BLD AUTO: 0.08 K/UL — SIGNIFICANT CHANGE UP (ref 0–0.9)
MONOCYTES NFR BLD AUTO: 0.7 % — LOW (ref 2–14)
NEUTROPHILS # BLD AUTO: 10.04 K/UL — HIGH (ref 1.8–7.4)
NEUTROPHILS NFR BLD AUTO: 87 % — HIGH (ref 43–77)
NRBC # BLD: 0 /100 WBCS — SIGNIFICANT CHANGE UP (ref 0–0)
PLATELET # BLD AUTO: 327 K/UL — SIGNIFICANT CHANGE UP (ref 150–400)
POTASSIUM SERPL-MCNC: 4.7 MMOL/L — SIGNIFICANT CHANGE UP (ref 3.5–5.3)
POTASSIUM SERPL-SCNC: 4.7 MMOL/L — SIGNIFICANT CHANGE UP (ref 3.5–5.3)
RBC # BLD: 4.37 M/UL — SIGNIFICANT CHANGE UP (ref 4.2–5.8)
RBC # FLD: 12.2 % — SIGNIFICANT CHANGE UP (ref 10.3–14.5)
SODIUM SERPL-SCNC: 138 MMOL/L — SIGNIFICANT CHANGE UP (ref 135–145)
WBC # BLD: 11.53 K/UL — HIGH (ref 3.8–10.5)
WBC # FLD AUTO: 11.53 K/UL — HIGH (ref 3.8–10.5)

## 2022-04-28 RX ORDER — ACETAMINOPHEN 500 MG
2 TABLET ORAL
Qty: 0 | Refills: 0 | DISCHARGE

## 2022-04-29 ENCOUNTER — APPOINTMENT (OUTPATIENT)
Dept: INFUSION THERAPY | Facility: HOSPITAL | Age: 62
End: 2022-04-29

## 2022-04-29 ENCOUNTER — NON-APPOINTMENT (OUTPATIENT)
Age: 62
End: 2022-04-29

## 2022-04-29 NOTE — HISTORY OF PRESENT ILLNESS
[Disease: _____________________] : Disease: [unfilled] [T: ___] : T[unfilled] [N: ___] : N[unfilled] [M: ___] : M[unfilled] [AJCC Stage: ____] : AJCC Stage: [unfilled] [de-identified] : Patient is a 61 y/o M with known PMHx of HTN and pre-diabetes who first presented to a GI, Dr. Paul, 9/29/21 c/o abdominal bloating and heart burn symptoms especially at night progressive for ~ 2 year.  He was initially managed conservatively with an ordered H. pylori test (patient never went for testing), trial of PPI and life style modifications. Patient states that he saw his PCP in October 2021 for routine follow up and was noted to have a drop in his Hgb from 13.9 to 12.3.  He was started on OTC iron supplements and repeat labs 1/2022 were improved.  Given the HANANE and the GI symptoms, the patient returned to a GI for re-evaluation. The patient presented to Dr. Juan Antonio Steinberg on 2/20/22 and an endoscopy was performed on 2/21/22.  The EGD showed evidence of reflux esophagitis at the EG-junction, nonactive mild gastritis in the antrum and a large ulcer was seen at incisura.  Biopsies taken at the stomach antrum showed active chronic H. pylori gastritis with intestinal metaplasia.  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  A CT A/P performed on 2/25/22 showed a sequela of small airways inflammatory disease/bronchiolitis within the left lower lobe and to a lesser extent right middle and right lower lobes of the lungs. The stomach was only partially distended but there was no definite gastric mass identified.  There was otherwise no evidence of metastatic disease in the abdomen.  The patient started treatment for H. pylori earlier this week and a colonoscopy was performed 2 days ago with a polyp removed and results pending.  The patient presents today to establish oncologic care.\par \par SHx - previous tobacco use - 7.5 pack year history; quit 15 yrs ago;  current alcohol use ~ 3 beers/ day\par COVID - vaccine x 3 (booster 12/2021)\par FHx - no known cancer history\par \par EUS T3NO\par Dx lap negative  [de-identified] : moderately differentiated intramucosal carcinoma seen with anastomosing glands lined by cells with hyperchromatic and pleomorphic nuclei.  No submucosa is present.  A background of gastritis and acute inflammatory exudate is noted [de-identified] : CEA [de-identified] : presents in follow up, no acute complaints\par recovered from VATS and Dx Lap\par + MAC on lung nodule \par no fever, chills , cough or pulmonary symptoms

## 2022-04-29 NOTE — PHYSICAL EXAM
[Fully active, able to carry on all pre-disease performance without restriction] : Status 0 - Fully active, able to carry on all pre-disease performance without restriction [Normal] : affect appropriate [de-identified] : anicteric  [de-identified] : no jVD  [de-identified] : normal respiratory effort, no audible wheeze [de-identified] : reg rate  [de-identified] : well healed incsions

## 2022-05-09 ENCOUNTER — APPOINTMENT (OUTPATIENT)
Dept: HEMATOLOGY ONCOLOGY | Facility: CLINIC | Age: 62
End: 2022-05-09
Payer: COMMERCIAL

## 2022-05-09 VITALS
BODY MASS INDEX: 25.53 KG/M2 | DIASTOLIC BLOOD PRESSURE: 84 MMHG | SYSTOLIC BLOOD PRESSURE: 129 MMHG | OXYGEN SATURATION: 97 % | TEMPERATURE: 97.2 F | RESPIRATION RATE: 15 BRPM | HEART RATE: 66 BPM | WEIGHT: 177.91 LBS

## 2022-05-09 PROCEDURE — 99214 OFFICE O/P EST MOD 30 MIN: CPT

## 2022-05-09 NOTE — PHYSICAL EXAM
[Fully active, able to carry on all pre-disease performance without restriction] : Status 0 - Fully active, able to carry on all pre-disease performance without restriction [Normal] : affect appropriate [de-identified] : anicteric  [de-identified] : no jVD  [de-identified] : normal respiratory effort, no audible wheeze [de-identified] : reg rate  [de-identified] : well healed incsions

## 2022-05-09 NOTE — HISTORY OF PRESENT ILLNESS
[Disease: _____________________] : Disease: [unfilled] [T: ___] : T[unfilled] [N: ___] : N[unfilled] [M: ___] : M[unfilled] [AJCC Stage: ____] : AJCC Stage: [unfilled] [de-identified] : Patient is a 61 y/o M with known PMHx of HTN and pre-diabetes who first presented to a GI, Dr. Paul, 9/29/21 c/o abdominal bloating and heart burn symptoms especially at night progressive for ~ 2 year.  He was initially managed conservatively with an ordered H. pylori test (patient never went for testing), trial of PPI and life style modifications. Patient states that he saw his PCP in October 2021 for routine follow up and was noted to have a drop in his Hgb from 13.9 to 12.3.  He was started on OTC iron supplements and repeat labs 1/2022 were improved.  Given the HANANE and the GI symptoms, the patient returned to a GI for re-evaluation. The patient presented to Dr. Juan Antonio Steinberg on 2/20/22 and an endoscopy was performed on 2/21/22.  The EGD showed evidence of reflux esophagitis at the EG-junction, nonactive mild gastritis in the antrum and a large ulcer was seen at incisura.  Biopsies taken at the stomach antrum showed active chronic H. pylori gastritis with intestinal metaplasia.  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  A CT A/P performed on 2/25/22 showed a sequela of small airways inflammatory disease/bronchiolitis within the left lower lobe and to a lesser extent right middle and right lower lobes of the lungs. The stomach was only partially distended but there was no definite gastric mass identified.  There was otherwise no evidence of metastatic disease in the abdomen.  The patient started treatment for H. pylori earlier this week and a colonoscopy was performed 2 days ago with a polyp removed and results pending.  The patient presents today to establish oncologic care.\par \par SHx - previous tobacco use - 7.5 pack year history; quit 15 yrs ago;  current alcohol use ~ 3 beers/ day\par COVID - vaccine x 3 (booster 12/2021)\par FHx - no known cancer history\par \par EUS T3NO\par Dx lap negative  [de-identified] : moderately differentiated intramucosal carcinoma seen with anastomosing glands lined by cells with hyperchromatic and pleomorphic nuclei.  No submucosa is present.  A background of gastritis and acute inflammatory exudate is noted [de-identified] : CEA [FreeTextEntry1] : FLOT  [de-identified] : tolerated well\par minimal neuropathy\par +fatigue \par no fever or chills \par no abdomninal pain \par no emesis

## 2022-05-12 ENCOUNTER — RESULT REVIEW (OUTPATIENT)
Age: 62
End: 2022-05-12

## 2022-05-12 ENCOUNTER — APPOINTMENT (OUTPATIENT)
Dept: INFUSION THERAPY | Facility: HOSPITAL | Age: 62
End: 2022-05-12

## 2022-05-12 LAB
ALBUMIN SERPL ELPH-MCNC: 4.3 G/DL — SIGNIFICANT CHANGE UP (ref 3.3–5)
ALP SERPL-CCNC: 69 U/L — SIGNIFICANT CHANGE UP (ref 40–120)
ALT FLD-CCNC: 15 U/L — SIGNIFICANT CHANGE UP (ref 10–45)
ANION GAP SERPL CALC-SCNC: 20 MMOL/L — HIGH (ref 5–17)
AST SERPL-CCNC: 13 U/L — SIGNIFICANT CHANGE UP (ref 10–40)
BASOPHILS # BLD AUTO: 0.04 K/UL — SIGNIFICANT CHANGE UP (ref 0–0.2)
BASOPHILS NFR BLD AUTO: 0.8 % — SIGNIFICANT CHANGE UP (ref 0–2)
BILIRUB SERPL-MCNC: 0.3 MG/DL — SIGNIFICANT CHANGE UP (ref 0.2–1.2)
BUN SERPL-MCNC: 23 MG/DL — SIGNIFICANT CHANGE UP (ref 7–23)
CALCIUM SERPL-MCNC: 9.4 MG/DL — SIGNIFICANT CHANGE UP (ref 8.4–10.5)
CHLORIDE SERPL-SCNC: 104 MMOL/L — SIGNIFICANT CHANGE UP (ref 96–108)
CO2 SERPL-SCNC: 17 MMOL/L — LOW (ref 22–31)
CREAT SERPL-MCNC: 1.13 MG/DL — SIGNIFICANT CHANGE UP (ref 0.5–1.3)
EGFR: 73 ML/MIN/1.73M2 — SIGNIFICANT CHANGE UP
EOSINOPHIL # BLD AUTO: 0 K/UL — SIGNIFICANT CHANGE UP (ref 0–0.5)
EOSINOPHIL NFR BLD AUTO: 0 % — SIGNIFICANT CHANGE UP (ref 0–6)
GLUCOSE SERPL-MCNC: 180 MG/DL — HIGH (ref 70–99)
HCT VFR BLD CALC: 40.2 % — SIGNIFICANT CHANGE UP (ref 39–50)
HGB BLD-MCNC: 13.5 G/DL — SIGNIFICANT CHANGE UP (ref 13–17)
IMM GRANULOCYTES NFR BLD AUTO: 1.4 % — SIGNIFICANT CHANGE UP (ref 0–1.5)
LYMPHOCYTES # BLD AUTO: 1.29 K/UL — SIGNIFICANT CHANGE UP (ref 1–3.3)
LYMPHOCYTES # BLD AUTO: 25 % — SIGNIFICANT CHANGE UP (ref 13–44)
MCHC RBC-ENTMCNC: 30.8 PG — SIGNIFICANT CHANGE UP (ref 27–34)
MCHC RBC-ENTMCNC: 33.6 G/DL — SIGNIFICANT CHANGE UP (ref 32–36)
MCV RBC AUTO: 91.6 FL — SIGNIFICANT CHANGE UP (ref 80–100)
MONOCYTES # BLD AUTO: 0.34 K/UL — SIGNIFICANT CHANGE UP (ref 0–0.9)
MONOCYTES NFR BLD AUTO: 6.6 % — SIGNIFICANT CHANGE UP (ref 2–14)
NEUTROPHILS # BLD AUTO: 3.43 K/UL — SIGNIFICANT CHANGE UP (ref 1.8–7.4)
NEUTROPHILS NFR BLD AUTO: 66.2 % — SIGNIFICANT CHANGE UP (ref 43–77)
NRBC # BLD: 0 /100 WBCS — SIGNIFICANT CHANGE UP (ref 0–0)
PLATELET # BLD AUTO: 247 K/UL — SIGNIFICANT CHANGE UP (ref 150–400)
POTASSIUM SERPL-MCNC: 4.5 MMOL/L — SIGNIFICANT CHANGE UP (ref 3.5–5.3)
POTASSIUM SERPL-SCNC: 4.5 MMOL/L — SIGNIFICANT CHANGE UP (ref 3.5–5.3)
PROT SERPL-MCNC: 6.5 G/DL — SIGNIFICANT CHANGE UP (ref 6–8.3)
RBC # BLD: 4.39 M/UL — SIGNIFICANT CHANGE UP (ref 4.2–5.8)
RBC # FLD: 12 % — SIGNIFICANT CHANGE UP (ref 10.3–14.5)
SODIUM SERPL-SCNC: 142 MMOL/L — SIGNIFICANT CHANGE UP (ref 135–145)
WBC # BLD: 5.17 K/UL — SIGNIFICANT CHANGE UP (ref 3.8–10.5)
WBC # FLD AUTO: 5.17 K/UL — SIGNIFICANT CHANGE UP (ref 3.8–10.5)

## 2022-05-13 ENCOUNTER — APPOINTMENT (OUTPATIENT)
Dept: INFUSION THERAPY | Facility: HOSPITAL | Age: 62
End: 2022-05-13

## 2022-05-14 LAB
CULTURE RESULTS: SIGNIFICANT CHANGE UP
SPECIMEN SOURCE: SIGNIFICANT CHANGE UP

## 2022-05-20 ENCOUNTER — OUTPATIENT (OUTPATIENT)
Dept: OUTPATIENT SERVICES | Facility: HOSPITAL | Age: 62
LOS: 1 days | Discharge: HOME | End: 2022-05-20

## 2022-05-20 ENCOUNTER — APPOINTMENT (OUTPATIENT)
Dept: INFECTIOUS DISEASE | Facility: CLINIC | Age: 62
End: 2022-05-20
Payer: COMMERCIAL

## 2022-05-20 VITALS
DIASTOLIC BLOOD PRESSURE: 75 MMHG | SYSTOLIC BLOOD PRESSURE: 106 MMHG | WEIGHT: 179 LBS | HEIGHT: 70 IN | HEART RATE: 84 BPM | OXYGEN SATURATION: 98 % | TEMPERATURE: 97.9 F | RESPIRATION RATE: 15 BRPM | BODY MASS INDEX: 25.62 KG/M2

## 2022-05-20 DIAGNOSIS — Z98.890 OTHER SPECIFIED POSTPROCEDURAL STATES: Chronic | ICD-10-CM

## 2022-05-20 DIAGNOSIS — C16.9 MALIGNANT NEOPLASM OF STOMACH, UNSPECIFIED: ICD-10-CM

## 2022-05-20 PROCEDURE — 99214 OFFICE O/P EST MOD 30 MIN: CPT

## 2022-05-20 NOTE — PHYSICAL EXAM
[General Appearance - Alert] : alert [General Appearance - In No Acute Distress] : in no acute distress [General Appearance - Well-Appearing] : healthy appearing [Sclera] : the sclera and conjunctiva were normal [PERRL With Normal Accommodation] : pupils were equal in size, round, reactive to light [Extraocular Movements] : extraocular movements were intact [Outer Ear] : the ears and nose were normal in appearance [Oropharynx] : the oropharynx was normal with no thrush [Neck Appearance] : the appearance of the neck was normal [Neck Cervical Mass (___cm)] : no neck mass was observed [Jugular Venous Distention Increased] : there was no jugular-venous distention [Thyroid Diffuse Enlargement] : the thyroid was not enlarged [] : no respiratory distress [FreeTextEntry1] : few rales left base [Heart Rate And Rhythm] : heart rate was normal and rhythm regular [Heart Sounds] : normal S1 and S2 [Heart Sounds Gallop] : no gallops [Murmurs] : no murmurs [Heart Sounds Pericardial Friction Rub] : no pericardial rub [Edema] : there was no peripheral edema [Bowel Sounds] : normal bowel sounds [Abdomen Soft] : soft [Cervical Lymph Nodes Enlarged Posterior Bilaterally] : posterior cervical [Cervical Lymph Nodes Enlarged Anterior Bilaterally] : anterior cervical [Supraclavicular Lymph Nodes Enlarged Bilaterally] : supraclavicular [Musculoskeletal - Swelling] : no joint swelling [Nail Clubbing] : no clubbing  or cyanosis of the fingernails [Motor Tone] : muscle strength and tone were normal [Skin Lesions] : no skin lesions [No Focal Deficits] : no focal deficits [Oriented To Time, Place, And Person] : oriented to person, place, and time [Affect] : the affect was normal

## 2022-05-20 NOTE — HISTORY OF PRESENT ILLNESS
[FreeTextEntry1] : This is a 63 yo M with HTN and recently dx gastric cancer 2022 who presents today after RLL wedge resection was found to have MAC.\par \par Originally found to have 1 x 1 cm RLL nodule and on repeat imaging had grown quickly to 2 x 2 cm.  Concern for malignancy and had biopsy which was inconclusive. Proceeded with wedge resection and found to have granulomatous changes. GMS and AFB stain negative. Culture from tissue from 22 is negative to date but still in lab.  Sputum 1/3 from that time growing MAC.\par \par He has very minimal cough.\par Tolerating chemo for gastric cancer every 2 weeks.\par Weight is stable.\par \par PET/CT does reveal mucus impaction and bronchiectasis.\par He reports a very significant pneumonia at 5 yo when in [place] that he almost  from.\par Also was long term smoker\par Moved from [place] in .\par \par , 2 children.\[place] [facility] [place]

## 2022-05-20 NOTE — ASSESSMENT
[FreeTextEntry1] : This is a very pleasant  61 yo M with HTN and recently dx gastric cancer 2/2022 who presents today after RLL wedge resection was found to have MAC.\par \par Discussed at length the nature of bronchiectasis and MAC today.\par Pt did see pulmonary already, Dr. Rickey Keith and has planned f/up with him.\par I advised pt should get mucus clearing device such as acapella or aerobika to help clear mucus with his bronchiectasis.\par \par Unclear risk factor of bronchiectasis- maybe prior pneumonia and smoking history.\par \par For now his respiratory symptoms are nil to very mild.\par He is undergoing chemo for gastric cancer.\par I think meds for MAC will be hard to tolerate with chemo.\par \par As long as his chest imaging remains stable and pulmonary symptoms are stable, i think meds for MAC can be held for now.   Pt informed he may need treatment at later date and to monitor symptoms. \par \par He was given literature to review today.\par Given sputum cups x 3 to try and collect more phlegm as well.\par D/w Dr. Rivera prior to visit and messaged with f/up.\par \par ALl questions answered.\par If pt remains stable, can return in 6 months but is aware to come sooner if needed.

## 2022-05-20 NOTE — CONSULT LETTER
[Dear  ___] : Dear  [unfilled], [Consult Letter:] : I had the pleasure of evaluating your patient, [unfilled]. [Please see my note below.] : Please see my note below. [Consult Closing:] : Thank you very much for allowing me to participate in the care of this patient.  If you have any questions, please do not hesitate to contact me. [Sincerely,] : Sincerely, [FreeTextEntry2] : Dr. Parag Rivera [FreeTextEntry3] : \par Audelia Winkler MD\par  of Medicine\par Division of Infectious Diseases\par [facility] [employer] [facility]\par [address] [place] [address] DrSam\[place] [address]\par Tel: [phone]\par Fax: [phone] [DrSam  ___] : Dr. NUNEZ [DrSam ___] : Dr. NUNEZ

## 2022-05-26 ENCOUNTER — RESULT REVIEW (OUTPATIENT)
Age: 62
End: 2022-05-26

## 2022-05-26 ENCOUNTER — APPOINTMENT (OUTPATIENT)
Dept: INFUSION THERAPY | Facility: HOSPITAL | Age: 62
End: 2022-05-26

## 2022-05-26 DIAGNOSIS — R11.2 NAUSEA WITH VOMITING, UNSPECIFIED: ICD-10-CM

## 2022-05-26 DIAGNOSIS — Z51.11 ENCOUNTER FOR ANTINEOPLASTIC CHEMOTHERAPY: ICD-10-CM

## 2022-05-26 LAB
ALBUMIN SERPL ELPH-MCNC: 4.2 G/DL — SIGNIFICANT CHANGE UP (ref 3.3–5)
ALP SERPL-CCNC: 64 U/L — SIGNIFICANT CHANGE UP (ref 40–120)
ALT FLD-CCNC: 23 U/L — SIGNIFICANT CHANGE UP (ref 10–45)
ANION GAP SERPL CALC-SCNC: 16 MMOL/L — SIGNIFICANT CHANGE UP (ref 5–17)
AST SERPL-CCNC: 16 U/L — SIGNIFICANT CHANGE UP (ref 10–40)
BASOPHILS # BLD AUTO: 0.02 K/UL — SIGNIFICANT CHANGE UP (ref 0–0.2)
BASOPHILS NFR BLD AUTO: 0.4 % — SIGNIFICANT CHANGE UP (ref 0–2)
BILIRUB SERPL-MCNC: 0.3 MG/DL — SIGNIFICANT CHANGE UP (ref 0.2–1.2)
BUN SERPL-MCNC: 22 MG/DL — SIGNIFICANT CHANGE UP (ref 7–23)
CALCIUM SERPL-MCNC: 9.2 MG/DL — SIGNIFICANT CHANGE UP (ref 8.4–10.5)
CHLORIDE SERPL-SCNC: 103 MMOL/L — SIGNIFICANT CHANGE UP (ref 96–108)
CO2 SERPL-SCNC: 21 MMOL/L — LOW (ref 22–31)
CREAT SERPL-MCNC: 1.04 MG/DL — SIGNIFICANT CHANGE UP (ref 0.5–1.3)
EGFR: 81 ML/MIN/1.73M2 — SIGNIFICANT CHANGE UP
EOSINOPHIL # BLD AUTO: 0 K/UL — SIGNIFICANT CHANGE UP (ref 0–0.5)
EOSINOPHIL NFR BLD AUTO: 0 % — SIGNIFICANT CHANGE UP (ref 0–6)
GLUCOSE SERPL-MCNC: 171 MG/DL — HIGH (ref 70–99)
HCT VFR BLD CALC: 39.8 % — SIGNIFICANT CHANGE UP (ref 39–50)
HGB BLD-MCNC: 13.6 G/DL — SIGNIFICANT CHANGE UP (ref 13–17)
IMM GRANULOCYTES NFR BLD AUTO: 1.1 % — SIGNIFICANT CHANGE UP (ref 0–1.5)
LYMPHOCYTES # BLD AUTO: 1.05 K/UL — SIGNIFICANT CHANGE UP (ref 1–3.3)
LYMPHOCYTES # BLD AUTO: 19.3 % — SIGNIFICANT CHANGE UP (ref 13–44)
MCHC RBC-ENTMCNC: 31.2 PG — SIGNIFICANT CHANGE UP (ref 27–34)
MCHC RBC-ENTMCNC: 34.2 G/DL — SIGNIFICANT CHANGE UP (ref 32–36)
MCV RBC AUTO: 91.3 FL — SIGNIFICANT CHANGE UP (ref 80–100)
MONOCYTES # BLD AUTO: 0.21 K/UL — SIGNIFICANT CHANGE UP (ref 0–0.9)
MONOCYTES NFR BLD AUTO: 3.9 % — SIGNIFICANT CHANGE UP (ref 2–14)
NEUTROPHILS # BLD AUTO: 4.11 K/UL — SIGNIFICANT CHANGE UP (ref 1.8–7.4)
NEUTROPHILS NFR BLD AUTO: 75.3 % — SIGNIFICANT CHANGE UP (ref 43–77)
NRBC # BLD: 0 /100 WBCS — SIGNIFICANT CHANGE UP (ref 0–0)
PLATELET # BLD AUTO: 260 K/UL — SIGNIFICANT CHANGE UP (ref 150–400)
POTASSIUM SERPL-MCNC: 4.5 MMOL/L — SIGNIFICANT CHANGE UP (ref 3.5–5.3)
POTASSIUM SERPL-SCNC: 4.5 MMOL/L — SIGNIFICANT CHANGE UP (ref 3.5–5.3)
PROT SERPL-MCNC: 6.4 G/DL — SIGNIFICANT CHANGE UP (ref 6–8.3)
RBC # BLD: 4.36 M/UL — SIGNIFICANT CHANGE UP (ref 4.2–5.8)
RBC # FLD: 12.4 % — SIGNIFICANT CHANGE UP (ref 10.3–14.5)
SODIUM SERPL-SCNC: 140 MMOL/L — SIGNIFICANT CHANGE UP (ref 135–145)
WBC # BLD: 5.45 K/UL — SIGNIFICANT CHANGE UP (ref 3.8–10.5)
WBC # FLD AUTO: 5.45 K/UL — SIGNIFICANT CHANGE UP (ref 3.8–10.5)

## 2022-05-27 ENCOUNTER — APPOINTMENT (OUTPATIENT)
Dept: INFUSION THERAPY | Facility: HOSPITAL | Age: 62
End: 2022-05-27

## 2022-06-01 LAB
CULTURE RESULTS: SIGNIFICANT CHANGE UP
SPECIMEN SOURCE: SIGNIFICANT CHANGE UP

## 2022-06-04 LAB
CULTURE RESULTS: SIGNIFICANT CHANGE UP
SPECIMEN SOURCE: SIGNIFICANT CHANGE UP

## 2022-06-06 LAB — BACTERIA SPT CULT: ABNORMAL

## 2022-06-08 ENCOUNTER — APPOINTMENT (OUTPATIENT)
Dept: HEMATOLOGY ONCOLOGY | Facility: CLINIC | Age: 62
End: 2022-06-08
Payer: COMMERCIAL

## 2022-06-08 ENCOUNTER — RESULT REVIEW (OUTPATIENT)
Age: 62
End: 2022-06-08

## 2022-06-08 VITALS
TEMPERATURE: 98.4 F | DIASTOLIC BLOOD PRESSURE: 83 MMHG | BODY MASS INDEX: 25.98 KG/M2 | SYSTOLIC BLOOD PRESSURE: 134 MMHG | HEART RATE: 78 BPM | HEIGHT: 70 IN | WEIGHT: 181.44 LBS | OXYGEN SATURATION: 99 % | RESPIRATION RATE: 16 BRPM

## 2022-06-08 DIAGNOSIS — I10 ESSENTIAL (PRIMARY) HYPERTENSION: ICD-10-CM

## 2022-06-08 LAB
ALBUMIN SERPL ELPH-MCNC: 4.1 G/DL
ALP BLD-CCNC: 62 U/L
ALT SERPL-CCNC: 16 U/L
ANION GAP SERPL CALC-SCNC: 13 MMOL/L
AST SERPL-CCNC: 17 U/L
BASOPHILS # BLD AUTO: 0.12 K/UL — SIGNIFICANT CHANGE UP (ref 0–0.2)
BASOPHILS NFR BLD AUTO: 2.7 % — HIGH (ref 0–2)
BILIRUB SERPL-MCNC: 0.2 MG/DL
BUN SERPL-MCNC: 14 MG/DL
CALCIUM SERPL-MCNC: 9.2 MG/DL
CHLORIDE SERPL-SCNC: 105 MMOL/L
CO2 SERPL-SCNC: 24 MMOL/L
CREAT SERPL-MCNC: 1.29 MG/DL
EGFR: 63 ML/MIN/1.73M2
EOSINOPHIL # BLD AUTO: 0.26 K/UL — SIGNIFICANT CHANGE UP (ref 0–0.5)
EOSINOPHIL NFR BLD AUTO: 5.8 % — SIGNIFICANT CHANGE UP (ref 0–6)
GLUCOSE SERPL-MCNC: 112 MG/DL
HCT VFR BLD CALC: 38.5 % — LOW (ref 39–50)
HGB BLD-MCNC: 12.7 G/DL — LOW (ref 13–17)
IMM GRANULOCYTES NFR BLD AUTO: 2.2 % — HIGH (ref 0–1.5)
LYMPHOCYTES # BLD AUTO: 1.18 K/UL — SIGNIFICANT CHANGE UP (ref 1–3.3)
LYMPHOCYTES # BLD AUTO: 26.5 % — SIGNIFICANT CHANGE UP (ref 13–44)
MCHC RBC-ENTMCNC: 30.8 PG — SIGNIFICANT CHANGE UP (ref 27–34)
MCHC RBC-ENTMCNC: 33 G/DL — SIGNIFICANT CHANGE UP (ref 32–36)
MCV RBC AUTO: 93.2 FL — SIGNIFICANT CHANGE UP (ref 80–100)
MONOCYTES # BLD AUTO: 0.86 K/UL — SIGNIFICANT CHANGE UP (ref 0–0.9)
MONOCYTES NFR BLD AUTO: 19.3 % — HIGH (ref 2–14)
NEUTROPHILS # BLD AUTO: 1.93 K/UL — SIGNIFICANT CHANGE UP (ref 1.8–7.4)
NEUTROPHILS NFR BLD AUTO: 43.5 % — SIGNIFICANT CHANGE UP (ref 43–77)
NRBC # BLD: 0 /100 WBCS — SIGNIFICANT CHANGE UP (ref 0–0)
PLATELET # BLD AUTO: 205 K/UL — SIGNIFICANT CHANGE UP (ref 150–400)
POTASSIUM SERPL-SCNC: 4.9 MMOL/L
PROT SERPL-MCNC: 6.3 G/DL
RBC # BLD: 4.13 M/UL — LOW (ref 4.2–5.8)
RBC # FLD: 12.9 % — SIGNIFICANT CHANGE UP (ref 10.3–14.5)
SODIUM SERPL-SCNC: 141 MMOL/L
WBC # BLD: 4.45 K/UL — SIGNIFICANT CHANGE UP (ref 3.8–10.5)
WBC # FLD AUTO: 4.45 K/UL — SIGNIFICANT CHANGE UP (ref 3.8–10.5)

## 2022-06-08 PROCEDURE — 99214 OFFICE O/P EST MOD 30 MIN: CPT

## 2022-06-08 RX ORDER — VALSARTAN 160 MG/1
160 TABLET, COATED ORAL DAILY
Refills: 0 | Status: COMPLETED | COMMUNITY
Start: 2022-03-03 | End: 2022-06-08

## 2022-06-08 NOTE — HISTORY OF PRESENT ILLNESS
[Disease: _____________________] : Disease: [unfilled] [T: ___] : T[unfilled] [N: ___] : N[unfilled] [M: ___] : M[unfilled] [AJCC Stage: ____] : AJCC Stage: [unfilled] [de-identified] : Patient is a 63 y/o M with known PMHx of HTN and pre-diabetes who first presented to a GI, Dr. Paul, 9/29/21 c/o abdominal bloating and heart burn symptoms especially at night progressive for ~ 2 year.  He was initially managed conservatively with an ordered H. pylori test (patient never went for testing), trial of PPI and life style modifications. Patient states that he saw his PCP in October 2021 for routine follow up and was noted to have a drop in his Hgb from 13.9 to 12.3.  He was started on OTC iron supplements and repeat labs 1/2022 were improved.  Given the HANANE and the GI symptoms, the patient returned to a GI for re-evaluation. The patient presented to Dr. Juan Antonio Steinberg on 2/20/22 and an endoscopy was performed on 2/21/22.  The EGD showed evidence of reflux esophagitis at the EG-junction, nonactive mild gastritis in the antrum and a large ulcer was seen at incisura.  Biopsies taken at the stomach antrum showed active chronic H. pylori gastritis with intestinal metaplasia.  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  A CT A/P performed on 2/25/22 showed a sequela of small airways inflammatory disease/bronchiolitis within the left lower lobe and to a lesser extent right middle and right lower lobes of the lungs. The stomach was only partially distended but there was no definite gastric mass identified.  There was otherwise no evidence of metastatic disease in the abdomen.  The patient started treatment for H. pylori earlier this week and a colonoscopy was performed 2 days ago with a polyp removed and results pending.  The patient presents today to establish oncologic care.\par \par SHx - previous tobacco use - 7.5 pack year history; quit 15 yrs ago;  current alcohol use ~ 3 beers/ day\par COVID - vaccine x 3 (booster 12/2021)\par FHx - no known cancer history\par \par EUS T3NO\par Dx lap negative  [de-identified] : moderately differentiated intramucosal carcinoma seen with anastomosing glands lined by cells with hyperchromatic and pleomorphic nuclei.  No submucosa is present.  A background of gastritis and acute inflammatory exudate is noted [de-identified] : CEA [FreeTextEntry1] : FLOT  [de-identified] : Patient presents for follow up today and is scheduled for C4 of FLOT tomorrow.  He reports tolerating treatment with mild fatigue and nausea.  He reports that his BP has improved because he self decreased his dose of Valsartan by 1/2 to 80 mg daily (spoke with his daughter who is a pharmacist first).  He reports overall good appetite.  Denies abdominal pain, vomiting, diarrhea, constipation, weight loss or swelling.  He reports that he followed up with ID since his last visit and had positive sputum cultures but denies any respiratory symptoms so plan is to continue to observe for now.

## 2022-06-08 NOTE — PHYSICAL EXAM
[Fully active, able to carry on all pre-disease performance without restriction] : Status 0 - Fully active, able to carry on all pre-disease performance without restriction [Normal] : affect appropriate [de-identified] : anicteric  [de-identified] : no jVD  [de-identified] : normal respiratory effort, no audible wheeze [de-identified] : reg rate  [de-identified] : no LE swelling B/L [de-identified] : well healed incsions

## 2022-06-09 ENCOUNTER — RESULT REVIEW (OUTPATIENT)
Age: 62
End: 2022-06-09

## 2022-06-09 ENCOUNTER — NON-APPOINTMENT (OUTPATIENT)
Age: 62
End: 2022-06-09

## 2022-06-09 ENCOUNTER — APPOINTMENT (OUTPATIENT)
Dept: INFUSION THERAPY | Facility: HOSPITAL | Age: 62
End: 2022-06-09

## 2022-06-09 LAB
ALBUMIN SERPL ELPH-MCNC: 4.2 G/DL — SIGNIFICANT CHANGE UP (ref 3.3–5)
ALP SERPL-CCNC: 58 U/L — SIGNIFICANT CHANGE UP (ref 40–120)
ALT FLD-CCNC: 17 U/L — SIGNIFICANT CHANGE UP (ref 10–45)
ANION GAP SERPL CALC-SCNC: 16 MMOL/L — SIGNIFICANT CHANGE UP (ref 5–17)
AST SERPL-CCNC: 18 U/L — SIGNIFICANT CHANGE UP (ref 10–40)
BILIRUB SERPL-MCNC: 0.4 MG/DL — SIGNIFICANT CHANGE UP (ref 0.2–1.2)
BUN SERPL-MCNC: 20 MG/DL — SIGNIFICANT CHANGE UP (ref 7–23)
CALCIUM SERPL-MCNC: 9 MG/DL — SIGNIFICANT CHANGE UP (ref 8.4–10.5)
CHLORIDE SERPL-SCNC: 102 MMOL/L — SIGNIFICANT CHANGE UP (ref 96–108)
CO2 SERPL-SCNC: 21 MMOL/L — LOW (ref 22–31)
CREAT SERPL-MCNC: 1.2 MG/DL — SIGNIFICANT CHANGE UP (ref 0.5–1.3)
EGFR: 68 ML/MIN/1.73M2 — SIGNIFICANT CHANGE UP
GLUCOSE SERPL-MCNC: 199 MG/DL — HIGH (ref 70–99)
POTASSIUM SERPL-MCNC: 4.3 MMOL/L — SIGNIFICANT CHANGE UP (ref 3.5–5.3)
POTASSIUM SERPL-SCNC: 4.3 MMOL/L — SIGNIFICANT CHANGE UP (ref 3.5–5.3)
PROT SERPL-MCNC: 6.3 G/DL — SIGNIFICANT CHANGE UP (ref 6–8.3)
SODIUM SERPL-SCNC: 139 MMOL/L — SIGNIFICANT CHANGE UP (ref 135–145)

## 2022-06-10 ENCOUNTER — APPOINTMENT (OUTPATIENT)
Dept: INFUSION THERAPY | Facility: HOSPITAL | Age: 62
End: 2022-06-10

## 2022-06-14 ENCOUNTER — RESULT REVIEW (OUTPATIENT)
Age: 62
End: 2022-06-14

## 2022-06-16 ENCOUNTER — OUTPATIENT (OUTPATIENT)
Dept: OUTPATIENT SERVICES | Facility: HOSPITAL | Age: 62
LOS: 1 days | End: 2022-06-16
Payer: COMMERCIAL

## 2022-06-16 ENCOUNTER — APPOINTMENT (OUTPATIENT)
Dept: CT IMAGING | Facility: IMAGING CENTER | Age: 62
End: 2022-06-16
Payer: COMMERCIAL

## 2022-06-16 DIAGNOSIS — Z98.890 OTHER SPECIFIED POSTPROCEDURAL STATES: Chronic | ICD-10-CM

## 2022-06-16 DIAGNOSIS — C16.9 MALIGNANT NEOPLASM OF STOMACH, UNSPECIFIED: ICD-10-CM

## 2022-06-16 PROCEDURE — 74177 CT ABD & PELVIS W/CONTRAST: CPT

## 2022-06-16 PROCEDURE — 71260 CT THORAX DX C+: CPT

## 2022-06-16 PROCEDURE — 71260 CT THORAX DX C+: CPT | Mod: 26

## 2022-06-16 PROCEDURE — 74177 CT ABD & PELVIS W/CONTRAST: CPT | Mod: 26

## 2022-06-22 ENCOUNTER — APPOINTMENT (OUTPATIENT)
Dept: HEMATOLOGY ONCOLOGY | Facility: CLINIC | Age: 62
End: 2022-06-22
Payer: COMMERCIAL

## 2022-06-22 VITALS
DIASTOLIC BLOOD PRESSURE: 79 MMHG | OXYGEN SATURATION: 99 % | TEMPERATURE: 97.9 F | WEIGHT: 181.88 LBS | HEART RATE: 79 BPM | BODY MASS INDEX: 26.04 KG/M2 | SYSTOLIC BLOOD PRESSURE: 136 MMHG | HEIGHT: 70 IN | RESPIRATION RATE: 16 BRPM

## 2022-06-22 PROCEDURE — 99214 OFFICE O/P EST MOD 30 MIN: CPT

## 2022-06-22 NOTE — PHYSICAL EXAM
[Fully active, able to carry on all pre-disease performance without restriction] : Status 0 - Fully active, able to carry on all pre-disease performance without restriction [Normal] : affect appropriate [de-identified] : anicteric  [de-identified] : no jVD  [de-identified] : normal respiratory effort, no audible wheeze [de-identified] : reg rate  [de-identified] : no LE swelling B/L [de-identified] : well healed incsions

## 2022-06-22 NOTE — HISTORY OF PRESENT ILLNESS
[Disease: _____________________] : Disease: [unfilled] [T: ___] : T[unfilled] [N: ___] : N[unfilled] [M: ___] : M[unfilled] [AJCC Stage: ____] : AJCC Stage: [unfilled] [de-identified] : Patient is a 63 y/o M with known PMHx of HTN and pre-diabetes who first presented to a GI, Dr. Paul, 9/29/21 c/o abdominal bloating and heart burn symptoms especially at night progressive for ~ 2 year.  He was initially managed conservatively with an ordered H. pylori test (patient never went for testing), trial of PPI and life style modifications. Patient states that he saw his PCP in October 2021 for routine follow up and was noted to have a drop in his Hgb from 13.9 to 12.3.  He was started on OTC iron supplements and repeat labs 1/2022 were improved.  Given the HANANE and the GI symptoms, the patient returned to a GI for re-evaluation. The patient presented to Dr. Juan Antonio Steinberg on 2/20/22 and an endoscopy was performed on 2/21/22.  The EGD showed evidence of reflux esophagitis at the EG-junction, nonactive mild gastritis in the antrum and a large ulcer was seen at incisura.  Biopsies taken at the stomach antrum showed active chronic H. pylori gastritis with intestinal metaplasia.  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  A CT A/P performed on 2/25/22 showed a sequela of small airways inflammatory disease/bronchiolitis within the left lower lobe and to a lesser extent right middle and right lower lobes of the lungs. The stomach was only partially distended but there was no definite gastric mass identified.  There was otherwise no evidence of metastatic disease in the abdomen.  The patient started treatment for H. pylori earlier this week and a colonoscopy was performed 2 days ago with a polyp removed and results pending.  The patient presents today to establish oncologic care.\par \par SHx - previous tobacco use - 7.5 pack year history; quit 15 yrs ago;  current alcohol use ~ 3 beers/ day\par COVID - vaccine x 3 (booster 12/2021)\par FHx - no known cancer history\par \par EUS T3NO\par Dx lap negative  [de-identified] : moderately differentiated intramucosal carcinoma seen with anastomosing glands lined by cells with hyperchromatic and pleomorphic nuclei.  No submucosa is present.  A background of gastritis and acute inflammatory exudate is noted [de-identified] : CEA [FreeTextEntry1] : FLOT x 4 [de-identified] : tolerated FLOT to date \par minimal neuropathy\par +fatigue \par no fever or chills \par no abdomninal pain \par no emesis \par here to review recent imaging

## 2022-06-23 ENCOUNTER — RESULT REVIEW (OUTPATIENT)
Age: 62
End: 2022-06-23

## 2022-06-23 ENCOUNTER — APPOINTMENT (OUTPATIENT)
Dept: SURGICAL ONCOLOGY | Facility: CLINIC | Age: 62
End: 2022-06-23
Payer: COMMERCIAL

## 2022-06-23 ENCOUNTER — APPOINTMENT (OUTPATIENT)
Dept: INFUSION THERAPY | Facility: HOSPITAL | Age: 62
End: 2022-06-23

## 2022-06-23 VITALS
HEART RATE: 70 BPM | DIASTOLIC BLOOD PRESSURE: 92 MMHG | SYSTOLIC BLOOD PRESSURE: 158 MMHG | BODY MASS INDEX: 25.91 KG/M2 | OXYGEN SATURATION: 95 % | WEIGHT: 181 LBS | RESPIRATION RATE: 15 BRPM | HEIGHT: 70 IN | TEMPERATURE: 98.5 F

## 2022-06-23 LAB
ALBUMIN SERPL ELPH-MCNC: 4.3 G/DL — SIGNIFICANT CHANGE UP (ref 3.3–5)
ALP SERPL-CCNC: 72 U/L — SIGNIFICANT CHANGE UP (ref 40–120)
ALT FLD-CCNC: 26 U/L — SIGNIFICANT CHANGE UP (ref 10–45)
ANION GAP SERPL CALC-SCNC: 14 MMOL/L — SIGNIFICANT CHANGE UP (ref 5–17)
AST SERPL-CCNC: 21 U/L — SIGNIFICANT CHANGE UP (ref 10–40)
BASOPHILS # BLD AUTO: 0.03 K/UL — SIGNIFICANT CHANGE UP (ref 0–0.2)
BASOPHILS NFR BLD AUTO: 0.6 % — SIGNIFICANT CHANGE UP (ref 0–2)
BILIRUB SERPL-MCNC: 0.3 MG/DL — SIGNIFICANT CHANGE UP (ref 0.2–1.2)
BUN SERPL-MCNC: 22 MG/DL — SIGNIFICANT CHANGE UP (ref 7–23)
CALCIUM SERPL-MCNC: 9.5 MG/DL — SIGNIFICANT CHANGE UP (ref 8.4–10.5)
CHLORIDE SERPL-SCNC: 103 MMOL/L — SIGNIFICANT CHANGE UP (ref 96–108)
CO2 SERPL-SCNC: 21 MMOL/L — LOW (ref 22–31)
CREAT SERPL-MCNC: 1.08 MG/DL — SIGNIFICANT CHANGE UP (ref 0.5–1.3)
EGFR: 78 ML/MIN/1.73M2 — SIGNIFICANT CHANGE UP
EOSINOPHIL # BLD AUTO: 0 K/UL — SIGNIFICANT CHANGE UP (ref 0–0.5)
EOSINOPHIL NFR BLD AUTO: 0 % — SIGNIFICANT CHANGE UP (ref 0–6)
GLUCOSE SERPL-MCNC: 172 MG/DL — HIGH (ref 70–99)
HCT VFR BLD CALC: 37.6 % — LOW (ref 39–50)
HGB BLD-MCNC: 12.7 G/DL — LOW (ref 13–17)
IMM GRANULOCYTES NFR BLD AUTO: 3.1 % — HIGH (ref 0–1.5)
LYMPHOCYTES # BLD AUTO: 1.02 K/UL — SIGNIFICANT CHANGE UP (ref 1–3.3)
LYMPHOCYTES # BLD AUTO: 20 % — SIGNIFICANT CHANGE UP (ref 13–44)
MCHC RBC-ENTMCNC: 29.7 PG — SIGNIFICANT CHANGE UP (ref 27–34)
MCHC RBC-ENTMCNC: 33.8 G/DL — SIGNIFICANT CHANGE UP (ref 32–36)
MCV RBC AUTO: 88.1 FL — SIGNIFICANT CHANGE UP (ref 80–100)
MONOCYTES # BLD AUTO: 0.12 K/UL — SIGNIFICANT CHANGE UP (ref 0–0.9)
MONOCYTES NFR BLD AUTO: 2.3 % — SIGNIFICANT CHANGE UP (ref 2–14)
NEUTROPHILS # BLD AUTO: 3.78 K/UL — SIGNIFICANT CHANGE UP (ref 1.8–7.4)
NEUTROPHILS NFR BLD AUTO: 74 % — SIGNIFICANT CHANGE UP (ref 43–77)
NRBC # BLD: 0 /100 WBCS — SIGNIFICANT CHANGE UP (ref 0–0)
PLATELET # BLD AUTO: 253 K/UL — SIGNIFICANT CHANGE UP (ref 150–400)
POTASSIUM SERPL-MCNC: 4.4 MMOL/L — SIGNIFICANT CHANGE UP (ref 3.5–5.3)
POTASSIUM SERPL-SCNC: 4.4 MMOL/L — SIGNIFICANT CHANGE UP (ref 3.5–5.3)
PROT SERPL-MCNC: 6.4 G/DL — SIGNIFICANT CHANGE UP (ref 6–8.3)
RBC # BLD: 4.27 M/UL — SIGNIFICANT CHANGE UP (ref 4.2–5.8)
RBC # FLD: 13.1 % — SIGNIFICANT CHANGE UP (ref 10.3–14.5)
SODIUM SERPL-SCNC: 138 MMOL/L — SIGNIFICANT CHANGE UP (ref 135–145)
WBC # BLD: 5.11 K/UL — SIGNIFICANT CHANGE UP (ref 3.8–10.5)
WBC # FLD AUTO: 5.11 K/UL — SIGNIFICANT CHANGE UP (ref 3.8–10.5)

## 2022-06-23 PROCEDURE — 99214 OFFICE O/P EST MOD 30 MIN: CPT

## 2022-06-23 NOTE — ASSESSMENT
[FreeTextEntry1] : Tolerating preoperative chemotherapy for clinical T3N0 distal gastric cancer.\par Plan for robotic distal gastrectomy in 4 - 6 weeks after cycle 5.\par Pre-surgical EGD to assess response.\par Risks/benefits/operative details discussed with the patient/spouse in detail.\par All questions answered.\par He wishes to proceed as recommended.

## 2022-06-23 NOTE — PHYSICAL EXAM
[Normal] : supple, no neck mass and thyroid not enlarged [Normal Neck Lymph Nodes] : normal neck lymph nodes  [Normal Supraclavicular Lymph Nodes] : normal supraclavicular lymph nodes [Normal Groin Lymph Nodes] : normal groin lymph nodes [Normal Axillary Lymph Nodes] : normal axillary lymph nodes [Normal] : oriented to person, place and time, with appropriate affect [de-identified] : well healed laparoscopy incisions.

## 2022-06-23 NOTE — HISTORY OF PRESENT ILLNESS
[de-identified] : Mr. Burden is a 61 y/o male wth history of dyspepsia/acid reflux.  He was recently found to be slightly anemic on routine blood work.  He underwent his first EGD by Dr. 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  Overall he is tolerating well.  He is eating and maintaining well.  He denies abdominal pain.\par Restaging CT chest/abdomen/pelvis 06/16/2022 does not show evidence of gastric cancer progression.

## 2022-06-24 ENCOUNTER — APPOINTMENT (OUTPATIENT)
Dept: INFUSION THERAPY | Facility: HOSPITAL | Age: 62
End: 2022-06-24

## 2022-06-27 LAB — FUNGUS SPT CULT: NORMAL

## 2022-07-02 ENCOUNTER — LABORATORY RESULT (OUTPATIENT)
Age: 62
End: 2022-07-02

## 2022-07-05 ENCOUNTER — APPOINTMENT (OUTPATIENT)
Dept: GASTROENTEROLOGY | Facility: HOSPITAL | Age: 62
End: 2022-07-05

## 2022-07-05 ENCOUNTER — TRANSCRIPTION ENCOUNTER (OUTPATIENT)
Age: 62
End: 2022-07-05

## 2022-07-05 ENCOUNTER — OUTPATIENT (OUTPATIENT)
Dept: OUTPATIENT SERVICES | Facility: HOSPITAL | Age: 62
LOS: 1 days | End: 2022-07-05
Payer: COMMERCIAL

## 2022-07-05 ENCOUNTER — RESULT REVIEW (OUTPATIENT)
Age: 62
End: 2022-07-05

## 2022-07-05 VITALS
DIASTOLIC BLOOD PRESSURE: 75 MMHG | OXYGEN SATURATION: 99 % | HEART RATE: 61 BPM | SYSTOLIC BLOOD PRESSURE: 101 MMHG | RESPIRATION RATE: 15 BRPM

## 2022-07-05 VITALS
WEIGHT: 179.9 LBS | OXYGEN SATURATION: 98 % | HEIGHT: 70 IN | SYSTOLIC BLOOD PRESSURE: 127 MMHG | DIASTOLIC BLOOD PRESSURE: 109 MMHG | TEMPERATURE: 98 F | RESPIRATION RATE: 12 BRPM | HEART RATE: 91 BPM

## 2022-07-05 DIAGNOSIS — C16.9 MALIGNANT NEOPLASM OF STOMACH, UNSPECIFIED: ICD-10-CM

## 2022-07-05 DIAGNOSIS — S12.9XXA FRACTURE OF NECK, UNSPECIFIED, INITIAL ENCOUNTER: Chronic | ICD-10-CM

## 2022-07-05 DIAGNOSIS — Z98.890 OTHER SPECIFIED POSTPROCEDURAL STATES: Chronic | ICD-10-CM

## 2022-07-05 PROCEDURE — 43259 EGD US EXAM DUODENUM/JEJUNUM: CPT

## 2022-07-05 PROCEDURE — 88305 TISSUE EXAM BY PATHOLOGIST: CPT

## 2022-07-05 PROCEDURE — 88305 TISSUE EXAM BY PATHOLOGIST: CPT | Mod: 26

## 2022-07-05 PROCEDURE — 43239 EGD BIOPSY SINGLE/MULTIPLE: CPT

## 2022-07-05 RX ORDER — LIDOCAINE HCL 20 MG/ML
4 VIAL (ML) INJECTION ONCE
Refills: 0 | Status: DISCONTINUED | OUTPATIENT
Start: 2022-07-05 | End: 2022-07-19

## 2022-07-05 NOTE — ASU DISCHARGE PLAN (ADULT/PEDIATRIC) - NS MD DC FALL RISK RISK
For information on Fall & Injury Prevention, visit: [email or web address] OR  [email or web address] OR  https://www.cdc.gov/steadi/patient.html

## 2022-07-05 NOTE — ASU DISCHARGE PLAN (ADULT/PEDIATRIC) - NSDISCH_BIOPSY_ENDO_ALL_CORE_FT
If you had a biopsy, you should not take aspirin or aspirin like products for the next 10 days unless instructed to do so by your doctor. If you had a biopsy, check with your doctor before taking any blood thinners such as warfarin (Coumadin). normal (ped)... In no apparent distress, appears well developed and well nourished.

## 2022-07-05 NOTE — ASU PATIENT PROFILE, ADULT - NSICDXPASTSURGICALHX_GEN_ALL_CORE_FT
PAST SURGICAL HISTORY:  H/O colonoscopy     Open wedge fracture of cervical vertebra     S/P endoscopy

## 2022-07-05 NOTE — PRE-ANESTHESIA EVALUATION ADULT - NSANTHINDVINFOSD_GEN_ALL_CORE
likely 2/2 to acute loss post op on chronic iron deficiency anemia  - Blood transfusion consent signed and placed in chart  - Maintain active type and screen  - s/p 2u PRBC perioperatively for acute blood loss anemia   - this AM Hgb of 8.3 - will transfuse 1 unit prbc  - iron studies noted  - F/u AM CBC- improved to Hgb 10 overnight, Hgb 9.4 this AM  - Patient noted to have blood in stool on 5/29, was straining to have BM and as per family has hx of hemorrhoids, FOBT negative, repeat BM without blood, GI consulted, appreciate recs. patient

## 2022-07-05 NOTE — ASU PATIENT PROFILE, ADULT - TEACHING/LEARNING EDUCATIONAL LEVEL
How Severe Is Your Skin Lesion?: moderate
Has Your Skin Lesion Been Treated?: not been treated
Is This A New Presentation, Or A Follow-Up?: Skin Lesions
[facility]

## 2022-07-06 LAB — SURGICAL PATHOLOGY STUDY: SIGNIFICANT CHANGE UP

## 2022-07-07 ENCOUNTER — APPOINTMENT (OUTPATIENT)
Dept: INFUSION THERAPY | Facility: HOSPITAL | Age: 62
End: 2022-07-07

## 2022-07-08 ENCOUNTER — APPOINTMENT (OUTPATIENT)
Dept: INFUSION THERAPY | Facility: HOSPITAL | Age: 62
End: 2022-07-08

## 2022-07-11 ENCOUNTER — OUTPATIENT (OUTPATIENT)
Dept: OUTPATIENT SERVICES | Facility: HOSPITAL | Age: 62
LOS: 1 days | End: 2022-07-11

## 2022-07-11 VITALS
HEART RATE: 94 BPM | OXYGEN SATURATION: 97 % | DIASTOLIC BLOOD PRESSURE: 86 MMHG | WEIGHT: 179.02 LBS | SYSTOLIC BLOOD PRESSURE: 140 MMHG | RESPIRATION RATE: 14 BRPM | HEIGHT: 69.5 IN | TEMPERATURE: 98 F

## 2022-07-11 DIAGNOSIS — Z98.890 OTHER SPECIFIED POSTPROCEDURAL STATES: Chronic | ICD-10-CM

## 2022-07-11 DIAGNOSIS — C16.9 MALIGNANT NEOPLASM OF STOMACH, UNSPECIFIED: ICD-10-CM

## 2022-07-11 DIAGNOSIS — S12.9XXA FRACTURE OF NECK, UNSPECIFIED, INITIAL ENCOUNTER: Chronic | ICD-10-CM

## 2022-07-11 DIAGNOSIS — I10 ESSENTIAL (PRIMARY) HYPERTENSION: ICD-10-CM

## 2022-07-11 DIAGNOSIS — Z91.89 OTHER SPECIFIED PERSONAL RISK FACTORS, NOT ELSEWHERE CLASSIFIED: ICD-10-CM

## 2022-07-11 DIAGNOSIS — Z95.828 PRESENCE OF OTHER VASCULAR IMPLANTS AND GRAFTS: Chronic | ICD-10-CM

## 2022-07-11 LAB
A1C WITH ESTIMATED AVERAGE GLUCOSE RESULT: 6 % — HIGH (ref 4–5.6)
ALBUMIN SERPL ELPH-MCNC: 3.8 G/DL — SIGNIFICANT CHANGE UP (ref 3.3–5)
ALP SERPL-CCNC: 71 U/L — SIGNIFICANT CHANGE UP (ref 40–120)
ALT FLD-CCNC: 27 U/L — SIGNIFICANT CHANGE UP (ref 4–41)
ANION GAP SERPL CALC-SCNC: 14 MMOL/L — SIGNIFICANT CHANGE UP (ref 7–14)
AST SERPL-CCNC: 28 U/L — SIGNIFICANT CHANGE UP (ref 4–40)
BILIRUB SERPL-MCNC: <0.2 MG/DL — SIGNIFICANT CHANGE UP (ref 0.2–1.2)
BLD GP AB SCN SERPL QL: NEGATIVE — SIGNIFICANT CHANGE UP
BUN SERPL-MCNC: 18 MG/DL — SIGNIFICANT CHANGE UP (ref 7–23)
CALCIUM SERPL-MCNC: 9.6 MG/DL — SIGNIFICANT CHANGE UP (ref 8.4–10.5)
CHLORIDE SERPL-SCNC: 101 MMOL/L — SIGNIFICANT CHANGE UP (ref 98–107)
CO2 SERPL-SCNC: 25 MMOL/L — SIGNIFICANT CHANGE UP (ref 22–31)
CREAT SERPL-MCNC: 1.33 MG/DL — HIGH (ref 0.5–1.3)
EGFR: 60 ML/MIN/1.73M2 — SIGNIFICANT CHANGE UP
ESTIMATED AVERAGE GLUCOSE: 126 — SIGNIFICANT CHANGE UP
GLUCOSE SERPL-MCNC: 131 MG/DL — HIGH (ref 70–99)
HCT VFR BLD CALC: 39.6 % — SIGNIFICANT CHANGE UP (ref 39–50)
HGB BLD-MCNC: 12.2 G/DL — LOW (ref 13–17)
MCHC RBC-ENTMCNC: 28.6 PG — SIGNIFICANT CHANGE UP (ref 27–34)
MCHC RBC-ENTMCNC: 30.8 GM/DL — LOW (ref 32–36)
MCV RBC AUTO: 92.7 FL — SIGNIFICANT CHANGE UP (ref 80–100)
NRBC # BLD: 0 /100 WBCS — SIGNIFICANT CHANGE UP
NRBC # FLD: 0 K/UL — SIGNIFICANT CHANGE UP
PLATELET # BLD AUTO: 259 K/UL — SIGNIFICANT CHANGE UP (ref 150–400)
POTASSIUM SERPL-MCNC: 4.3 MMOL/L — SIGNIFICANT CHANGE UP (ref 3.5–5.3)
POTASSIUM SERPL-SCNC: 4.3 MMOL/L — SIGNIFICANT CHANGE UP (ref 3.5–5.3)
PROT SERPL-MCNC: 7.1 G/DL — SIGNIFICANT CHANGE UP (ref 6–8.3)
RBC # BLD: 4.27 M/UL — SIGNIFICANT CHANGE UP (ref 4.2–5.8)
RBC # FLD: 14.4 % — SIGNIFICANT CHANGE UP (ref 10.3–14.5)
RH IG SCN BLD-IMP: POSITIVE — SIGNIFICANT CHANGE UP
SODIUM SERPL-SCNC: 140 MMOL/L — SIGNIFICANT CHANGE UP (ref 135–145)
WBC # BLD: 7.43 K/UL — SIGNIFICANT CHANGE UP (ref 3.8–10.5)
WBC # FLD AUTO: 7.43 K/UL — SIGNIFICANT CHANGE UP (ref 3.8–10.5)

## 2022-07-11 RX ORDER — SODIUM CHLORIDE 9 MG/ML
3 INJECTION INTRAMUSCULAR; INTRAVENOUS; SUBCUTANEOUS EVERY 8 HOURS
Refills: 0 | Status: DISCONTINUED | OUTPATIENT
Start: 2022-07-25 | End: 2022-07-28

## 2022-07-11 RX ORDER — SODIUM CHLORIDE 9 MG/ML
1000 INJECTION, SOLUTION INTRAVENOUS
Refills: 0 | Status: DISCONTINUED | OUTPATIENT
Start: 2022-07-25 | End: 2022-07-26

## 2022-07-11 NOTE — H&P PST ADULT - NSICDXPASTSURGICALHX_GEN_ALL_CORE_FT
PAST SURGICAL HISTORY:  H/O colonoscopy     History of lung surgery RLL wedge resection 4/13/22    Port-A-Cath in place     S/P endoscopy

## 2022-07-11 NOTE — H&P PST ADULT - PROBLEM SELECTOR PLAN 2
Assessment and Plan: Patient instructed to take amlodipine, valsartan on day of procedure, verbalized understanding.

## 2022-07-11 NOTE — H&P PST ADULT - NSICDXFAMILYHX_GEN_ALL_CORE_FT
· Does have hx of dementia, recent CVA, and now with acute illness  · Patient with difficult to interpret verbal responses  · Hx of left sided weakness d/t CVA  · Neuro checks q2h  · Delirium precautions; regulate sleep-wake cycle, environmental controls, daily in ICU FAMILY HISTORY:  Father  Still living? Unknown  FHx: hypertension, Age at diagnosis: Age Unknown

## 2022-07-11 NOTE — H&P PST ADULT - PROBLEM SELECTOR PLAN 1
Assessment and Plan: Patient scheduled for surgery on 7/25/22  Patient provided with verbal and written presurgical instructions; verbalized understanding  with teach back.    Patient instructed to take his omeprazole for GI prophylaxis; verbalized understanding.    Patient provided with Chlorhexidine wash, verbal and written instructions reviewed. Patient demonstrated understanding with teach back.   Patient instructed to obtain preop covid pcr, lab directory provided

## 2022-07-11 NOTE — H&P PST ADULT - HISTORY OF PRESENT ILLNESS
62 year old male with PMH of HTN, GERD, Anemia, malignant neoplasm of stomach presents for pre op evaluation prior to scheduled surgery- robotic possible open distal gastrectomy, EGD, patient reports unspecified anemia and heartburn, EGD revealed ulcerated mass, biopsy revealed stage 3 gastric cancer 2/24/22, upon workup noted to have a RLL nodule s/p wedge resection 4/13/22, denies weight loss, bloody bowel movements

## 2022-07-13 ENCOUNTER — NON-APPOINTMENT (OUTPATIENT)
Age: 62
End: 2022-07-13

## 2022-07-18 LAB — ACID FAST STN SPT: NORMAL

## 2022-07-20 ENCOUNTER — APPOINTMENT (OUTPATIENT)
Dept: SURGICAL ONCOLOGY | Facility: HOSPITAL | Age: 62
End: 2022-07-20

## 2022-07-22 LAB — SARS-COV-2 RNA SPEC QL NAA+PROBE: SIGNIFICANT CHANGE UP

## 2022-07-22 NOTE — ASU PATIENT PROFILE, ADULT - FALL HARM RISK - UNIVERSAL INTERVENTIONS
Bed in lowest position, wheels locked, appropriate side rails in place/Call bell, personal items and telephone in reach/Instruct patient to call for assistance before getting out of bed or chair/Non-slip footwear when patient is out of bed/[place] to call system/Physically safe environment - no spills, clutter or unnecessary equipment/Purposeful Proactive Rounding/Room/bathroom lighting operational, light cord in reach

## 2022-07-24 ENCOUNTER — TRANSCRIPTION ENCOUNTER (OUTPATIENT)
Age: 62
End: 2022-07-24

## 2022-07-25 ENCOUNTER — TRANSCRIPTION ENCOUNTER (OUTPATIENT)
Age: 62
End: 2022-07-25

## 2022-07-25 ENCOUNTER — APPOINTMENT (OUTPATIENT)
Dept: SURGICAL ONCOLOGY | Facility: HOSPITAL | Age: 62
End: 2022-07-25

## 2022-07-25 ENCOUNTER — INPATIENT (INPATIENT)
Facility: HOSPITAL | Age: 62
LOS: 2 days | Discharge: HOME | End: 2022-07-28
Attending: SURGERY | Admitting: SURGERY

## 2022-07-25 ENCOUNTER — RESULT REVIEW (OUTPATIENT)
Age: 62
End: 2022-07-25

## 2022-07-25 VITALS
TEMPERATURE: 98 F | SYSTOLIC BLOOD PRESSURE: 122 MMHG | OXYGEN SATURATION: 96 % | WEIGHT: 179.02 LBS | HEIGHT: 69.5 IN | RESPIRATION RATE: 18 BRPM | DIASTOLIC BLOOD PRESSURE: 91 MMHG

## 2022-07-25 DIAGNOSIS — C16.9 MALIGNANT NEOPLASM OF STOMACH, UNSPECIFIED: ICD-10-CM

## 2022-07-25 DIAGNOSIS — Z98.890 OTHER SPECIFIED POSTPROCEDURAL STATES: Chronic | ICD-10-CM

## 2022-07-25 DIAGNOSIS — Z95.828 PRESENCE OF OTHER VASCULAR IMPLANTS AND GRAFTS: Chronic | ICD-10-CM

## 2022-07-25 LAB — GLUCOSE BLDC GLUCOMTR-MCNC: 108 MG/DL — HIGH (ref 70–99)

## 2022-07-25 PROCEDURE — 88342 IMHCHEM/IMCYTCHM 1ST ANTB: CPT | Mod: 26,59

## 2022-07-25 PROCEDURE — 43632 REMOVAL OF STOMACH PARTIAL: CPT

## 2022-07-25 PROCEDURE — 88332 PATH CONSLTJ SURG EA ADD BLK: CPT | Mod: 26

## 2022-07-25 PROCEDURE — 88360 TUMOR IMMUNOHISTOCHEM/MANUAL: CPT | Mod: 26

## 2022-07-25 PROCEDURE — 49905 OMENTAL FLAP INTRA-ABDOM: CPT

## 2022-07-25 PROCEDURE — 88305 TISSUE EXAM BY PATHOLOGIST: CPT | Mod: 26

## 2022-07-25 PROCEDURE — 88331 PATH CONSLTJ SURG 1 BLK 1SPC: CPT | Mod: 26

## 2022-07-25 PROCEDURE — 88309 TISSUE EXAM BY PATHOLOGIST: CPT | Mod: 26

## 2022-07-25 PROCEDURE — 43500 SURGICAL OPENING OF STOMACH: CPT | Mod: 59

## 2022-07-25 PROCEDURE — S2900 ROBOTIC SURGICAL SYSTEM: CPT | Mod: NC

## 2022-07-25 DEVICE — LIGATING CLIPS WECK HEMOLOK POLYMER LARGE (PURPLE) 6: Type: IMPLANTABLE DEVICE | Status: FUNCTIONAL

## 2022-07-25 DEVICE — SURGICEL 2 X 14": Type: IMPLANTABLE DEVICE | Status: FUNCTIONAL

## 2022-07-25 DEVICE — STAPLER COVIDIEN TRI-STAPLE 60MM PURPLE RELOAD: Type: IMPLANTABLE DEVICE | Status: FUNCTIONAL

## 2022-07-25 RX ORDER — PANTOPRAZOLE SODIUM 20 MG/1
40 TABLET, DELAYED RELEASE ORAL DAILY
Refills: 0 | Status: DISCONTINUED | OUTPATIENT
Start: 2022-07-25 | End: 2022-07-28

## 2022-07-25 RX ORDER — SODIUM CHLORIDE 9 MG/ML
500 INJECTION, SOLUTION INTRAVENOUS ONCE
Refills: 0 | Status: COMPLETED | OUTPATIENT
Start: 2022-07-25 | End: 2022-07-25

## 2022-07-25 RX ORDER — HYDROMORPHONE HYDROCHLORIDE 2 MG/ML
0.5 INJECTION INTRAMUSCULAR; INTRAVENOUS; SUBCUTANEOUS EVERY 4 HOURS
Refills: 0 | Status: DISCONTINUED | OUTPATIENT
Start: 2022-07-25 | End: 2022-07-28

## 2022-07-25 RX ORDER — ACETAMINOPHEN 500 MG
1000 TABLET ORAL EVERY 6 HOURS
Refills: 0 | Status: COMPLETED | OUTPATIENT
Start: 2022-07-25 | End: 2022-07-26

## 2022-07-25 RX ORDER — HEPARIN SODIUM 5000 [USP'U]/ML
5000 INJECTION INTRAVENOUS; SUBCUTANEOUS EVERY 8 HOURS
Refills: 0 | Status: DISCONTINUED | OUTPATIENT
Start: 2022-07-25 | End: 2022-07-28

## 2022-07-25 RX ORDER — SODIUM CHLORIDE 9 MG/ML
1000 INJECTION, SOLUTION INTRAVENOUS
Refills: 0 | Status: DISCONTINUED | OUTPATIENT
Start: 2022-07-25 | End: 2022-07-25

## 2022-07-25 RX ADMIN — SODIUM CHLORIDE 1000 MILLILITER(S): 9 INJECTION, SOLUTION INTRAVENOUS at 14:12

## 2022-07-25 RX ADMIN — PANTOPRAZOLE SODIUM 40 MILLIGRAM(S): 20 TABLET, DELAYED RELEASE ORAL at 22:05

## 2022-07-25 RX ADMIN — HYDROMORPHONE HYDROCHLORIDE 0.5 MILLIGRAM(S): 2 INJECTION INTRAMUSCULAR; INTRAVENOUS; SUBCUTANEOUS at 22:05

## 2022-07-25 RX ADMIN — HEPARIN SODIUM 5000 UNIT(S): 5000 INJECTION INTRAVENOUS; SUBCUTANEOUS at 16:12

## 2022-07-25 RX ADMIN — SODIUM CHLORIDE 3 MILLILITER(S): 9 INJECTION INTRAMUSCULAR; INTRAVENOUS; SUBCUTANEOUS at 22:31

## 2022-07-25 NOTE — PACU DISCHARGE NOTE - COMMENTS
T(C): 36.2 (07-25-22 @ 15:00), Max: 36.8 (07-25-22 @ 07:03)  HR: 71 (07-25-22 @ 15:00) (69 - 77)  BP: 131/76 (07-25-22 @ 15:00) (122/91 - 138/92)  RR: 12 (07-25-22 @ 15:00) (11 - 19)  SpO2: 96% (07-25-22 @ 15:00) (96% - 100%)    Vital signs stable, non-labored breathing with adequate oxygen saturation. Pain and nausea are controlled. All questions answered. Stable for PACU discharge.

## 2022-07-25 NOTE — ASU PREOP CHECKLIST - LAST TOOK
----- Message from Bryson Giles sent at 8/31/2017  4:32 PM EDT -----  Regarding: DrJoan Bragg  Pt states while she was out of town she contracted Shingles on her face and eye. Seen in Urgent care while out of town. They also spoke with Dr. Toñito Smith on call. Been seen by Optomologist. Best contact number [phone]. clears

## 2022-07-25 NOTE — BRIEF OPERATIVE NOTE - OPERATION/FINDINGS
Robotic distal gastrectomy, antecolic Billroth 2 gastrojejunostomy, D1D2 lymphadenectomy    frozen section on distal margin negative

## 2022-07-25 NOTE — PATIENT PROFILE ADULT - FALL HARM RISK - HARM RISK INTERVENTIONS
Assistance with ambulation/Assistance OOB with selected safe patient handling equipment/Communicate Risk of Fall with Harm to all staff/Monitor gait and stability/Reinforce activity limits and safety measures with patient and family/Sit up slowly, dangle for a short time, stand at bedside before walking/Tailored Fall Risk Interventions/Visual Cue: Yellow wristband and red socks/Bed in lowest position, wheels locked, appropriate side rails in place/Call bell, personal items and telephone in reach/Instruct patient to call for assistance before getting out of bed or chair/Non-slip footwear when patient is out of bed/[place] to call system/Physically safe environment - no spills, clutter or unnecessary equipment/Purposeful Proactive Rounding/Room/bathroom lighting operational, light cord in reach

## 2022-07-25 NOTE — PACU DISCHARGE NOTE - NSPTMEETSDISCHCRITERIADT_GEN_A_CORE
Pt called and would like an order for supplies for cpap orders pended please advise.  April St Alondra SUAZO 12/27/2017 11:22 AM    
25-Jul-2022 15:26

## 2022-07-25 NOTE — ASU PREOP CHECKLIST - BOWEL PREP
Report received from SHANNAN Stroud. No acute distress at present. Pt. is sleeping at present. Respirations are even and unlabored on room air. Will continue to monitor. Report received from SHANNAN Stroud. No acute distress at present. Pt. is A&O x1, not oriented to time or place. At present, pt. is minimally verbal and calm. Respirations are even and unlabored on room air. Will continue to monitor. n/a

## 2022-07-26 ENCOUNTER — TRANSCRIPTION ENCOUNTER (OUTPATIENT)
Age: 62
End: 2022-07-26

## 2022-07-26 LAB
ANION GAP SERPL CALC-SCNC: 10 MMOL/L — SIGNIFICANT CHANGE UP (ref 7–14)
BASOPHILS # BLD AUTO: 0.03 K/UL — SIGNIFICANT CHANGE UP (ref 0–0.2)
BASOPHILS NFR BLD AUTO: 0.3 % — SIGNIFICANT CHANGE UP (ref 0–2)
BUN SERPL-MCNC: 18 MG/DL — SIGNIFICANT CHANGE UP (ref 7–23)
CALCIUM SERPL-MCNC: 8.9 MG/DL — SIGNIFICANT CHANGE UP (ref 8.4–10.5)
CHLORIDE SERPL-SCNC: 100 MMOL/L — SIGNIFICANT CHANGE UP (ref 98–107)
CO2 SERPL-SCNC: 26 MMOL/L — SIGNIFICANT CHANGE UP (ref 22–31)
CREAT SERPL-MCNC: 1.1 MG/DL — SIGNIFICANT CHANGE UP (ref 0.5–1.3)
EGFR: 76 ML/MIN/1.73M2 — SIGNIFICANT CHANGE UP
EOSINOPHIL # BLD AUTO: 0 K/UL — SIGNIFICANT CHANGE UP (ref 0–0.5)
EOSINOPHIL NFR BLD AUTO: 0 % — SIGNIFICANT CHANGE UP (ref 0–6)
GLUCOSE SERPL-MCNC: 106 MG/DL — HIGH (ref 70–99)
HCT VFR BLD CALC: 35.1 % — LOW (ref 39–50)
HGB BLD-MCNC: 11.1 G/DL — LOW (ref 13–17)
IANC: 8.16 K/UL — HIGH (ref 1.8–7.4)
IMM GRANULOCYTES NFR BLD AUTO: 0.4 % — SIGNIFICANT CHANGE UP (ref 0–1.5)
LYMPHOCYTES # BLD AUTO: 1.31 K/UL — SIGNIFICANT CHANGE UP (ref 1–3.3)
LYMPHOCYTES # BLD AUTO: 12.5 % — LOW (ref 13–44)
MAGNESIUM SERPL-MCNC: 1.9 MG/DL — SIGNIFICANT CHANGE UP (ref 1.6–2.6)
MCHC RBC-ENTMCNC: 29.4 PG — SIGNIFICANT CHANGE UP (ref 27–34)
MCHC RBC-ENTMCNC: 31.6 GM/DL — LOW (ref 32–36)
MCV RBC AUTO: 92.9 FL — SIGNIFICANT CHANGE UP (ref 80–100)
MONOCYTES # BLD AUTO: 0.94 K/UL — HIGH (ref 0–0.9)
MONOCYTES NFR BLD AUTO: 9 % — SIGNIFICANT CHANGE UP (ref 2–14)
NEUTROPHILS # BLD AUTO: 8.16 K/UL — HIGH (ref 1.8–7.4)
NEUTROPHILS NFR BLD AUTO: 77.8 % — HIGH (ref 43–77)
NRBC # BLD: 0 /100 WBCS — SIGNIFICANT CHANGE UP
NRBC # FLD: 0 K/UL — SIGNIFICANT CHANGE UP
PHOSPHATE SERPL-MCNC: 4.1 MG/DL — SIGNIFICANT CHANGE UP (ref 2.5–4.5)
PLATELET # BLD AUTO: 220 K/UL — SIGNIFICANT CHANGE UP (ref 150–400)
POTASSIUM SERPL-MCNC: 3.9 MMOL/L — SIGNIFICANT CHANGE UP (ref 3.5–5.3)
POTASSIUM SERPL-SCNC: 3.9 MMOL/L — SIGNIFICANT CHANGE UP (ref 3.5–5.3)
RBC # BLD: 3.78 M/UL — LOW (ref 4.2–5.8)
RBC # FLD: 15.2 % — HIGH (ref 10.3–14.5)
SODIUM SERPL-SCNC: 136 MMOL/L — SIGNIFICANT CHANGE UP (ref 135–145)
WBC # BLD: 10.48 K/UL — SIGNIFICANT CHANGE UP (ref 3.8–10.5)
WBC # FLD AUTO: 10.48 K/UL — SIGNIFICANT CHANGE UP (ref 3.8–10.5)

## 2022-07-26 RX ORDER — SODIUM CHLORIDE 9 MG/ML
1000 INJECTION, SOLUTION INTRAVENOUS
Refills: 0 | Status: DISCONTINUED | OUTPATIENT
Start: 2022-07-26 | End: 2022-07-28

## 2022-07-26 RX ADMIN — HEPARIN SODIUM 5000 UNIT(S): 5000 INJECTION INTRAVENOUS; SUBCUTANEOUS at 16:25

## 2022-07-26 RX ADMIN — Medication 400 MILLIGRAM(S): at 05:29

## 2022-07-26 RX ADMIN — PANTOPRAZOLE SODIUM 40 MILLIGRAM(S): 20 TABLET, DELAYED RELEASE ORAL at 11:46

## 2022-07-26 RX ADMIN — HYDROMORPHONE HYDROCHLORIDE 0.5 MILLIGRAM(S): 2 INJECTION INTRAMUSCULAR; INTRAVENOUS; SUBCUTANEOUS at 20:19

## 2022-07-26 RX ADMIN — HEPARIN SODIUM 5000 UNIT(S): 5000 INJECTION INTRAVENOUS; SUBCUTANEOUS at 08:51

## 2022-07-26 RX ADMIN — Medication 400 MILLIGRAM(S): at 11:46

## 2022-07-26 RX ADMIN — Medication 1000 MILLIGRAM(S): at 06:12

## 2022-07-26 RX ADMIN — HEPARIN SODIUM 5000 UNIT(S): 5000 INJECTION INTRAVENOUS; SUBCUTANEOUS at 00:14

## 2022-07-26 RX ADMIN — SODIUM CHLORIDE 84 MILLILITER(S): 9 INJECTION, SOLUTION INTRAVENOUS at 08:47

## 2022-07-26 RX ADMIN — SODIUM CHLORIDE 3 MILLILITER(S): 9 INJECTION INTRAMUSCULAR; INTRAVENOUS; SUBCUTANEOUS at 06:20

## 2022-07-26 RX ADMIN — Medication 1000 MILLIGRAM(S): at 11:46

## 2022-07-26 RX ADMIN — HEPARIN SODIUM 5000 UNIT(S): 5000 INJECTION INTRAVENOUS; SUBCUTANEOUS at 23:27

## 2022-07-26 RX ADMIN — SODIUM CHLORIDE 3 MILLILITER(S): 9 INJECTION INTRAMUSCULAR; INTRAVENOUS; SUBCUTANEOUS at 12:18

## 2022-07-26 RX ADMIN — SODIUM CHLORIDE 3 MILLILITER(S): 9 INJECTION INTRAMUSCULAR; INTRAVENOUS; SUBCUTANEOUS at 21:03

## 2022-07-26 RX ADMIN — SODIUM CHLORIDE 42 MILLILITER(S): 9 INJECTION, SOLUTION INTRAVENOUS at 23:30

## 2022-07-26 NOTE — PROGRESS NOTE ADULT - ASSESSMENT
Assessment:  62y M patient S/P robotic assisted distal gastrectomy with Billroth II GJ. Patient is feeling well without pain, nausea or vomiting.     Plan:  - Pain control PRN  - Diet: NPO  - UGI tomorrow   - IVF  - d/c SY[facility] Branch  - Activity: OOB/ambulate as tolerated Assessment:  62y M patient S/P robotic assisted distal gastrectomy with Billroth II GJ. Patient is feeling well without pain, nausea or vomiting.     Plan:  - Discontinue connor today and TOV   - Encourage ambulating  - Pain control PRN  - Diet: NPO  - UGI tomorrow   - IVF  - Activity: OOB/ambulate as tolerated

## 2022-07-26 NOTE — DISCHARGE NOTE PROVIDER - CARE PROVIDER_API CALL
Jose Luis Ramirez)  Surgery  [address] [place] [address]  Phone: [phone]  Fax: [phone]  Follow Up Time: 1 week

## 2022-07-26 NOTE — PROGRESS NOTE ADULT - ASSESSMENT
Patient is a 61 Y/O Male S/P robotic assisted distal gastrectomy with Billroth II GJ. Patient is feeling well without pain, nausea or vomiting.       # S/P Distal gastrectomy  Care per surg appreciated  IV Hydration  on clears, monitor and advance as toelrated  pain control  replete electrolytes  OOB to chair     # HTN  Monitor vitals  BP control  PRN     DVT and gi PPX       Discussed with patient and wife at the bedside

## 2022-07-26 NOTE — DISCHARGE NOTE PROVIDER - HOSPITAL COURSE
This patient is a 62 year old male with PMH of HTN, GERD, Anemia, malignant neoplasm of stomach who presented to [facility] on 7/25/22 for scheduled surgery- robotic possible open distal gastrectomy, EGD. Patient taken to OR by Dr. Ramirez and underwent robotic distal gastrectomy, antecolic Billroth 2 gastrojejunostomy, D1D2 lymphadenectomy. Patient tolerated operation well and there were no post-operative complications identified. Patient remained hemodynamically stable in the PACU and transferred to the surgical floor. Diet was restarted and advanced as tolerated. Pain control was transitioned from IV to PO pain meds. At this time, patient is currently ambulating, voiding, tolerating a regular diet. Pain well controlled on PO pain meds. Patient seen by physical therapy throughout hospital stay who recommended patient be discharged home without skilled PT needs. Patient has been deemed stable for discharge home with follow up as an outpatient.        This patient is a 62 year old male with PMH of HTN, GERD, Anemia, malignant neoplasm of stomach who presented to [facility] on 7/25/22 for scheduled surgery- robotic possible open distal gastrectomy, EGD. Patient taken to OR by Dr. Ramirez and underwent robotic distal gastrectomy, antecolic Billroth 2 gastrojejunostomy, D1D2 lymphadenectomy. Patient tolerated operation well and there were no post-operative complications identified. Patient remained hemodynamically stable in the PACU and transferred to the surgical floor. Diet was restarted and advanced as tolerated. Pain control was transitioned from IV to PO pain meds. At this time, patient is currently ambulating, voiding, tolerating a regular diet. Pain well controlled on PO pain meds. Patient seen by physical therapy throughout hospital stay who recommended patient be discharged home without skilled PT needs. Patient has been deemed stable for discharge home with follow up as an outpatient.     ISTOP Reference #: [id number]  4/15 Oxycodone HCl 5 mg 20 tablets for 5 days

## 2022-07-26 NOTE — PHYSICAL THERAPY INITIAL EVALUATION ADULT - ADDITIONAL COMMENTS
Pt lives in a two-story house with his wife; there are 2 steps to enter and resides on first floor. Oculoplastic Surgeon Procedure Text (B): After obtaining clear surgical margins the patient was sent to oculoplastics for surgical repair.  The patient understands they will receive post-surgical care and follow-up from the referring physician's office.

## 2022-07-26 NOTE — DISCHARGE NOTE PROVIDER - NSDCMRMEDTOKEN_GEN_ALL_CORE_FT
amLODIPine 2.5 mg oral tablet: 1 tab(s) orally once a day  omeprazole 40 mg oral delayed release capsule: 1 cap(s) orally once a day  valsartan 160 mg oral tablet: 1 tab(s) orally once a day   acetaminophen 500 mg oral tablet: 2 tab(s) orally every 6 hours  amLODIPine 2.5 mg oral tablet: 1 tab(s) orally once a day  Carafate 1 g/10 mL oral suspension: 10 milliliter(s) orally every 6 hours   oxyCODONE 5 mg oral tablet: 1 tab(s) orally every 6 hours, As Needed -Moderate Pain (4 - 6) MDD:20 mg  pantoprazole 40 mg oral delayed release tablet: 1 tab(s) orally once a day (before a meal)  valsartan 160 mg oral tablet: 1 tab(s) orally once a day

## 2022-07-26 NOTE — DISCHARGE NOTE PROVIDER - NSDCFUSCHEDAPPT_GEN_ALL_CORE_FT
Jose Luis Ramirez  [facility]  SURGONC [address] [facility]  Scheduled Appointment: 08/11/2022

## 2022-07-26 NOTE — DISCHARGE NOTE PROVIDER - NSDCCPTREATMENT_GEN_ALL_CORE_FT
PRINCIPAL PROCEDURE  Procedure: Gastrectomy, distal, robot-assisted, laparoscopic, with gastrojejunostomy creation  Findings and Treatment:

## 2022-07-26 NOTE — DISCHARGE NOTE PROVIDER - NSDCCPCAREPLAN_GEN_ALL_CORE_FT
PRINCIPAL DISCHARGE DIAGNOSIS  Diagnosis: Adenocarcinoma of stomach  Assessment and Plan of Treatment: *****WOUND CARE:  Please keep incisions clean and dry. Please do not Scrub or rub incisions. Do not use lotion or powder on incisions.   BATHING: You may shower and/or sponge bathe. You may use warm soapy water in the shower and rinse, pat dry.  ACTIVITY: No heavy lifting or straining. Otherwise, you may return to your usual level of physical activity. If you are taking narcotic pain medication DO NOT drive a car, operate machinery or make important decisions.  DIET: *******  NOTIFY YOUR SURGEON IF YOU HAVE: any bleeding that does not stop, any pus draining from your wound(s), any fever (over 100.4 F) persistent nausea/vomiting, or if your pain is not controlled on your discharge pain medications, unable to urinate.  FOLLOW UP:  1. Please follow up with your primary care physician in one week regarding your hospitalization, bring copies of your discharge paperwork.  2. Please follow up with your surgeon, Dr. Ramirez. Call [phone] to make an appointment.       PRINCIPAL DISCHARGE DIAGNOSIS  Diagnosis: Adenocarcinoma of stomach  Assessment and Plan of Treatment: WOUND CARE:  Please keep incisions clean and dry. Please do not Scrub or rub incisions. Do not use lotion or powder on incisions.   BATHING: You may shower and/or sponge bathe. You may use warm soapy water in the shower and rinse, pat dry.  ACTIVITY: No heavy lifting or straining. Otherwise, you may return to your usual level of physical activity. If you are taking narcotic pain medication DO NOT drive a car, operate machinery or make important decisions.  DIET: Please consume soft bite sized food/diet.  NOTIFY YOUR SURGEON IF YOU HAVE: any bleeding that does not stop, any pus draining from your wound(s), any fever (over 100.4 F) persistent nausea/vomiting, or if your pain is not controlled on your discharge pain medications, unable to urinate.  FOLLOW UP:  1. Please follow up with your primary care physician in one week regarding your hospitalization, bring copies of your discharge paperwork.  2. Please follow up with your surgeon, Dr. Ramirez. Call [phone] to make an appointment.       PRINCIPAL DISCHARGE DIAGNOSIS  Diagnosis: Adenocarcinoma of stomach  Assessment and Plan of Treatment: WOUND CARE:  Please keep incisions clean and dry. Please do not Scrub or rub incisions. Do not use lotion or powder on incisions.   BATHING: You may shower and/or sponge bathe. You may use warm soapy water in the shower and rinse, pat dry.  ACTIVITY: No heavy lifting or straining. Otherwise, you may return to your usual level of physical activity. If you are taking narcotic pain medication DO NOT drive a car, operate machinery or make important decisions.  DIET: Please consume regular diet as tolerated.  NOTIFY YOUR SURGEON IF YOU HAVE: any bleeding that does not stop, any pus draining from your wound(s), any fever (over 100.4 F) persistent nausea/vomiting, or if your pain is not controlled on your discharge pain medications, unable to urinate.  FOLLOW UP:  1. Please follow up with your primary care physician in one week regarding your hospitalization, bring copies of your discharge paperwork.  2. Please follow up with your surgeon, Dr. Ramirez. Call [phone] to make an appointment.

## 2022-07-27 ENCOUNTER — TRANSCRIPTION ENCOUNTER (OUTPATIENT)
Age: 62
End: 2022-07-27

## 2022-07-27 LAB
ANION GAP SERPL CALC-SCNC: 12 MMOL/L — SIGNIFICANT CHANGE UP (ref 7–14)
BUN SERPL-MCNC: 11 MG/DL — SIGNIFICANT CHANGE UP (ref 7–23)
CALCIUM SERPL-MCNC: 9.1 MG/DL — SIGNIFICANT CHANGE UP (ref 8.4–10.5)
CHLORIDE SERPL-SCNC: 99 MMOL/L — SIGNIFICANT CHANGE UP (ref 98–107)
CO2 SERPL-SCNC: 25 MMOL/L — SIGNIFICANT CHANGE UP (ref 22–31)
CREAT SERPL-MCNC: 1.08 MG/DL — SIGNIFICANT CHANGE UP (ref 0.5–1.3)
EGFR: 78 ML/MIN/1.73M2 — SIGNIFICANT CHANGE UP
GLUCOSE SERPL-MCNC: 139 MG/DL — HIGH (ref 70–99)
HCT VFR BLD CALC: 35.7 % — LOW (ref 39–50)
HGB BLD-MCNC: 11.2 G/DL — LOW (ref 13–17)
MAGNESIUM SERPL-MCNC: 1.8 MG/DL — SIGNIFICANT CHANGE UP (ref 1.6–2.6)
MCHC RBC-ENTMCNC: 29.9 PG — SIGNIFICANT CHANGE UP (ref 27–34)
MCHC RBC-ENTMCNC: 31.4 GM/DL — LOW (ref 32–36)
MCV RBC AUTO: 95.2 FL — SIGNIFICANT CHANGE UP (ref 80–100)
NRBC # BLD: 0 /100 WBCS — SIGNIFICANT CHANGE UP
NRBC # FLD: 0 K/UL — SIGNIFICANT CHANGE UP
PHOSPHATE SERPL-MCNC: 3 MG/DL — SIGNIFICANT CHANGE UP (ref 2.5–4.5)
PLATELET # BLD AUTO: 202 K/UL — SIGNIFICANT CHANGE UP (ref 150–400)
POTASSIUM SERPL-MCNC: 3.9 MMOL/L — SIGNIFICANT CHANGE UP (ref 3.5–5.3)
POTASSIUM SERPL-SCNC: 3.9 MMOL/L — SIGNIFICANT CHANGE UP (ref 3.5–5.3)
RBC # BLD: 3.75 M/UL — LOW (ref 4.2–5.8)
RBC # FLD: 14.9 % — HIGH (ref 10.3–14.5)
SODIUM SERPL-SCNC: 136 MMOL/L — SIGNIFICANT CHANGE UP (ref 135–145)
WBC # BLD: 10.89 K/UL — HIGH (ref 3.8–10.5)
WBC # FLD AUTO: 10.89 K/UL — HIGH (ref 3.8–10.5)

## 2022-07-27 PROCEDURE — 74018 RADEX ABDOMEN 1 VIEW: CPT | Mod: 26,59

## 2022-07-27 PROCEDURE — 74240 X-RAY XM UPR GI TRC 1CNTRST: CPT | Mod: 26

## 2022-07-27 PROCEDURE — 71045 X-RAY EXAM CHEST 1 VIEW: CPT | Mod: 26

## 2022-07-27 RX ORDER — MAGNESIUM SULFATE 500 MG/ML
1 VIAL (ML) INJECTION ONCE
Refills: 0 | Status: COMPLETED | OUTPATIENT
Start: 2022-07-27 | End: 2022-07-27

## 2022-07-27 RX ORDER — ACETAMINOPHEN 500 MG
1000 TABLET ORAL EVERY 6 HOURS
Refills: 0 | Status: COMPLETED | OUTPATIENT
Start: 2022-07-27 | End: 2022-07-28

## 2022-07-27 RX ADMIN — Medication 400 MILLIGRAM(S): at 17:05

## 2022-07-27 RX ADMIN — SODIUM CHLORIDE 3 MILLILITER(S): 9 INJECTION INTRAMUSCULAR; INTRAVENOUS; SUBCUTANEOUS at 13:00

## 2022-07-27 RX ADMIN — HEPARIN SODIUM 5000 UNIT(S): 5000 INJECTION INTRAVENOUS; SUBCUTANEOUS at 17:06

## 2022-07-27 RX ADMIN — SODIUM CHLORIDE 42 MILLILITER(S): 9 INJECTION, SOLUTION INTRAVENOUS at 09:09

## 2022-07-27 RX ADMIN — SODIUM CHLORIDE 3 MILLILITER(S): 9 INJECTION INTRAMUSCULAR; INTRAVENOUS; SUBCUTANEOUS at 22:29

## 2022-07-27 RX ADMIN — Medication 100 GRAM(S): at 12:21

## 2022-07-27 RX ADMIN — Medication 400 MILLIGRAM(S): at 12:21

## 2022-07-27 RX ADMIN — HEPARIN SODIUM 5000 UNIT(S): 5000 INJECTION INTRAVENOUS; SUBCUTANEOUS at 09:06

## 2022-07-27 RX ADMIN — Medication 1000 MILLIGRAM(S): at 17:35

## 2022-07-27 RX ADMIN — PANTOPRAZOLE SODIUM 40 MILLIGRAM(S): 20 TABLET, DELAYED RELEASE ORAL at 12:23

## 2022-07-27 RX ADMIN — Medication 1000 MILLIGRAM(S): at 12:51

## 2022-07-27 RX ADMIN — SODIUM CHLORIDE 3 MILLILITER(S): 9 INJECTION INTRAMUSCULAR; INTRAVENOUS; SUBCUTANEOUS at 05:50

## 2022-07-27 NOTE — PROGRESS NOTE ADULT - ASSESSMENT
Patient is a 61 Y/O Male S/P robotic assisted distal gastrectomy with Billroth II GJ. Patient is feeling well without pain, nausea or vomiting.       # S/P Distal gastrectomy  Care per surg appreciated  IV Hydration  on clears, monitor and advance as toelrated  pain control  replete electrolytes  OOB to chair     # HTN  Monitor vitals  BP control  PRN     DVT and gi PPX

## 2022-07-27 NOTE — DISCHARGE NOTE NURSING/CASE MANAGEMENT/SOCIAL WORK - NSDCPNINST_GEN_ALL_CORE
Reviewed discharge instructions with patient and spouse. Instructed patient to take all medications as prescribed, to follow up with healthcare provider and to call 911 if patient experiences chest pain, shortness of breath, difficulty breathing or fever.

## 2022-07-27 NOTE — DISCHARGE NOTE NURSING/CASE MANAGEMENT/SOCIAL WORK - NSDCPEFALRISK_GEN_ALL_CORE
For information on Fall & Injury Prevention, visit: [email or web address] OR  [email or web address] OR  https://www.cdc.gov/steadi/patient.html

## 2022-07-27 NOTE — PROGRESS NOTE ADULT - ASSESSMENT
----- Message from Reinier Monsalve sent at 8/23/2017  1:43 PM CDT -----  Patient states that (s)he needs to speak with nurse in ref to scheduling an appt if he needs to be seen;pt states that this is his second time calling//please call back at [phone] Ext [phone]//thank you   Assessment:  62y M patient S/P robotic assisted distal gastrectomy with Billroth II GJ. Patient is feeling well without pain, nausea or vomiting.     Plan:  - UGI today and if no evidence of leak will advance patient to FLD.   - Encourage ambulating  - Pain control PRN  - Diet: CLD  - IVF  - Activity: OOB/ambulate as tolerated

## 2022-07-27 NOTE — DISCHARGE NOTE NURSING/CASE MANAGEMENT/SOCIAL WORK - PATIENT PORTAL LINK FT
You can access the FollowMyHealth Patient Portal offered by [facility] by registering at the following website: [email or web address]. By joining [facility]’s FollowMyHealth portal, you will also be able to view your health information using other applications (apps) compatible with our system.

## 2022-07-28 VITALS
DIASTOLIC BLOOD PRESSURE: 94 MMHG | TEMPERATURE: 98 F | HEART RATE: 76 BPM | RESPIRATION RATE: 18 BRPM | SYSTOLIC BLOOD PRESSURE: 147 MMHG | OXYGEN SATURATION: 97 %

## 2022-07-28 LAB
ANION GAP SERPL CALC-SCNC: 10 MMOL/L — SIGNIFICANT CHANGE UP (ref 7–14)
BUN SERPL-MCNC: 9 MG/DL — SIGNIFICANT CHANGE UP (ref 7–23)
CALCIUM SERPL-MCNC: 9 MG/DL — SIGNIFICANT CHANGE UP (ref 8.4–10.5)
CHLORIDE SERPL-SCNC: 102 MMOL/L — SIGNIFICANT CHANGE UP (ref 98–107)
CO2 SERPL-SCNC: 27 MMOL/L — SIGNIFICANT CHANGE UP (ref 22–31)
CREAT SERPL-MCNC: 0.99 MG/DL — SIGNIFICANT CHANGE UP (ref 0.5–1.3)
EGFR: 86 ML/MIN/1.73M2 — SIGNIFICANT CHANGE UP
GLUCOSE SERPL-MCNC: 112 MG/DL — HIGH (ref 70–99)
HCT VFR BLD CALC: 34.6 % — LOW (ref 39–50)
HGB BLD-MCNC: 11.1 G/DL — LOW (ref 13–17)
MAGNESIUM SERPL-MCNC: 2.1 MG/DL — SIGNIFICANT CHANGE UP (ref 1.6–2.6)
MCHC RBC-ENTMCNC: 29.8 PG — SIGNIFICANT CHANGE UP (ref 27–34)
MCHC RBC-ENTMCNC: 32.1 GM/DL — SIGNIFICANT CHANGE UP (ref 32–36)
MCV RBC AUTO: 92.8 FL — SIGNIFICANT CHANGE UP (ref 80–100)
NRBC # BLD: 0 /100 WBCS — SIGNIFICANT CHANGE UP
NRBC # FLD: 0 K/UL — SIGNIFICANT CHANGE UP
PHOSPHATE SERPL-MCNC: 3.6 MG/DL — SIGNIFICANT CHANGE UP (ref 2.5–4.5)
PLATELET # BLD AUTO: 218 K/UL — SIGNIFICANT CHANGE UP (ref 150–400)
POTASSIUM SERPL-MCNC: 3.3 MMOL/L — LOW (ref 3.5–5.3)
POTASSIUM SERPL-SCNC: 3.3 MMOL/L — LOW (ref 3.5–5.3)
RBC # BLD: 3.73 M/UL — LOW (ref 4.2–5.8)
RBC # FLD: 14.9 % — HIGH (ref 10.3–14.5)
SODIUM SERPL-SCNC: 139 MMOL/L — SIGNIFICANT CHANGE UP (ref 135–145)
WBC # BLD: 10.22 K/UL — SIGNIFICANT CHANGE UP (ref 3.8–10.5)
WBC # FLD AUTO: 10.22 K/UL — SIGNIFICANT CHANGE UP (ref 3.8–10.5)

## 2022-07-28 RX ORDER — OXYCODONE HYDROCHLORIDE 5 MG/1
5 TABLET ORAL EVERY 4 HOURS
Refills: 0 | Status: DISCONTINUED | OUTPATIENT
Start: 2022-07-28 | End: 2022-07-28

## 2022-07-28 RX ORDER — OXYCODONE HYDROCHLORIDE 5 MG/1
1 TABLET ORAL
Qty: 12 | Refills: 0
Start: 2022-07-28 | End: 2022-07-30

## 2022-07-28 RX ORDER — AMLODIPINE BESYLATE 2.5 MG/1
2.5 TABLET ORAL DAILY
Refills: 0 | Status: DISCONTINUED | OUTPATIENT
Start: 2022-07-28 | End: 2022-07-28

## 2022-07-28 RX ORDER — PANTOPRAZOLE SODIUM 20 MG/1
1 TABLET, DELAYED RELEASE ORAL
Qty: 30 | Refills: 0
Start: 2022-07-28 | End: 2022-08-26

## 2022-07-28 RX ORDER — VALSARTAN 80 MG/1
160 TABLET ORAL DAILY
Refills: 0 | Status: DISCONTINUED | OUTPATIENT
Start: 2022-07-28 | End: 2022-07-28

## 2022-07-28 RX ORDER — SUCRALFATE 1 G
1 TABLET ORAL
Qty: 120 | Refills: 0
Start: 2022-07-28 | End: 2022-08-26

## 2022-07-28 RX ORDER — SUCRALFATE 1 G
1 TABLET ORAL EVERY 6 HOURS
Refills: 0 | Status: DISCONTINUED | OUTPATIENT
Start: 2022-07-28 | End: 2022-07-28

## 2022-07-28 RX ORDER — ACETAMINOPHEN 500 MG
2 TABLET ORAL
Qty: 0 | Refills: 0 | DISCHARGE
Start: 2022-07-28

## 2022-07-28 RX ORDER — ACETAMINOPHEN 500 MG
1000 TABLET ORAL EVERY 6 HOURS
Refills: 0 | Status: DISCONTINUED | OUTPATIENT
Start: 2022-07-28 | End: 2022-07-28

## 2022-07-28 RX ORDER — OMEPRAZOLE 10 MG/1
1 CAPSULE, DELAYED RELEASE ORAL
Qty: 0 | Refills: 0 | DISCHARGE

## 2022-07-28 RX ORDER — OXYCODONE HYDROCHLORIDE 5 MG/1
10 TABLET ORAL EVERY 4 HOURS
Refills: 0 | Status: DISCONTINUED | OUTPATIENT
Start: 2022-07-28 | End: 2022-07-28

## 2022-07-28 RX ORDER — PANTOPRAZOLE SODIUM 20 MG/1
40 TABLET, DELAYED RELEASE ORAL
Refills: 0 | Status: DISCONTINUED | OUTPATIENT
Start: 2022-07-28 | End: 2022-07-28

## 2022-07-28 RX ORDER — SUCRALFATE 1 G
10 TABLET ORAL
Qty: 1200 | Refills: 0
Start: 2022-07-28 | End: 2022-08-26

## 2022-07-28 RX ORDER — POTASSIUM CHLORIDE 20 MEQ
40 PACKET (EA) ORAL ONCE
Refills: 0 | Status: COMPLETED | OUTPATIENT
Start: 2022-07-28 | End: 2022-07-28

## 2022-07-28 RX ADMIN — HEPARIN SODIUM 5000 UNIT(S): 5000 INJECTION INTRAVENOUS; SUBCUTANEOUS at 07:44

## 2022-07-28 RX ADMIN — PANTOPRAZOLE SODIUM 40 MILLIGRAM(S): 20 TABLET, DELAYED RELEASE ORAL at 08:42

## 2022-07-28 RX ADMIN — SODIUM CHLORIDE 3 MILLILITER(S): 9 INJECTION INTRAMUSCULAR; INTRAVENOUS; SUBCUTANEOUS at 13:08

## 2022-07-28 RX ADMIN — Medication 1 GRAM(S): at 11:26

## 2022-07-28 RX ADMIN — SODIUM CHLORIDE 42 MILLILITER(S): 9 INJECTION, SOLUTION INTRAVENOUS at 00:10

## 2022-07-28 RX ADMIN — Medication 400 MILLIGRAM(S): at 05:54

## 2022-07-28 RX ADMIN — Medication 1000 MILLIGRAM(S): at 12:26

## 2022-07-28 RX ADMIN — HEPARIN SODIUM 5000 UNIT(S): 5000 INJECTION INTRAVENOUS; SUBCUTANEOUS at 00:13

## 2022-07-28 RX ADMIN — Medication 40 MILLIEQUIVALENT(S): at 13:17

## 2022-07-28 RX ADMIN — Medication 1000 MILLIGRAM(S): at 01:27

## 2022-07-28 RX ADMIN — Medication 400 MILLIGRAM(S): at 00:13

## 2022-07-28 RX ADMIN — SODIUM CHLORIDE 3 MILLILITER(S): 9 INJECTION INTRAMUSCULAR; INTRAVENOUS; SUBCUTANEOUS at 05:48

## 2022-07-28 RX ADMIN — Medication 1000 MILLIGRAM(S): at 06:56

## 2022-07-28 RX ADMIN — Medication 1000 MILLIGRAM(S): at 11:26

## 2022-07-28 NOTE — PROGRESS NOTE ADULT - ASSESSMENT
62y M patient S/P robotic assisted distal gastrectomy with Billroth II GJ. Patient is feeling well without pain, nausea or vomiting.     Plan:  - passed UGI without anastomotic leak  - Encourage ambulating  - Pain control PRN  - Diet: advance to regulars  - IVF  - Activity: OOB/ambulate as tolerated 62y M patient S/P robotic assisted distal gastrectomy with Billroth II GJ. Patient is feeling well without pain, nausea or vomiting.     Plan:  - passed UGI without anastomotic leak  - Encourage ambulating  - Pain control PRN  - Diet: advance to regulars  - IVF  - d/c home carafate, protonix  - Activity: OOB/ambulate as tolerated

## 2022-07-28 NOTE — PROGRESS NOTE ADULT - ASSESSMENT
Patient is a 63 Y/O Male S/P robotic assisted distal gastrectomy with Billroth II GJ. Patient is feeling well without pain, nausea or vomiting.       # S/P Distal gastrectomy  Care per surg appreciated  IV Hydration  diet tolerated   pain control  replete electrolytes  OOB to chair     # HTN  Monitor vitals  BP control  PRN     DVT and gi PPX

## 2022-07-28 NOTE — PROGRESS NOTE ADULT - SUBJECTIVE AND OBJECTIVE BOX
Name of Patient : MARIANELA COLBY  MRN: [id number]  Date of visit: 07-26-22       Subjective: Patient seen and examined. No new events except as noted.   DOing okay  tolerating clears     PAST MEDICAL & SURGICAL HISTORY:  Hypertension      GERD (gastroesophageal reflux disease)      Gastritis, Helicobacter pylori      Adenocarcinoma of stomach      Lung nodule      H/O colonoscopy      S/P endoscopy      Port-A-Cath in place      History of lung surgery  RLL wedge resection 4/13/22          REVIEW OF SYSTEMS:    CONSTITUTIONAL: No weakness, fevers or chills  EYES/ENT: No visual changes;  No vertigo or throat pain   NECK: No pain or stiffness  RESPIRATORY: No cough, wheezing, hemoptysis; No shortness of breath  CARDIOVASCULAR: No chest pain or palpitations  GASTROINTESTINAL: No abdominal or epigastric pain. No nausea, vomiting, or hematemesis; No diarrhea or constipation. No melena or hematochezia.  GENITOURINARY: No dysuria, frequency or hematuria  NEUROLOGICAL: No numbness or weakness  SKIN: No itching, burning, rashes, or lesions   All other review of systems is negative unless indicated above.    MEDICATIONS:  MEDICATIONS  (STANDING):  dextrose 5% + sodium chloride 0.45%. 1000 milliLiter(s) (42 mL/Hr) IV Continuous <Continuous>  heparin   Injectable 5000 Unit(s) SubCutaneous every 8 hours  pantoprazole  Injectable 40 milliGRAM(s) IV Push daily  sodium chloride 0.9% lock flush 3 milliLiter(s) IV Push every 8 hours      PHYSICAL EXAM:  T(C): 37.2 (07-26-22 @ 20:08), Max: 37.2 (07-26-22 @ 20:08)  HR: 96 (07-26-22 @ 20:08) (78 - 96)  BP: 147/89 (07-26-22 @ 20:08) (115/80 - 147/89)  RR: 18 (07-26-22 @ 20:08) (17 - 18)  SpO2: 97% (07-26-22 @ 20:08) (97% - 99%)  Wt(kg): --  I&O's Summary    25 Jul 2022 07:01  -  26 Jul 2022 07:00  --------------------------------------------------------  IN: 400 mL / OUT: 2910 mL / NET: -2510 mL    26 Jul 2022 07:01  -  26 Jul 2022 22:28  --------------------------------------------------------  IN: 358 mL / OUT: 1200 mL / NET: -842 mL          Appearance: Normal	  HEENT:  PERRLA   Lymphatic: No lymphadenopathy   Cardiovascular: Normal S1 S2, no JVD  Respiratory: normal effort , clear  Gastrointestinal:  Soft, Non-tender  Skin: No rashes,  warm to touch  Psychiatry:  Mood & affect appropriate  Musculuskeletal: No edema      All labs, Imaging and EKGs personally reviewed       07-25-22 @ 07:01  -  07-26-22 @ 07:00  --------------------------------------------------------  IN: 400 mL / OUT: 2910 mL / NET: -2510 mL    07-26-22 @ 07:01  -  07-26-22 @ 22:28  --------------------------------------------------------  IN: 358 mL / OUT: 1200 mL / NET: -842 mL                            11.1   10.48 )-----------( 220      ( 26 Jul 2022 05:40 )             35.1               07-26    136  |  100  |  18  ----------------------------<  106<H>  3.9   |  26  |  1.10    Ca    8.9      26 Jul 2022 05:40  Phos  4.1     07-26  Mg     1.90     07-26                                 
Name of Patient : MARIANELA COLBY  MRN: [id number]  Date of visit: 07-27-22      Subjective: Patient seen and examined. No new events except as noted.   Doing okay   tolerating lcears     REVIEW OF SYSTEMS:    CONSTITUTIONAL: No weakness, fevers or chills  EYES/ENT: No visual changes;  No vertigo or throat pain   NECK: No pain or stiffness  RESPIRATORY: No cough, wheezing, hemoptysis; No shortness of breath  CARDIOVASCULAR: No chest pain or palpitations  GASTROINTESTINAL: No abdominal or epigastric pain. No nausea, vomiting, or hematemesis; No diarrhea or constipation. No melena or hematochezia.  GENITOURINARY: No dysuria, frequency or hematuria  NEUROLOGICAL: No numbness or weakness  SKIN: No itching, burning, rashes, or lesions   All other review of systems is negative unless indicated above.    MEDICATIONS:  MEDICATIONS  (STANDING):  acetaminophen   IVPB .. 1000 milliGRAM(s) IV Intermittent every 6 hours  dextrose 5% + sodium chloride 0.45%. 1000 milliLiter(s) (42 mL/Hr) IV Continuous <Continuous>  heparin   Injectable 5000 Unit(s) SubCutaneous every 8 hours  pantoprazole  Injectable 40 milliGRAM(s) IV Push daily  sodium chloride 0.9% lock flush 3 milliLiter(s) IV Push every 8 hours      PHYSICAL EXAM:  T(C): 36.9 (07-27-22 @ 12:59), Max: 37.3 (07-27-22 @ 00:53)  HR: 90 (07-27-22 @ 12:59) (90 - 98)  BP: 136/90 (07-27-22 @ 12:59) (132/87 - 147/89)  RR: 17 (07-27-22 @ 12:59) (17 - 18)  SpO2: 97% (07-27-22 @ 12:59) (97% - 99%)  Wt(kg): --  I&O's Summary    26 Jul 2022 07:01  -  27 Jul 2022 07:00  --------------------------------------------------------  IN: 358 mL / OUT: 2050 mL / NET: -1692 mL    27 Jul 2022 07:01  -  27 Jul 2022 16:09  --------------------------------------------------------  IN: 294 mL / OUT: 625 mL / NET: -331 mL          Appearance: Normal	  HEENT:  PERRLA   Lymphatic: No lymphadenopathy   Cardiovascular: Normal S1 S2, no JVD  Respiratory: normal effort , clear  Gastrointestinal:  Soft, Non-tender  Skin: No rashes,  warm to touch  Psychiatry:  Mood & affect appropriate  Musculuskeletal: No edema      All labs, Imaging and EKGs personally reviewed       07-26-22 @ 07:01  -  07-27-22 @ 07:00  --------------------------------------------------------  IN: 358 mL / OUT: 2050 mL / NET: -1692 mL    07-27-22 @ 07:01  -  07-27-22 @ 16:09  --------------------------------------------------------  IN: 294 mL / OUT: 625 mL / NET: -331 mL                            11.2   10.89 )-----------( 202 ( 27 Jul 2022 07:09 )             35.7               07-27    136  |  99  |  11  ----------------------------<  139<H>  3.9   |  25  |  1.08    Ca    9.1      27 Jul 2022 07:09  Phos  3.0     07-27  Mg     1.80     07-27                                 
TEAM [D] Surgery Daily Progress Note  =====================================================  POD [3] s/p [RA-distal gastrectomy with Billroth II GJ for gastric cancer]    OVERNIGHT EVENTS: none    SUBJECTIVE: Patient seen and examined at bedside on AM rounds. Patient reports that they're feeling well. Tolerating clear liquid diet, denies nausea, vomiting. + Flatus/ + BM. OOB/Amublating as tolerated. Denies fever, chills, SOB, chest pain. Pain is well controlled.     VITAL SIGNS:  Vital Signs Last 24 Hrs  T(C): 36.4 (28 Jul 2022 07:48), Max: 37.1 (28 Jul 2022 00:18)  T(F): 97.6 (28 Jul 2022 07:48), Max: 98.8 (28 Jul 2022 00:18)  HR: 81 (28 Jul 2022 07:48) (74 - 98)  BP: 141/91 (28 Jul 2022 07:48) (127/84 - 141/91)  BP(mean): --  RR: 18 (28 Jul 2022 07:48) (17 - 18)  SpO2: 98% (28 Jul 2022 07:48) (97% - 98%)    Parameters below as of 28 Jul 2022 07:48  Patient On (Oxygen Delivery Method): room air    --------------------------------------------------------------------------------------    EXAM    General: NAD, resting in bed comfortably.  Cardiac: regular rate, warm and well perfused  Respiratory: Nonlabored respirations, normal cw expansion.  Abdomen: soft, nontender, nondistended. several robotic incisions is c/d/i, healing well.    Extremities: normal strength, FROM, no deformities    --------------------------------------------------------------------------------------    LABS                        11.1   10.22 )-----------( 218      ( 28 Jul 2022 05:33 )             34.6     07-28    139  |  102  |  9   ----------------------------<  112<H>  3.3<L>   |  27  |  0.99    Ca    9.0      28 Jul 2022 05:33  Phos  3.6     07-28  Mg     2.10     07-28    --------------------------------------------------------------------------------------    INS AND OUTS:    07-27-22 @ 07:01  -  07-28-22 @ 07:00  --------------------------------------------------------  IN: 1814 mL / OUT: 1625 mL / NET: 189 mL    07-28-22 @ 07:01  -  07-28-22 @ 08:05  --------------------------------------------------------  IN: 100 mL / OUT: 300 mL / NET: -200 mL    --------------------------------------------------------------------------------------
Name of Patient : MARIANELA COLBY  MRN: [id number]  Date of visit: 07-28-22       Subjective: Patient seen and examined. No new events except as noted.   doing okay     REVIEW OF SYSTEMS:    CONSTITUTIONAL: No weakness, fevers or chills  EYES/ENT: No visual changes;  No vertigo or throat pain   NECK: No pain or stiffness  RESPIRATORY: No cough, wheezing, hemoptysis; No shortness of breath  CARDIOVASCULAR: No chest pain or palpitations  GASTROINTESTINAL: No abdominal or epigastric pain. No nausea, vomiting, or hematemesis; No diarrhea or constipation. No melena or hematochezia.  GENITOURINARY: No dysuria, frequency or hematuria  NEUROLOGICAL: No numbness or weakness  SKIN: No itching, burning, rashes, or lesions   All other review of systems is negative unless indicated above.    MEDICATIONS:  MEDICATIONS  (STANDING):  acetaminophen     Tablet .. 1000 milliGRAM(s) Oral every 6 hours  amLODIPine   Tablet 2.5 milliGRAM(s) Oral daily  heparin   Injectable 5000 Unit(s) SubCutaneous every 8 hours  pantoprazole    Tablet 40 milliGRAM(s) Oral before breakfast  sodium chloride 0.9% lock flush 3 milliLiter(s) IV Push every 8 hours  sucralfate suspension 1 Gram(s) Oral every 6 hours  valsartan 160 milliGRAM(s) Oral daily      PHYSICAL EXAM:  T(C): 36.5 (07-28-22 @ 12:28), Max: 37.1 (07-28-22 @ 00:18)  HR: 76 (07-28-22 @ 12:28) (74 - 86)  BP: 147/94 (07-28-22 @ 12:28) (127/84 - 147/94)  RR: 18 (07-28-22 @ 12:28) (17 - 18)  SpO2: 97% (07-28-22 @ 12:28) (97% - 98%)  Wt(kg): --  I&O's Summary    27 Jul 2022 07:01  -  28 Jul 2022 07:00  --------------------------------------------------------  IN: 1814 mL / OUT: 1625 mL / NET: 189 mL    28 Jul 2022 07:01  -  28 Jul 2022 16:30  --------------------------------------------------------  IN: 300 mL / OUT: 650 mL / NET: -350 mL          Appearance: Normal	  HEENT:  PERRLA   Lymphatic: No lymphadenopathy   Cardiovascular: Normal S1 S2, no JVD  Respiratory: normal effort , clear  Gastrointestinal:  Soft, Non-tender  Skin: No rashes,  warm to touch  Psychiatry:  Mood & affect appropriate  Musculuskeletal: No edema      All labs, Imaging and EKGs personally reviewed       07-27-22 @ 07:01  -  07-28-22 @ 07:00  --------------------------------------------------------  IN: 1814 mL / OUT: 1625 mL / NET: 189 mL    07-28-22 @ 07:01  -  07-28-22 @ 16:30  --------------------------------------------------------  IN: 300 mL / OUT: 650 mL / NET: -350 mL                          11.1   10.22 )-----------( 218      ( 28 Jul 2022 05:33 )             34.6               07-28    139  |  102  |  9   ----------------------------<  112<H>  3.3<L>   |  27  |  0.99    Ca    9.0      28 Jul 2022 05:33  Phos  3.6     07-28  Mg     2.10     07-28                                   
Post Operative Note  Patient: MARIANELA COLBY 62y (10-Todd-1960) Male   MRN: [id number]  Location: [place] [address]  Visit: 07-25-22 Inpatient  Date: 07-25-22 @ 16:49    Procedure: S/P robotic assisted distal gastrectomy    Subjective: Patient seen and examined post operatively. Reports pain as controlled. Denies nausea, vomiting, fever, chills, chest pain, SOB, cough.      Objective:  Vitals: T(F): 98.2 (07-25-22 @ 16:00), Max: 98.2 (07-25-22 @ 07:03)  HR: 72 (07-25-22 @ 16:00)  BP: 134/92 (07-25-22 @ 16:00) (122/91 - 141/89)  RR: 13 (07-25-22 @ 16:00)  SpO2: 99% (07-25-22 @ 16:00)  Vent Settings:     In:   07-25-22 @ 07:01  -  07-25-22 @ 16:49  --------------------------------------------------------  IN: 400 mL      IV Fluids: lactated ringers. 1000 milliLiter(s) (30 mL/Hr) IV Continuous <Continuous>  lactated ringers. 1000 milliLiter(s) (100 mL/Hr) IV Continuous <Continuous>  sodium chloride 0.9% lock flush 3 milliLiter(s) IV Push every 8 hours      Out:   07-25-22 @ 07:01  -  07-25-22 @ 16:49  --------------------------------------------------------  OUT: 310 mL      EBL:     Voided Urine:   07-25-22 @ 07:01  -  07-25-22 @ 16:49  --------------------------------------------------------  OUT: 310 mL      Branch Catheter: yes 300 output    Physical Examination:  General: NAD, resting comfortably in bed  HEENT: Normocephalic atraumatic  Respiratory: Nonlabored respirations, normal CW expansion.  Cardio: S1S2, regular rate and rhythm.  Abdomen: softly distended, appropriately tender, surgical incisions are c/d/i. NGT/OGT*  Vascular: extremities are warm and well perfused.     Imaging:  No post-op imaging studies    Assessment:  62yMale patient S/P robotic assisted distal gastrectomy. Patient is feeling well without pain, nausea or vomiting.     Plan:  - Transfer patient to T  - Pain control PRN  - Diet: NPO  - IVF  - Branch, DTV  - Activity: as tolerated      Date/Time: 07-25-22 @ 16:49  
SUBJECTIVE: Patient seen and examined at bedside on AM rounds. No acute events overnight. Patient reports that they're feeling well. Tolerating a CLD diet, denies nausea, vomiting. Patients bowel function is -/-. OOB/Amublating as tolerated. Denies fever, chills.    --------------------------------------------------------------------------------------  VITAL SIGNS:  Vital Signs Last 24 Hrs  T(C): 36.6 (27 Jul 2022 09:00), Max: 37.3 (27 Jul 2022 00:53)  T(F): 97.8 (27 Jul 2022 09:00), Max: 99.1 (27 Jul 2022 00:53)  HR: 98 (27 Jul 2022 09:00) (87 - 98)  BP: 140/86 (27 Jul 2022 09:00) (129/89 - 147/89)  BP(mean): --  RR: 18 (27 Jul 2022 09:00) (17 - 18)  SpO2: 97% (27 Jul 2022 09:00) (97% - 99%)    Parameters below as of 27 Jul 2022 09:00  Patient On (Oxygen Delivery Method): room air  --------------------------------------------------------------------------------------    EXAM    General: NAD, resting in bed comfortably.  Cardiac: regular rate, warm and well perfused  Respiratory: Nonlabored respirations, normal cw expansion.  Abdomen: soft, nontender, nondistended. Abdominal incision is c/d/i.  Extremities: normal strength, FROM, no deformities  --------------------------------------------------------------------------------------    LABS                        11.2   10.89 )-----------( 202 ( 27 Jul 2022 07:09 )             35.7   07-27    136  |  99  |  11  ----------------------------<  139<H>  3.9   |  25  |  1.08    Ca    9.1      27 Jul 2022 07:09  Phos  3.0     07-27  Mg     1.80     07-27  --------------------------------------------------------------------------------------    INS AND OUTS:  I&O's Detail    26 Jul 2022 07:01  -  27 Jul 2022 07:00  --------------------------------------------------------  IN:    Oral Fluid: 358 mL  Total IN: 358 mL    OUT:    Indwelling Catheter - Urethral (mL): 200 mL    Voided (mL): 1850 mL  Total OUT: 2050 mL    Total NET: -1692 mL      27 Jul 2022 07:01  -  27 Jul 2022 11:18  --------------------------------------------------------  IN:    dextrose 5% + sodium chloride 0.45%: 126 mL  Total IN: 126 mL    OUT:    Voided (mL): 275 mL  Total OUT: 275 mL    Total NET: -149 mL        --------------------------------------------------------------------------------------
TEAM [D] Surgery Daily Progress Note  =====================================================  POD [1] s/p [robotic distal gastrectomy with Billroth II gastrojejunostomy] for gastric cancer    OVERNIGHT EVENTS: none    SUBJECTIVE: Patient seen and examined at bedside on AM rounds. Patient reports that they're feeling well. NPO, denies nausea, vomiting. - Flatus/ - BM. Has not yet been OOB. Denies fever, chills, SOB, chest pain. Pain well controlled on IV tylenol.       VITAL SIGNS:  Vital Signs Last 24 Hrs  T(C): 36.7 (26 Jul 2022 05:09), Max: 36.8 (25 Jul 2022 16:00)  T(F): 98.1 (26 Jul 2022 05:09), Max: 98.2 (25 Jul 2022 16:00)  HR: 82 (26 Jul 2022 05:09) (69 - 82)  BP: 132/84 (26 Jul 2022 05:09) (115/80 - 144/86)  BP(mean): 102 (25 Jul 2022 16:00) (90 - 103)  RR: 17 (26 Jul 2022 05:09) (11 - 19)  SpO2: 98% (26 Jul 2022 05:09) (95% - 100%)    Parameters below as of 26 Jul 2022 05:09  Patient On (Oxygen Delivery Method): room air    --------------------------------------------------------------------------------------    EXAM    General: NAD, resting in bed comfortably.  Cardiac: regular rate, warm and well perfused  Respiratory: Nonlabored respirations, normal cw expansion.  Abdomen: soft, nontender, nondistended. 5 robotic incisions is c/d/i. + geeta   Extremities: normal strength, FROM, no deformities    --------------------------------------------------------------------------------------    LABS    Vital Signs Last 24 Hrs  T(C): 36.7 (26 Jul 2022 05:09), Max: 36.8 (25 Jul 2022 16:00)  T(F): 98.1 (26 Jul 2022 05:09), Max: 98.2 (25 Jul 2022 16:00)  HR: 82 (26 Jul 2022 05:09) (69 - 82)  BP: 132/84 (26 Jul 2022 05:09) (115/80 - 144/86)  BP(mean): 102 (25 Jul 2022 16:00) (90 - 103)  RR: 17 (26 Jul 2022 05:09) (11 - 19)  SpO2: 98% (26 Jul 2022 05:09) (95% - 100%)    Parameters below as of 26 Jul 2022 05:09  Patient On (Oxygen Delivery Method): room air    --------------------------------------------------------------------------------------    INS AND OUTS:    07-25-22 @ 07:01  -  07-26-22 @ 07:00  --------------------------------------------------------  IN: 400 mL / OUT: 2910 mL / NET: -2510 mL    --------------------------------------------------------------------------------------

## 2022-08-05 LAB — SURGICAL PATHOLOGY STUDY: SIGNIFICANT CHANGE UP

## 2022-08-11 ENCOUNTER — APPOINTMENT (OUTPATIENT)
Dept: SURGICAL ONCOLOGY | Facility: CLINIC | Age: 62
End: 2022-08-11

## 2022-08-11 VITALS
WEIGHT: 173 LBS | HEIGHT: 70 IN | SYSTOLIC BLOOD PRESSURE: 113 MMHG | HEART RATE: 83 BPM | BODY MASS INDEX: 24.77 KG/M2 | TEMPERATURE: 97.6 F | DIASTOLIC BLOOD PRESSURE: 80 MMHG | OXYGEN SATURATION: 96 % | RESPIRATION RATE: 16 BRPM

## 2022-08-11 PROCEDURE — 99024 POSTOP FOLLOW-UP VISIT: CPT

## 2022-08-11 NOTE — HISTORY OF PRESENT ILLNESS
[de-identified] : Mr. Burden is a 61 y/o male wth history of dyspepsia/acid reflux.  He was recently found to be slightly anemic on routine blood work.  He underwent his first EGD by Dr. 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  Overall he is tolerating well.  He is eating and maintaining well.  He denies abdominal pain.\par Restaging CT chest/abdomen/pelvis 06/16/2022 does not show evidence of gastric cancer progression.\par \par 08/11/2022: Patient is s/p robotic distal gastrectomy with modified D2 lymphadenectomy 07/25/2022.  He recovered well and was discharged home on POD#03.  He currently feels well and denies abdominal pain.  He is tolerating diet without nausea/emesis.\par Pathology: residual moderately to poorly differentiated adenocarcinoma, T2N0 (0/26), margin negative.

## 2022-08-11 NOTE — ASSESSMENT
[FreeTextEntry1] : Doing well after robotic gastrectomy.\par Partial response to pre-operative chemotherapy.\par To follow up with medical oncology to complete post-operative chemotherapy ( FLOT x 3 cycles).\par No indication for adjuvant radiation.\par Follow up in office after completion of treatment.

## 2022-08-16 ENCOUNTER — OUTPATIENT (OUTPATIENT)
Dept: OUTPATIENT SERVICES | Facility: HOSPITAL | Age: 62
LOS: 1 days | Discharge: HOME | End: 2022-08-16

## 2022-08-16 DIAGNOSIS — C16.9 MALIGNANT NEOPLASM OF STOMACH, UNSPECIFIED: ICD-10-CM

## 2022-08-16 DIAGNOSIS — Z98.890 OTHER SPECIFIED POSTPROCEDURAL STATES: Chronic | ICD-10-CM

## 2022-08-16 DIAGNOSIS — Z95.828 PRESENCE OF OTHER VASCULAR IMPLANTS AND GRAFTS: Chronic | ICD-10-CM

## 2022-08-19 ENCOUNTER — APPOINTMENT (OUTPATIENT)
Dept: HEMATOLOGY ONCOLOGY | Facility: CLINIC | Age: 62
End: 2022-08-19

## 2022-08-19 VITALS
OXYGEN SATURATION: 99 % | WEIGHT: 177.03 LBS | BODY MASS INDEX: 25.34 KG/M2 | HEIGHT: 70 IN | RESPIRATION RATE: 16 BRPM | SYSTOLIC BLOOD PRESSURE: 122 MMHG | HEART RATE: 82 BPM | DIASTOLIC BLOOD PRESSURE: 84 MMHG | TEMPERATURE: 98.4 F

## 2022-08-19 PROCEDURE — 99214 OFFICE O/P EST MOD 30 MIN: CPT

## 2022-08-19 RX ORDER — OMEPRAZOLE 40 MG/1
40 CAPSULE, DELAYED RELEASE ORAL DAILY
Refills: 0 | Status: DISCONTINUED | COMMUNITY
Start: 2022-03-03 | End: 2022-08-19

## 2022-08-19 RX ORDER — AMLODIPINE BESYLATE 2.5 MG/1
2.5 TABLET ORAL DAILY
Refills: 0 | Status: DISCONTINUED | COMMUNITY
Start: 2022-03-03 | End: 2022-08-19

## 2022-08-19 NOTE — PHYSICAL EXAM
[Fully active, able to carry on all pre-disease performance without restriction] : Status 0 - Fully active, able to carry on all pre-disease performance without restriction [Normal] : affect appropriate [de-identified] : anicteric  [de-identified] : no jVD  [de-identified] : normal respiratory effort, no audible wheeze [de-identified] : reg rate  [de-identified] : no LE swelling B/L [de-identified] : well healed incsions

## 2022-08-19 NOTE — HISTORY OF PRESENT ILLNESS
[Disease: _____________________] : Disease: [unfilled] [T: ___] : T[unfilled] [N: ___] : N[unfilled] [M: ___] : M[unfilled] [AJCC Stage: ____] : AJCC Stage: [unfilled] [de-identified] : Patient is a 63 y/o M with known PMHx of HTN and pre-diabetes who first presented to a GI, Dr. Paul, 9/29/21 c/o abdominal bloating and heart burn symptoms especially at night progressive for ~ 2 year.  He was initially managed conservatively with an ordered H. pylori test (patient never went for testing), trial of PPI and life style modifications. Patient states that he saw his PCP in October 2021 for routine follow up and was noted to have a drop in his Hgb from 13.9 to 12.3.  He was started on OTC iron supplements and repeat labs 1/2022 were improved.  Given the HANANE and the GI symptoms, the patient returned to a GI for re-evaluation. The patient presented to Dr. Juan Antonio Steinberg on 2/20/22 and an endoscopy was performed on 2/21/22.  The EGD showed evidence of reflux esophagitis at the EG-junction, nonactive mild gastritis in the antrum and a large ulcer was seen at incisura.  Biopsies taken at the stomach antrum showed active chronic H. pylori gastritis with intestinal metaplasia.  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  A CT A/P performed on 2/25/22 showed a sequela of small airways inflammatory disease/bronchiolitis within the left lower lobe and to a lesser extent right middle and right lower lobes of the lungs. The stomach was only partially distended but there was no definite gastric mass identified.  There was otherwise no evidence of metastatic disease in the abdomen.  The patient started treatment for H. pylori earlier this week and a colonoscopy was performed 2 days ago with a polyp removed and results pending.  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  No submucosa is present.  A background of gastritis and acute inflammatory exudate is noted [de-identified] : CEA [FreeTextEntry1] : FLOT x 5 [de-identified] : post op , ypT2N0\par ding well post op \par no neuropathy or abdominal pain \par cleared by surgery to restart chemo \par no fever or chills

## 2022-09-01 ENCOUNTER — APPOINTMENT (OUTPATIENT)
Dept: INFUSION THERAPY | Facility: HOSPITAL | Age: 62
End: 2022-09-01

## 2022-09-01 ENCOUNTER — RESULT REVIEW (OUTPATIENT)
Age: 62
End: 2022-09-01

## 2022-09-01 DIAGNOSIS — R11.2 NAUSEA WITH VOMITING, UNSPECIFIED: ICD-10-CM

## 2022-09-01 DIAGNOSIS — Z51.11 ENCOUNTER FOR ANTINEOPLASTIC CHEMOTHERAPY: ICD-10-CM

## 2022-09-01 LAB
ALBUMIN SERPL ELPH-MCNC: 4.5 G/DL — SIGNIFICANT CHANGE UP (ref 3.3–5)
ALP SERPL-CCNC: 73 U/L — SIGNIFICANT CHANGE UP (ref 40–120)
ALT FLD-CCNC: 26 U/L — SIGNIFICANT CHANGE UP (ref 10–45)
ANION GAP SERPL CALC-SCNC: 17 MMOL/L — SIGNIFICANT CHANGE UP (ref 5–17)
AST SERPL-CCNC: 19 U/L — SIGNIFICANT CHANGE UP (ref 10–40)
BASOPHILS # BLD AUTO: 0.02 K/UL — SIGNIFICANT CHANGE UP (ref 0–0.2)
BASOPHILS NFR BLD AUTO: 0.2 % — SIGNIFICANT CHANGE UP (ref 0–2)
BILIRUB SERPL-MCNC: 0.3 MG/DL — SIGNIFICANT CHANGE UP (ref 0.2–1.2)
BUN SERPL-MCNC: 26 MG/DL — HIGH (ref 7–23)
CALCIUM SERPL-MCNC: 9.6 MG/DL — SIGNIFICANT CHANGE UP (ref 8.4–10.5)
CHLORIDE SERPL-SCNC: 101 MMOL/L — SIGNIFICANT CHANGE UP (ref 96–108)
CO2 SERPL-SCNC: 21 MMOL/L — LOW (ref 22–31)
CREAT SERPL-MCNC: 1.19 MG/DL — SIGNIFICANT CHANGE UP (ref 0.5–1.3)
EGFR: 69 ML/MIN/1.73M2 — SIGNIFICANT CHANGE UP
EOSINOPHIL # BLD AUTO: 0 K/UL — SIGNIFICANT CHANGE UP (ref 0–0.5)
EOSINOPHIL NFR BLD AUTO: 0 % — SIGNIFICANT CHANGE UP (ref 0–6)
GLUCOSE SERPL-MCNC: 158 MG/DL — HIGH (ref 70–99)
HCT VFR BLD CALC: 38.4 % — LOW (ref 39–50)
HGB BLD-MCNC: 12.8 G/DL — LOW (ref 13–17)
IMM GRANULOCYTES NFR BLD AUTO: 0.3 % — SIGNIFICANT CHANGE UP (ref 0–1.5)
LYMPHOCYTES # BLD AUTO: 18.9 % — SIGNIFICANT CHANGE UP (ref 13–44)
LYMPHOCYTES # BLD AUTO: 2.1 K/UL — SIGNIFICANT CHANGE UP (ref 1–3.3)
MCHC RBC-ENTMCNC: 30.4 PG — SIGNIFICANT CHANGE UP (ref 27–34)
MCHC RBC-ENTMCNC: 33.3 G/DL — SIGNIFICANT CHANGE UP (ref 32–36)
MCV RBC AUTO: 91.2 FL — SIGNIFICANT CHANGE UP (ref 80–100)
MONOCYTES # BLD AUTO: 0.24 K/UL — SIGNIFICANT CHANGE UP (ref 0–0.9)
MONOCYTES NFR BLD AUTO: 2.2 % — SIGNIFICANT CHANGE UP (ref 2–14)
NEUTROPHILS # BLD AUTO: 8.75 K/UL — HIGH (ref 1.8–7.4)
NEUTROPHILS NFR BLD AUTO: 78.4 % — HIGH (ref 43–77)
NRBC # BLD: 0 /100 WBCS — SIGNIFICANT CHANGE UP (ref 0–0)
PLATELET # BLD AUTO: 301 K/UL — SIGNIFICANT CHANGE UP (ref 150–400)
POTASSIUM SERPL-MCNC: 4.3 MMOL/L — SIGNIFICANT CHANGE UP (ref 3.5–5.3)
POTASSIUM SERPL-SCNC: 4.3 MMOL/L — SIGNIFICANT CHANGE UP (ref 3.5–5.3)
PROT SERPL-MCNC: 6.8 G/DL — SIGNIFICANT CHANGE UP (ref 6–8.3)
RBC # BLD: 4.21 M/UL — SIGNIFICANT CHANGE UP (ref 4.2–5.8)
RBC # FLD: 13.8 % — SIGNIFICANT CHANGE UP (ref 10.3–14.5)
SODIUM SERPL-SCNC: 140 MMOL/L — SIGNIFICANT CHANGE UP (ref 135–145)
WBC # BLD: 11.14 K/UL — HIGH (ref 3.8–10.5)
WBC # FLD AUTO: 11.14 K/UL — HIGH (ref 3.8–10.5)

## 2022-09-02 ENCOUNTER — APPOINTMENT (OUTPATIENT)
Dept: INFUSION THERAPY | Facility: HOSPITAL | Age: 62
End: 2022-09-02

## 2022-09-14 ENCOUNTER — RESULT REVIEW (OUTPATIENT)
Age: 62
End: 2022-09-14

## 2022-09-14 ENCOUNTER — APPOINTMENT (OUTPATIENT)
Dept: HEMATOLOGY ONCOLOGY | Facility: CLINIC | Age: 62
End: 2022-09-14

## 2022-09-14 VITALS
RESPIRATION RATE: 16 BRPM | HEIGHT: 70 IN | HEART RATE: 82 BPM | BODY MASS INDEX: 25.19 KG/M2 | OXYGEN SATURATION: 99 % | WEIGHT: 175.93 LBS | SYSTOLIC BLOOD PRESSURE: 143 MMHG | TEMPERATURE: 97.3 F | DIASTOLIC BLOOD PRESSURE: 90 MMHG

## 2022-09-14 LAB
ALBUMIN SERPL ELPH-MCNC: 4.4 G/DL
ALP BLD-CCNC: 79 U/L
ALT SERPL-CCNC: 48 U/L
ANION GAP SERPL CALC-SCNC: 12 MMOL/L
AST SERPL-CCNC: 34 U/L
BASOPHILS # BLD AUTO: 0.06 K/UL — SIGNIFICANT CHANGE UP (ref 0–0.2)
BASOPHILS NFR BLD AUTO: 1.7 % — SIGNIFICANT CHANGE UP (ref 0–2)
BILIRUB SERPL-MCNC: 0.2 MG/DL
BUN SERPL-MCNC: 15 MG/DL
CALCIUM SERPL-MCNC: 9.2 MG/DL
CHLORIDE SERPL-SCNC: 108 MMOL/L
CO2 SERPL-SCNC: 22 MMOL/L
CREAT SERPL-MCNC: 1.12 MG/DL
EGFR: 74 ML/MIN/1.73M2
EOSINOPHIL # BLD AUTO: 0.01 K/UL — SIGNIFICANT CHANGE UP (ref 0–0.5)
EOSINOPHIL NFR BLD AUTO: 0.3 % — SIGNIFICANT CHANGE UP (ref 0–6)
GLUCOSE SERPL-MCNC: 135 MG/DL
HCT VFR BLD CALC: 37.2 % — LOW (ref 39–50)
HGB BLD-MCNC: 12.2 G/DL — LOW (ref 13–17)
IMM GRANULOCYTES NFR BLD AUTO: 1.1 % — SIGNIFICANT CHANGE UP (ref 0–1.5)
LYMPHOCYTES # BLD AUTO: 0.98 K/UL — LOW (ref 1–3.3)
LYMPHOCYTES # BLD AUTO: 27.2 % — SIGNIFICANT CHANGE UP (ref 13–44)
MCHC RBC-ENTMCNC: 30 PG — SIGNIFICANT CHANGE UP (ref 27–34)
MCHC RBC-ENTMCNC: 32.8 G/DL — SIGNIFICANT CHANGE UP (ref 32–36)
MCV RBC AUTO: 91.4 FL — SIGNIFICANT CHANGE UP (ref 80–100)
MONOCYTES # BLD AUTO: 0.13 K/UL — SIGNIFICANT CHANGE UP (ref 0–0.9)
MONOCYTES NFR BLD AUTO: 3.6 % — SIGNIFICANT CHANGE UP (ref 2–14)
NEUTROPHILS # BLD AUTO: 2.38 K/UL — SIGNIFICANT CHANGE UP (ref 1.8–7.4)
NEUTROPHILS NFR BLD AUTO: 66.1 % — SIGNIFICANT CHANGE UP (ref 43–77)
NRBC # BLD: 0 /100 WBCS — SIGNIFICANT CHANGE UP (ref 0–0)
PLATELET # BLD AUTO: 252 K/UL — SIGNIFICANT CHANGE UP (ref 150–400)
POTASSIUM SERPL-SCNC: 5.2 MMOL/L
PROT SERPL-MCNC: 6.6 G/DL
RBC # BLD: 4.07 M/UL — LOW (ref 4.2–5.8)
RBC # FLD: 13.8 % — SIGNIFICANT CHANGE UP (ref 10.3–14.5)
SODIUM SERPL-SCNC: 142 MMOL/L
WBC # BLD: 3.6 K/UL — LOW (ref 3.8–10.5)
WBC # FLD AUTO: 3.6 K/UL — LOW (ref 3.8–10.5)

## 2022-09-14 PROCEDURE — 99214 OFFICE O/P EST MOD 30 MIN: CPT

## 2022-09-14 RX ORDER — SUCRALFATE 1 G/10ML
1 SUSPENSION ORAL
Qty: 1260 | Refills: 0 | Status: DISCONTINUED | COMMUNITY
Start: 2022-07-28 | End: 2022-09-14

## 2022-09-14 RX ORDER — OXYCODONE 5 MG/1
5 TABLET ORAL
Qty: 20 | Refills: 0 | Status: DISCONTINUED | COMMUNITY
Start: 2022-04-15 | End: 2022-09-14

## 2022-09-14 RX ORDER — PANTOPRAZOLE 40 MG/1
40 TABLET, DELAYED RELEASE ORAL
Qty: 30 | Refills: 0 | Status: DISCONTINUED | COMMUNITY
Start: 2022-07-28 | End: 2022-09-14

## 2022-09-14 RX ORDER — TIOTROPIUM BROMIDE AND OLODATEROL 3.124; 2.736 UG/1; UG/1
2.5-2.5 SPRAY, METERED RESPIRATORY (INHALATION)
Qty: 4 | Refills: 0 | Status: DISCONTINUED | COMMUNITY
Start: 2022-04-11 | End: 2022-09-14

## 2022-09-14 NOTE — PHYSICAL EXAM
[Fully active, able to carry on all pre-disease performance without restriction] : Status 0 - Fully active, able to carry on all pre-disease performance without restriction [Normal] : affect appropriate [de-identified] : anicteric  [de-identified] : no jVD  [de-identified] : normal respiratory effort, no audible wheeze [de-identified] : reg rate  [de-identified] : no LE swelling B/L [de-identified] : well healed incsions

## 2022-09-14 NOTE — HISTORY OF PRESENT ILLNESS
[Disease: _____________________] : Disease: [unfilled] [T: ___] : T[unfilled] [N: ___] : N[unfilled] [M: ___] : M[unfilled] [AJCC Stage: ____] : AJCC Stage: [unfilled] [de-identified] : Patient is a 61 y/o M with known PMHx of HTN and pre-diabetes who first presented to a GI, Dr. Paul, 9/29/21 c/o abdominal bloating and heart burn symptoms especially at night progressive for ~ 2 year.  He was initially managed conservatively with an ordered H. pylori test (patient never went for testing), trial of PPI and life style modifications. Patient states that he saw his PCP in October 2021 for routine follow up and was noted to have a drop in his Hgb from 13.9 to 12.3.  He was started on OTC iron supplements and repeat labs 1/2022 were improved.  Given the HANANE and the GI symptoms, the patient returned to a GI for re-evaluation. The patient presented to Dr. Juan Antonio Steinberg on 2/20/22 and an endoscopy was performed on 2/21/22.  The EGD showed evidence of reflux esophagitis at the EG-junction, nonactive mild gastritis in the antrum and a large ulcer was seen at incisura.  Biopsies taken at the stomach antrum showed active chronic H. pylori gastritis with intestinal metaplasia.  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  A CT A/P performed on 2/25/22 showed a sequela of small airways inflammatory disease/bronchiolitis within the left lower lobe and to a lesser extent right middle and right lower lobes of the lungs. The stomach was only partially distended but there was no definite gastric mass identified.  There was otherwise no evidence of metastatic disease in the abdomen.  The patient started treatment for H. pylori earlier this week and a colonoscopy was performed 2 days ago with a polyp removed and results pending.  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  No submucosa is present.  A background of gastritis and acute inflammatory exudate is noted [de-identified] : CEA [FreeTextEntry1] : FLOT x 5 --> surgery --> FLOT  [de-identified] : tolerating FLOT \par persistent grade 1 neuropathy between cycles \par no abdominal pain, fevers or chills

## 2022-09-15 ENCOUNTER — APPOINTMENT (OUTPATIENT)
Dept: INFUSION THERAPY | Facility: HOSPITAL | Age: 62
End: 2022-09-15

## 2022-09-16 ENCOUNTER — APPOINTMENT (OUTPATIENT)
Dept: INFUSION THERAPY | Facility: HOSPITAL | Age: 62
End: 2022-09-16

## 2022-09-28 ENCOUNTER — APPOINTMENT (OUTPATIENT)
Dept: HEMATOLOGY ONCOLOGY | Facility: CLINIC | Age: 62
End: 2022-09-28

## 2022-09-28 VITALS
DIASTOLIC BLOOD PRESSURE: 93 MMHG | HEART RATE: 83 BPM | TEMPERATURE: 97.3 F | RESPIRATION RATE: 16 BRPM | SYSTOLIC BLOOD PRESSURE: 147 MMHG | BODY MASS INDEX: 25.19 KG/M2 | HEIGHT: 70 IN | OXYGEN SATURATION: 99 % | WEIGHT: 175.93 LBS

## 2022-09-28 DIAGNOSIS — Z51.11 ENCOUNTER FOR ANTINEOPLASTIC CHEMOTHERAPY: ICD-10-CM

## 2022-09-28 PROCEDURE — 99214 OFFICE O/P EST MOD 30 MIN: CPT

## 2022-09-29 ENCOUNTER — RESULT REVIEW (OUTPATIENT)
Age: 62
End: 2022-09-29

## 2022-09-29 ENCOUNTER — APPOINTMENT (OUTPATIENT)
Dept: INFUSION THERAPY | Facility: HOSPITAL | Age: 62
End: 2022-09-29

## 2022-09-29 LAB
BASOPHILS # BLD AUTO: 0.03 K/UL — SIGNIFICANT CHANGE UP (ref 0–0.2)
BASOPHILS NFR BLD AUTO: 0.6 % — SIGNIFICANT CHANGE UP (ref 0–2)
EOSINOPHIL # BLD AUTO: 0 K/UL — SIGNIFICANT CHANGE UP (ref 0–0.5)
EOSINOPHIL NFR BLD AUTO: 0 % — SIGNIFICANT CHANGE UP (ref 0–6)
HCT VFR BLD CALC: 37.1 % — LOW (ref 39–50)
HGB BLD-MCNC: 12.3 G/DL — LOW (ref 13–17)
IMM GRANULOCYTES NFR BLD AUTO: 1.4 % — HIGH (ref 0–0.9)
LYMPHOCYTES # BLD AUTO: 1.55 K/UL — SIGNIFICANT CHANGE UP (ref 1–3.3)
LYMPHOCYTES # BLD AUTO: 30 % — SIGNIFICANT CHANGE UP (ref 13–44)
MCHC RBC-ENTMCNC: 29.9 PG — SIGNIFICANT CHANGE UP (ref 27–34)
MCHC RBC-ENTMCNC: 33.2 G/DL — SIGNIFICANT CHANGE UP (ref 32–36)
MCV RBC AUTO: 90 FL — SIGNIFICANT CHANGE UP (ref 80–100)
MONOCYTES # BLD AUTO: 0.68 K/UL — SIGNIFICANT CHANGE UP (ref 0–0.9)
MONOCYTES NFR BLD AUTO: 13.2 % — SIGNIFICANT CHANGE UP (ref 2–14)
NEUTROPHILS # BLD AUTO: 2.83 K/UL — SIGNIFICANT CHANGE UP (ref 1.8–7.4)
NEUTROPHILS NFR BLD AUTO: 54.8 % — SIGNIFICANT CHANGE UP (ref 43–77)
NRBC # BLD: 0 /100 WBCS — SIGNIFICANT CHANGE UP (ref 0–0)
PLATELET # BLD AUTO: 248 K/UL — SIGNIFICANT CHANGE UP (ref 150–400)
RBC # BLD: 4.12 M/UL — LOW (ref 4.2–5.8)
RBC # FLD: 13.7 % — SIGNIFICANT CHANGE UP (ref 10.3–14.5)
WBC # BLD: 5.16 K/UL — SIGNIFICANT CHANGE UP (ref 3.8–10.5)
WBC # FLD AUTO: 5.16 K/UL — SIGNIFICANT CHANGE UP (ref 3.8–10.5)

## 2022-09-29 NOTE — PHYSICAL EXAM
[Fully active, able to carry on all pre-disease performance without restriction] : Status 0 - Fully active, able to carry on all pre-disease performance without restriction [Normal] : affect appropriate [de-identified] : anicteric  [de-identified] : no jVD  [de-identified] : normal respiratory effort, no audible wheeze [de-identified] : reg rate  [de-identified] : no LE swelling B/L [de-identified] : well healed incsions, non-distended

## 2022-09-29 NOTE — HISTORY OF PRESENT ILLNESS
[Disease: _____________________] : Disease: [unfilled] [T: ___] : T[unfilled] [N: ___] : N[unfilled] [M: ___] : M[unfilled] [AJCC Stage: ____] : AJCC Stage: [unfilled] [de-identified] : Patient is a 63 y/o M with known PMHx of HTN and pre-diabetes who first presented to a GI, Dr. Paul, 9/29/21 c/o abdominal bloating and heart burn symptoms especially at night progressive for ~ 2 year.  He was initially managed conservatively with an ordered H. pylori test (patient never went for testing), trial of PPI and life style modifications. Patient states that he saw his PCP in October 2021 for routine follow up and was noted to have a drop in his Hgb from 13.9 to 12.3.  He was started on OTC iron supplements and repeat labs 1/2022 were improved.  Given the HANANE and the GI symptoms, the patient returned to a GI for re-evaluation. The patient presented to Dr. Juan Antonio Steinberg on 2/20/22 and an endoscopy was performed on 2/21/22.  The EGD showed evidence of reflux esophagitis at the EG-junction, nonactive mild gastritis in the antrum and a large ulcer was seen at incisura.  Biopsies taken at the stomach antrum showed active chronic H. pylori gastritis with intestinal metaplasia.  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  A CT A/P performed on 2/25/22 showed a sequela of small airways inflammatory disease/bronchiolitis within the left lower lobe and to a lesser extent right middle and right lower lobes of the lungs. The stomach was only partially distended but there was no definite gastric mass identified.  There was otherwise no evidence of metastatic disease in the abdomen.  The patient started treatment for H. pylori earlier this week and a colonoscopy was performed 2 days ago with a polyp removed and results pending.  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  No submucosa is present.  A background of gastritis and acute inflammatory exudate is noted [de-identified] : CEA [FreeTextEntry1] : FLOT x 5 --> surgery --> FLOT x 3 [de-identified] : Patient presents for follow up today and is scheduled for C8 of 8 of FLOT tomorrow.  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  He admits to some nausea and constipation in the first few days after treatment but then it resolves.  Denies fever, HA, abdominal pain, vomiting or swelling.

## 2022-09-30 ENCOUNTER — APPOINTMENT (OUTPATIENT)
Dept: INFUSION THERAPY | Facility: HOSPITAL | Age: 62
End: 2022-09-30

## 2022-10-19 ENCOUNTER — RESULT REVIEW (OUTPATIENT)
Age: 62
End: 2022-10-19

## 2022-10-24 ENCOUNTER — OUTPATIENT (OUTPATIENT)
Dept: OUTPATIENT SERVICES | Facility: HOSPITAL | Age: 62
LOS: 1 days | End: 2022-10-24
Payer: COMMERCIAL

## 2022-10-24 ENCOUNTER — APPOINTMENT (OUTPATIENT)
Dept: CT IMAGING | Facility: IMAGING CENTER | Age: 62
End: 2022-10-24

## 2022-10-24 DIAGNOSIS — Z98.890 OTHER SPECIFIED POSTPROCEDURAL STATES: Chronic | ICD-10-CM

## 2022-10-24 DIAGNOSIS — Z95.828 PRESENCE OF OTHER VASCULAR IMPLANTS AND GRAFTS: Chronic | ICD-10-CM

## 2022-10-24 DIAGNOSIS — C16.9 MALIGNANT NEOPLASM OF STOMACH, UNSPECIFIED: ICD-10-CM

## 2022-10-24 PROCEDURE — 74177 CT ABD & PELVIS W/CONTRAST: CPT | Mod: 26

## 2022-10-24 PROCEDURE — 71260 CT THORAX DX C+: CPT

## 2022-10-24 PROCEDURE — 74177 CT ABD & PELVIS W/CONTRAST: CPT

## 2022-10-24 PROCEDURE — 71260 CT THORAX DX C+: CPT | Mod: 26

## 2022-10-26 ENCOUNTER — OUTPATIENT (OUTPATIENT)
Dept: OUTPATIENT SERVICES | Facility: HOSPITAL | Age: 62
LOS: 1 days | Discharge: HOME | End: 2022-10-26

## 2022-10-26 DIAGNOSIS — Z95.828 PRESENCE OF OTHER VASCULAR IMPLANTS AND GRAFTS: Chronic | ICD-10-CM

## 2022-10-26 DIAGNOSIS — Z98.890 OTHER SPECIFIED POSTPROCEDURAL STATES: Chronic | ICD-10-CM

## 2022-10-26 DIAGNOSIS — C16.9 MALIGNANT NEOPLASM OF STOMACH, UNSPECIFIED: ICD-10-CM

## 2022-11-02 ENCOUNTER — APPOINTMENT (OUTPATIENT)
Dept: HEMATOLOGY ONCOLOGY | Facility: CLINIC | Age: 62
End: 2022-11-02

## 2022-11-02 VITALS
SYSTOLIC BLOOD PRESSURE: 127 MMHG | DIASTOLIC BLOOD PRESSURE: 76 MMHG | HEART RATE: 66 BPM | RESPIRATION RATE: 16 BRPM | HEIGHT: 70 IN | WEIGHT: 178.13 LBS | BODY MASS INDEX: 25.5 KG/M2 | TEMPERATURE: 97.2 F | OXYGEN SATURATION: 99 %

## 2022-11-02 PROCEDURE — 99214 OFFICE O/P EST MOD 30 MIN: CPT

## 2022-11-02 NOTE — PHYSICAL EXAM
[Fully active, able to carry on all pre-disease performance without restriction] : Status 0 - Fully active, able to carry on all pre-disease performance without restriction [Normal] : affect appropriate [de-identified] : anicteric  [de-identified] : no jVD  [de-identified] : normal respiratory effort, no audible wheeze [de-identified] : reg rate  [de-identified] : no LE swelling B/L [de-identified] : well healed incsions

## 2022-11-02 NOTE — HISTORY OF PRESENT ILLNESS
[Disease: _____________________] : Disease: [unfilled] [T: ___] : T[unfilled] [N: ___] : N[unfilled] [M: ___] : M[unfilled] [AJCC Stage: ____] : AJCC Stage: [unfilled] [de-identified] : Patient is a 63 y/o M with known PMHx of HTN and pre-diabetes who first presented to a GI, Dr. Paul, 9/29/21 c/o abdominal bloating and heart burn symptoms especially at night progressive for ~ 2 year.  He was initially managed conservatively with an ordered H. pylori test (patient never went for testing), trial of PPI and life style modifications. Patient states that he saw his PCP in October 2021 for routine follow up and was noted to have a drop in his Hgb from 13.9 to 12.3.  He was started on OTC iron supplements and repeat labs 1/2022 were improved.  Given the HANANE and the GI symptoms, the patient returned to a GI for re-evaluation. The patient presented to Dr. Juan Antonio Steinberg on 2/20/22 and an endoscopy was performed on 2/21/22.  The EGD showed evidence of reflux esophagitis at the EG-junction, nonactive mild gastritis in the antrum and a large ulcer was seen at incisura.  Biopsies taken at the stomach antrum showed active chronic H. pylori gastritis with intestinal metaplasia.  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  A CT A/P performed on 2/25/22 showed a sequela of small airways inflammatory disease/bronchiolitis within the left lower lobe and to a lesser extent right middle and right lower lobes of the lungs. The stomach was only partially distended but there was no definite gastric mass identified.  There was otherwise no evidence of metastatic disease in the abdomen.  The patient started treatment for H. pylori earlier this week and a colonoscopy was performed 2 days ago with a polyp removed and results pending.  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  No submucosa is present.  A background of gastritis and acute inflammatory exudate is noted [de-identified] : CEA [FreeTextEntry1] : FLOT x 5 --> surgery --> FLOT x 3  [de-identified] : presents in follow up , no acute complaints\par no fever or chills \par no cough\par no abdominal pain

## 2022-11-15 ENCOUNTER — APPOINTMENT (OUTPATIENT)
Dept: SURGICAL ONCOLOGY | Facility: CLINIC | Age: 62
End: 2022-11-15

## 2022-11-15 VITALS
TEMPERATURE: 97.8 F | HEART RATE: 70 BPM | RESPIRATION RATE: 17 BRPM | OXYGEN SATURATION: 99 % | DIASTOLIC BLOOD PRESSURE: 86 MMHG | WEIGHT: 176 LBS | SYSTOLIC BLOOD PRESSURE: 134 MMHG | BODY MASS INDEX: 25.2 KG/M2 | HEIGHT: 70 IN

## 2022-11-15 PROCEDURE — 99213 OFFICE O/P EST LOW 20 MIN: CPT

## 2022-11-17 NOTE — PHYSICAL EXAM
[FreeTextEntry1] : Abdomen: soft, nontender/nondistended.  Incisions well healed. [Normal] : supple, no neck mass and thyroid not enlarged [Normal Neck Lymph Nodes] : normal neck lymph nodes  [Normal Supraclavicular Lymph Nodes] : normal supraclavicular lymph nodes [Normal Groin Lymph Nodes] : normal groin lymph nodes [Normal Axillary Lymph Nodes] : normal axillary lymph nodes [Normal] : oriented to person, place and time, with appropriate affect [de-identified] : well healed laparoscopy incisions.

## 2022-11-17 NOTE — HISTORY OF PRESENT ILLNESS
[de-identified] : Mr. Burden is a 63 y/o male wth history of dyspepsia/acid reflux.  He was recently found to be slightly anemic on routine blood work.  He underwent his first EGD by Dr. 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  Overall he is tolerating well.  He is eating and maintaining well.  He denies abdominal pain.\par Restaging CT chest/abdomen/pelvis 06/16/2022 does not show evidence of gastric cancer progression.\par \par 08/11/2022: Patient is s/p robotic distal gastrectomy with modified D2 lymphadenectomy 07/25/2022.  He recovered well and was discharged home on POD#03.  He currently feels well and denies abdominal pain.  He is tolerating diet without nausea/emesis.\par Pathology: residual moderately to poorly differentiated adenocarcinoma, T2N0 (0/26), margin negative.\par \par 11/15/2022: Patient feels well and is tolerating diet without abdominal pain, nausea/emesis.  His weight is stable.\par Surveillance CT chest/abdomen/pelvis 10/24/2022 shows no evidence of recurrent/metastatic disease.

## 2022-11-17 NOTE — ASSESSMENT
[FreeTextEntry1] : Doing well without evidence of disease.\par Follow up in 4-6 months after next CT scan.\par Follow up with GI for endoscopy 07/2023.

## 2022-12-23 ENCOUNTER — OUTPATIENT (OUTPATIENT)
Dept: OUTPATIENT SERVICES | Facility: HOSPITAL | Age: 62
LOS: 1 days | Discharge: HOME | End: 2022-12-23

## 2022-12-23 DIAGNOSIS — Z95.828 PRESENCE OF OTHER VASCULAR IMPLANTS AND GRAFTS: Chronic | ICD-10-CM

## 2022-12-23 DIAGNOSIS — C16.9 MALIGNANT NEOPLASM OF STOMACH, UNSPECIFIED: ICD-10-CM

## 2022-12-23 DIAGNOSIS — Z98.890 OTHER SPECIFIED POSTPROCEDURAL STATES: Chronic | ICD-10-CM

## 2022-12-28 NOTE — PROCEDURE NOTE - NSPERFORMEDBY_GEN_A_CORE
Myself
Detail Level: Zone
Other (Free Text): 75 y/o female presents today 4 months 3 weeks post op facelift, fat transfer, facial peel\\nSurgery 08/02/2022\\Kameron Rolon suggested a peel to neck and face\\n- red spot on chin injected with 5 FU\\n- patient states that her “ breast look funny” . Dr. Rolon explained that her breast skin was very thin and this was the reason she could see the rippling of the implant edges. The scar( capsule was removed) this also is a reason for the rippling. He recommended that the implants be e changed and place a graft to help with additional coverage of the implant.

## 2023-01-03 ENCOUNTER — APPOINTMENT (OUTPATIENT)
Dept: INFUSION THERAPY | Facility: HOSPITAL | Age: 63
End: 2023-01-03

## 2023-01-03 ENCOUNTER — RESULT REVIEW (OUTPATIENT)
Age: 63
End: 2023-01-03

## 2023-01-03 LAB — CEA SERPL-MCNC: 4 NG/ML — HIGH (ref 0–3.8)

## 2023-02-21 ENCOUNTER — OUTPATIENT (OUTPATIENT)
Dept: OUTPATIENT SERVICES | Facility: HOSPITAL | Age: 63
LOS: 1 days | Discharge: HOME | End: 2023-02-21

## 2023-02-21 DIAGNOSIS — Z98.890 OTHER SPECIFIED POSTPROCEDURAL STATES: Chronic | ICD-10-CM

## 2023-02-21 DIAGNOSIS — Z95.828 PRESENCE OF OTHER VASCULAR IMPLANTS AND GRAFTS: Chronic | ICD-10-CM

## 2023-02-21 DIAGNOSIS — C16.9 MALIGNANT NEOPLASM OF STOMACH, UNSPECIFIED: ICD-10-CM

## 2023-03-01 ENCOUNTER — APPOINTMENT (OUTPATIENT)
Dept: HEMATOLOGY ONCOLOGY | Facility: CLINIC | Age: 63
End: 2023-03-01
Payer: COMMERCIAL

## 2023-03-01 ENCOUNTER — APPOINTMENT (OUTPATIENT)
Dept: INFUSION THERAPY | Facility: HOSPITAL | Age: 63
End: 2023-03-01

## 2023-03-01 ENCOUNTER — RESULT REVIEW (OUTPATIENT)
Age: 63
End: 2023-03-01

## 2023-03-01 VITALS
BODY MASS INDEX: 26.03 KG/M2 | WEIGHT: 181.44 LBS | DIASTOLIC BLOOD PRESSURE: 88 MMHG | SYSTOLIC BLOOD PRESSURE: 152 MMHG | HEART RATE: 85 BPM | OXYGEN SATURATION: 96 % | RESPIRATION RATE: 16 BRPM | TEMPERATURE: 97.3 F

## 2023-03-01 DIAGNOSIS — A31.0 PULMONARY MYCOBACTERIAL INFECTION: ICD-10-CM

## 2023-03-01 LAB — CEA SERPL-MCNC: 3.8 NG/ML — SIGNIFICANT CHANGE UP (ref 0–3.8)

## 2023-03-01 PROCEDURE — 99214 OFFICE O/P EST MOD 30 MIN: CPT

## 2023-03-01 NOTE — PHYSICAL EXAM
[Fully active, able to carry on all pre-disease performance without restriction] : Status 0 - Fully active, able to carry on all pre-disease performance without restriction [Normal] : affect appropriate [de-identified] : anicteric  [de-identified] : no jVD  [de-identified] : normal respiratory effort, no audible wheeze [de-identified] : reg rate  [de-identified] : no LE swelling B/L

## 2023-03-01 NOTE — HISTORY OF PRESENT ILLNESS
[Disease: _____________________] : Disease: [unfilled] [T: ___] : T[unfilled] [N: ___] : N[unfilled] [M: ___] : M[unfilled] [AJCC Stage: ____] : AJCC Stage: [unfilled] [de-identified] : Patient is a 61 y/o M with known PMHx of HTN and pre-diabetes who first presented to a GI, Dr. Paul, 9/29/21 c/o abdominal bloating and heart burn symptoms especially at night progressive for ~ 2 year.  He was initially managed conservatively with an ordered H. pylori test (patient never went for testing), trial of PPI and life style modifications. Patient states that he saw his PCP in October 2021 for routine follow up and was noted to have a drop in his Hgb from 13.9 to 12.3.  He was started on OTC iron supplements and repeat labs 1/2022 were improved.  Given the HANANE and the GI symptoms, the patient returned to a GI for re-evaluation. The patient presented to Dr. Juan Antonio Steinberg on 2/20/22 and an endoscopy was performed on 2/21/22.  The EGD showed evidence of reflux esophagitis at the EG-junction, nonactive mild gastritis in the antrum and a large ulcer was seen at incisura.  Biopsies taken at the stomach antrum showed active chronic H. pylori gastritis with intestinal metaplasia.  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  A CT A/P performed on 2/25/22 showed a sequela of small airways inflammatory disease/bronchiolitis within the left lower lobe and to a lesser extent right middle and right lower lobes of the lungs. The stomach was only partially distended but there was no definite gastric mass identified.  There was otherwise no evidence of metastatic disease in the abdomen.  The patient started treatment for H. pylori earlier this week and a colonoscopy was performed 2 days ago with a polyp removed and results pending.  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  No submucosa is present.  A background of gastritis and acute inflammatory exudate is noted [de-identified] : CEA [FreeTextEntry1] : FLOT x 5 --> surgery --> FLOT x 3  [de-identified] : presents in follow up , no acute complaints\par no fever or chills \par no cough\par no abdominal pain \par hasn't seen pulm for MAC

## 2023-04-26 ENCOUNTER — OUTPATIENT (OUTPATIENT)
Dept: OUTPATIENT SERVICES | Facility: HOSPITAL | Age: 63
LOS: 1 days | Discharge: HOME | End: 2023-04-26

## 2023-04-26 DIAGNOSIS — Z98.890 OTHER SPECIFIED POSTPROCEDURAL STATES: Chronic | ICD-10-CM

## 2023-04-26 DIAGNOSIS — Z95.828 PRESENCE OF OTHER VASCULAR IMPLANTS AND GRAFTS: Chronic | ICD-10-CM

## 2023-04-26 DIAGNOSIS — C16.9 MALIGNANT NEOPLASM OF STOMACH, UNSPECIFIED: ICD-10-CM

## 2023-04-28 ENCOUNTER — OUTPATIENT (OUTPATIENT)
Dept: OUTPATIENT SERVICES | Facility: HOSPITAL | Age: 63
LOS: 1 days | End: 2023-04-28
Payer: COMMERCIAL

## 2023-04-28 ENCOUNTER — APPOINTMENT (OUTPATIENT)
Dept: CT IMAGING | Facility: IMAGING CENTER | Age: 63
End: 2023-04-28
Payer: COMMERCIAL

## 2023-04-28 DIAGNOSIS — Z98.890 OTHER SPECIFIED POSTPROCEDURAL STATES: Chronic | ICD-10-CM

## 2023-04-28 DIAGNOSIS — C16.9 MALIGNANT NEOPLASM OF STOMACH, UNSPECIFIED: ICD-10-CM

## 2023-04-28 DIAGNOSIS — Z95.828 PRESENCE OF OTHER VASCULAR IMPLANTS AND GRAFTS: Chronic | ICD-10-CM

## 2023-04-28 PROCEDURE — 71260 CT THORAX DX C+: CPT | Mod: 26

## 2023-04-28 PROCEDURE — 74177 CT ABD & PELVIS W/CONTRAST: CPT

## 2023-04-28 PROCEDURE — 74177 CT ABD & PELVIS W/CONTRAST: CPT | Mod: 26

## 2023-04-28 PROCEDURE — 71260 CT THORAX DX C+: CPT

## 2023-05-03 ENCOUNTER — APPOINTMENT (OUTPATIENT)
Dept: INFUSION THERAPY | Facility: HOSPITAL | Age: 63
End: 2023-05-03

## 2023-05-03 ENCOUNTER — APPOINTMENT (OUTPATIENT)
Dept: HEMATOLOGY ONCOLOGY | Facility: CLINIC | Age: 63
End: 2023-05-03
Payer: COMMERCIAL

## 2023-05-03 VITALS
HEART RATE: 87 BPM | OXYGEN SATURATION: 99 % | BODY MASS INDEX: 25.94 KG/M2 | SYSTOLIC BLOOD PRESSURE: 138 MMHG | WEIGHT: 180.78 LBS | DIASTOLIC BLOOD PRESSURE: 88 MMHG | TEMPERATURE: 97.2 F | RESPIRATION RATE: 16 BRPM

## 2023-05-03 PROCEDURE — 99214 OFFICE O/P EST MOD 30 MIN: CPT

## 2023-05-04 RX ORDER — TIOTROPIUM BROMIDE AND OLODATEROL 3.124; 2.736 UG/1; UG/1
2.5-2.5 SPRAY, METERED RESPIRATORY (INHALATION)
Refills: 0 | Status: ACTIVE | COMMUNITY
Start: 2023-05-04

## 2023-05-04 RX ORDER — DEXAMETHASONE 4 MG/1
4 TABLET ORAL TWICE DAILY
Qty: 8 | Refills: 2 | Status: COMPLETED | COMMUNITY
Start: 2022-04-15 | End: 2023-05-04

## 2023-05-04 RX ORDER — VALSARTAN 80 MG/1
80 TABLET, COATED ORAL DAILY
Refills: 0 | Status: ACTIVE | COMMUNITY
Start: 2022-06-08

## 2023-05-04 NOTE — REVIEW OF SYSTEMS
[Cough] : cough [Negative] : Neurological [Shortness Of Breath] : no shortness of breath [Wheezing] : no wheezing [SOB on Exertion] : no shortness of breath during exertion

## 2023-05-04 NOTE — PHYSICAL EXAM
[Fully active, able to carry on all pre-disease performance without restriction] : Status 0 - Fully active, able to carry on all pre-disease performance without restriction [Normal] : clear to auscultation bilaterally, no dullness, no wheezing [de-identified] : anicteric  [de-identified] : no jVD  [de-identified] : reg rate  [de-identified] : normal respiratory effort, no audible wheeze [de-identified] : no LE swelling B/L [de-identified] : soft, non-tender, non-distended

## 2023-05-04 NOTE — HISTORY OF PRESENT ILLNESS
[Disease: _____________________] : Disease: [unfilled] [T: ___] : T[unfilled] [N: ___] : N[unfilled] [M: ___] : M[unfilled] [AJCC Stage: ____] : AJCC Stage: [unfilled] [de-identified] : Patient is a 63 y/o M with known PMHx of HTN and pre-diabetes who first presented to a GI, Dr. Paul, 9/29/21 c/o abdominal bloating and heart burn symptoms especially at night progressive for ~ 2 year.  He was initially managed conservatively with an ordered H. pylori test (patient never went for testing), trial of PPI and life style modifications. Patient states that he saw his PCP in October 2021 for routine follow up and was noted to have a drop in his Hgb from 13.9 to 12.3.  He was started on OTC iron supplements and repeat labs 1/2022 were improved.  Given the HANANE and the GI symptoms, the patient returned to a GI for re-evaluation. The patient presented to Dr. Juan Antonio Steinberg on 2/20/22 and an endoscopy was performed on 2/21/22.  The EGD showed evidence of reflux esophagitis at the EG-junction, nonactive mild gastritis in the antrum and a large ulcer was seen at incisura.  Biopsies taken at the stomach antrum showed active chronic H. pylori gastritis with intestinal metaplasia.  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  A CT A/P performed on 2/25/22 showed a sequela of small airways inflammatory disease/bronchiolitis within the left lower lobe and to a lesser extent right middle and right lower lobes of the lungs. The stomach was only partially distended but there was no definite gastric mass identified.  There was otherwise no evidence of metastatic disease in the abdomen.  The patient started treatment for H. pylori earlier this week and a colonoscopy was performed 2 days ago with a polyp removed and results pending.  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  No submucosa is present.  A background of gastritis and acute inflammatory exudate is noted [de-identified] : CEA [FreeTextEntry1] : FLOT x 5 --> surgery --> FLOT x 3  [de-identified] : Patient presents for follow up and to review recent imaging.  He reports a chronic cough for which he has been following with his pulmonologist, Dr. Rickey Keith.  He states that cough is productive with white sputum.  He states that he saw Dr. Keith last week on 4/28 and was prescribing a new inhaler that patient has started to use.  He states that he brought a copy of the CD with the CT imaging to the pulmonologist and he was able to review the scan with him.  He denies weight loss, fever, SOB, pleuritic CP, MONCADA or hemoptysis.  He states that other than the cough he feels well.  He has been eating well and has good energy.  He denies abdominal pain, N/V, change in bowel habits or melena. His previous neuropathy has resolved.

## 2023-06-25 ENCOUNTER — OUTPATIENT (OUTPATIENT)
Dept: OUTPATIENT SERVICES | Facility: HOSPITAL | Age: 63
LOS: 1 days | Discharge: HOME | End: 2023-06-25

## 2023-06-25 DIAGNOSIS — Z95.828 PRESENCE OF OTHER VASCULAR IMPLANTS AND GRAFTS: Chronic | ICD-10-CM

## 2023-06-25 DIAGNOSIS — C16.9 MALIGNANT NEOPLASM OF STOMACH, UNSPECIFIED: ICD-10-CM

## 2023-06-25 DIAGNOSIS — Z98.890 OTHER SPECIFIED POSTPROCEDURAL STATES: Chronic | ICD-10-CM

## 2023-07-03 ENCOUNTER — RESULT REVIEW (OUTPATIENT)
Age: 63
End: 2023-07-03

## 2023-07-03 ENCOUNTER — APPOINTMENT (OUTPATIENT)
Dept: INFUSION THERAPY | Facility: HOSPITAL | Age: 63
End: 2023-07-03

## 2023-07-03 LAB — CEA SERPL-MCNC: 3.6 NG/ML — SIGNIFICANT CHANGE UP (ref 0–3.8)

## 2023-07-17 NOTE — ASU DISCHARGE PLAN (ADULT/PEDIATRIC) - NS MD DC FALL RISK RISK
[address] [facility]  [address] [facility] [place] [facility].     [phone]  F: [phone]   [] [address] [facility]  [address]   [address]  P: [phone]  F: [phone] [] Gracy Orozco  P: [phone]  F: [phone]  [x] [address] [facility]  [address]  [place]: [phone]   F: [phone]     Occupational Therapy Daily Treatment Note    Date:  2023  Patient Name:  Halina Haider    :  1943  MRN: [id number]  Referring Provider:  Andrew Solis  Insurance: Medicare  Medical Diagnosis:   M62.81 (ICD-10-CM) - Muscle weakness (generalized)   S06. 5XAA (ICD-10-CM) - Traumatic subdural hemorrhage with loss of consciousness status unknown, initial encounter   Z98.890 (ICD-10-CM) - Other specified postprocedural states                       Rehab Codes: numbness R20.2,, fine motor skills loss R29.818,, or pain in left hand M79.642,  Onset Date: 2023                          Next  [address] [place] [address]: unknown  Visit# / total visits: 3/12; Progress note for Medicare patient due at visit 10    Cancels/No Shows: 0      Subjective:    Pain:  [x] Yes  [] No Location: left middle/ring finger Pain Rating: (0-10 scale) 2/10  Pain altered Tx:  [x] No  [] Yes  Action:  Pt Comments:pain in middle/ring finger down through palm. Working on [facility] activities at home. Stopped putty because it causes more pain    Objective:  Modalities:  Precautions:  Exercises:  EXERCISE    REPS/     TIME  WEIGHT/    LEVEL COMMENTS    HEP   [facility] activity list     ROM/stretch   Left digits/hand - focus on extension    WB   Through B hands standing at table to stretch L in full ext    Velcro board   L hand - middle, ring, little    Graded clips  Yellow-black  Not this date   Keyhole peg board 25  Manipulation of pegs to place in board For information on Fall & Injury Prevention, visit: [email or web address]/[email or web address]-mobility OR  [email or web address]/news/[email or web address]-[email or web address]-[email or web address]-avoid-injury OR  https://www.cdc.gov/steadi/patient.html

## 2023-08-08 ENCOUNTER — APPOINTMENT (OUTPATIENT)
Dept: SURGICAL ONCOLOGY | Facility: CLINIC | Age: 63
End: 2023-08-08
Payer: COMMERCIAL

## 2023-08-08 VITALS
DIASTOLIC BLOOD PRESSURE: 82 MMHG | WEIGHT: 176 LBS | HEIGHT: 70 IN | BODY MASS INDEX: 25.2 KG/M2 | OXYGEN SATURATION: 99 % | RESPIRATION RATE: 16 BRPM | SYSTOLIC BLOOD PRESSURE: 129 MMHG | HEART RATE: 77 BPM

## 2023-08-08 PROCEDURE — 99213 OFFICE O/P EST LOW 20 MIN: CPT

## 2023-08-09 NOTE — PHYSICAL EXAM
[FreeTextEntry1] : Abdomen: soft, nontender/nondistended.  Incisions well healed. [Normal] : supple, no neck mass and thyroid not enlarged [Normal Neck Lymph Nodes] : normal neck lymph nodes  [Normal Supraclavicular Lymph Nodes] : normal supraclavicular lymph nodes [Normal Groin Lymph Nodes] : normal groin lymph nodes [Normal Axillary Lymph Nodes] : normal axillary lymph nodes [Normal] : oriented to person, place and time, with appropriate affect [de-identified] : well healed laparoscopy incisions.

## 2023-08-09 NOTE — HISTORY OF PRESENT ILLNESS
[de-identified] : Mr. Burden is a 61 y/o male wth history of dyspepsia/acid reflux.  He was recently found to be slightly anemic on routine blood work.  He underwent his first EGD by Dr. Juan Antonio Steinberg 02/21/2022 which demonstrated a large gastric ulcer at the incisura. 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 CT abdomen 02/25/2022 shows no evidence of distant/regional metastasis, gastric lesion is not visualized. Patient reports feeling well.  06/23/2022: Patient is currently on cycle 5 of FLOT.  Overall he is tolerating well.  He is eating and maintaining well.  He denies abdominal pain. Restaging CT chest/abdomen/pelvis 06/16/2022 does not show evidence of gastric cancer progression.  08/11/2022: Patient is s/p robotic distal gastrectomy with modified D2 lymphadenectomy 07/25/2022.  He recovered well and was discharged home on POD#03.  He currently feels well and denies abdominal pain.  He is tolerating diet without nausea/emesis. Pathology: residual moderately to poorly differentiated adenocarcinoma, T2N0 (0/26), margin negative.  11/15/2022: Patient feels well and is tolerating diet without abdominal pain, nausea/emesis.  His weight is stable. Surveillance CT chest/abdomen/pelvis 10/24/2022 shows no evidence of recurrent/metastatic disease.  08/08/2023: Patient reports doing well.  He is tolerating diet without abdominal pain.  His weight is stable. Surveillance CT chest/abdomen/pelvis 04/23/2023 shows no evidence of recurrent/metastatic disease.  He has not had surveillance EGD after gastrectomy. CEA level is normal.

## 2023-08-09 NOTE — ASSESSMENT
[FreeTextEntry1] : Doing well/ULICES. Continue surveillance CT scan - expect next imaging 10/2023. Patient instructed to follow up with GI for EGD as he is one year after resection. Follow up in office in 6 months.

## 2023-08-29 ENCOUNTER — OUTPATIENT (OUTPATIENT)
Dept: OUTPATIENT SERVICES | Facility: HOSPITAL | Age: 63
LOS: 1 days | Discharge: HOME | End: 2023-08-29

## 2023-08-29 DIAGNOSIS — C16.9 MALIGNANT NEOPLASM OF STOMACH, UNSPECIFIED: ICD-10-CM

## 2023-08-29 DIAGNOSIS — Z98.890 OTHER SPECIFIED POSTPROCEDURAL STATES: Chronic | ICD-10-CM

## 2023-08-29 DIAGNOSIS — Z95.828 PRESENCE OF OTHER VASCULAR IMPLANTS AND GRAFTS: Chronic | ICD-10-CM

## 2023-09-06 ENCOUNTER — APPOINTMENT (OUTPATIENT)
Dept: INFUSION THERAPY | Facility: HOSPITAL | Age: 63
End: 2023-09-06

## 2023-09-12 ENCOUNTER — RESULT REVIEW (OUTPATIENT)
Age: 63
End: 2023-09-12

## 2023-09-12 ENCOUNTER — APPOINTMENT (OUTPATIENT)
Dept: GASTROENTEROLOGY | Facility: HOSPITAL | Age: 63
End: 2023-09-12

## 2023-09-12 ENCOUNTER — TRANSCRIPTION ENCOUNTER (OUTPATIENT)
Age: 63
End: 2023-09-12

## 2023-09-12 ENCOUNTER — OUTPATIENT (OUTPATIENT)
Dept: OUTPATIENT SERVICES | Facility: HOSPITAL | Age: 63
LOS: 1 days | End: 2023-09-12
Payer: COMMERCIAL

## 2023-09-12 VITALS
OXYGEN SATURATION: 100 % | DIASTOLIC BLOOD PRESSURE: 81 MMHG | RESPIRATION RATE: 17 BRPM | SYSTOLIC BLOOD PRESSURE: 134 MMHG | HEART RATE: 66 BPM

## 2023-09-12 VITALS
SYSTOLIC BLOOD PRESSURE: 170 MMHG | OXYGEN SATURATION: 100 % | DIASTOLIC BLOOD PRESSURE: 88 MMHG | HEIGHT: 70 IN | TEMPERATURE: 98 F | RESPIRATION RATE: 19 BRPM | WEIGHT: 177.91 LBS | HEART RATE: 64 BPM

## 2023-09-12 DIAGNOSIS — Z95.828 PRESENCE OF OTHER VASCULAR IMPLANTS AND GRAFTS: Chronic | ICD-10-CM

## 2023-09-12 DIAGNOSIS — Z98.890 OTHER SPECIFIED POSTPROCEDURAL STATES: Chronic | ICD-10-CM

## 2023-09-12 DIAGNOSIS — C16.9 MALIGNANT NEOPLASM OF STOMACH, UNSPECIFIED: ICD-10-CM

## 2023-09-12 PROCEDURE — 43239 EGD BIOPSY SINGLE/MULTIPLE: CPT

## 2023-09-12 PROCEDURE — 88342 IMHCHEM/IMCYTCHM 1ST ANTB: CPT

## 2023-09-12 PROCEDURE — 88305 TISSUE EXAM BY PATHOLOGIST: CPT | Mod: 26

## 2023-09-12 PROCEDURE — 88342 IMHCHEM/IMCYTCHM 1ST ANTB: CPT | Mod: 26

## 2023-09-12 PROCEDURE — 88305 TISSUE EXAM BY PATHOLOGIST: CPT

## 2023-09-12 RX ORDER — SODIUM CHLORIDE 9 MG/ML
500 INJECTION INTRAMUSCULAR; INTRAVENOUS; SUBCUTANEOUS
Refills: 0 | Status: COMPLETED | OUTPATIENT
Start: 2023-09-12 | End: 2023-09-12

## 2023-09-12 RX ADMIN — SODIUM CHLORIDE 75 MILLILITER(S): 9 INJECTION INTRAMUSCULAR; INTRAVENOUS; SUBCUTANEOUS at 09:09

## 2023-09-12 NOTE — PRE PROCEDURE NOTE - PRE PROCEDURE EVALUATION
Attending Physician: Tyrone                          Procedure: EGD +/- EUS    Indication for Procedure: gastric adenocarcinoma surveillance  ________________________________________________________  PAST MEDICAL & SURGICAL HISTORY:  Hypertension      GERD (gastroesophageal reflux disease)      Gastritis, Helicobacter pylori      Adenocarcinoma of stomach      Lung nodule      H/O colonoscopy      S/P endoscopy      Port-A-Cath in place      History of lung surgery  RLL wedge resection 4/13/22        ALLERGIES:  penicillins (Unknown)    HOME MEDICATIONS:  acetaminophen 500 mg oral tablet: 2 tab(s) orally every 6 hours  amLODIPine 2.5 mg oral tablet: 1 tab(s) orally once a day  valsartan 160 mg oral tablet: 1 tab(s) orally once a day    AICD/PPM: [ ] yes   [ ] no    PERTINENT LAB DATA:                      PHYSICAL EXAMINATION:    Height (cm): 177.8  Weight (kg): 80.7  BMI (kg/m2): 25.5  BSA (m2): 1.99T(C): 36.4  HR: 64  BP: 170/88  RR: 19  SpO2: 100%    Constitutional: NAD  HEENT: PERRLA, EOMI,    Neck:  No JVD  Respiratory: CTAB/L  Cardiovascular: S1 and S2  Gastrointestinal: BS+, soft, NT/ND  Extremities: No peripheral edema  Neurological: A/O x 3, no focal deficits  Psychiatric: Normal mood, normal affect  Skin: No rashes    ASA Class: I [ ]  II [ ]  III [ ]  IV [ ]    COMMENTS:    The patient is a suitable candidate for the planned procedure unless box checked [ ]  No, explain:

## 2023-09-12 NOTE — ASU DISCHARGE PLAN (ADULT/PEDIATRIC) - NS MD DC FALL RISK RISK
For information on Fall & Injury Prevention, visit: [email or web address]/[email or web address] OR  [email or web address]://[email or web address].[facility]/[email or web address] OR  https://www.cdc.gov/steadi/patient.html

## 2023-09-25 LAB — SURGICAL PATHOLOGY STUDY: SIGNIFICANT CHANGE UP

## 2023-09-27 ENCOUNTER — TRANSCRIPTION ENCOUNTER (OUTPATIENT)
Age: 63
End: 2023-09-27

## 2023-11-03 ENCOUNTER — RESULT REVIEW (OUTPATIENT)
Age: 63
End: 2023-11-03

## 2023-11-05 ENCOUNTER — OUTPATIENT (OUTPATIENT)
Dept: OUTPATIENT SERVICES | Facility: HOSPITAL | Age: 63
LOS: 1 days | Discharge: HOME | End: 2023-11-05

## 2023-11-05 DIAGNOSIS — Z98.890 OTHER SPECIFIED POSTPROCEDURAL STATES: Chronic | ICD-10-CM

## 2023-11-05 DIAGNOSIS — C16.9 MALIGNANT NEOPLASM OF STOMACH, UNSPECIFIED: ICD-10-CM

## 2023-11-05 DIAGNOSIS — Z95.828 PRESENCE OF OTHER VASCULAR IMPLANTS AND GRAFTS: Chronic | ICD-10-CM

## 2023-11-07 ENCOUNTER — OUTPATIENT (OUTPATIENT)
Dept: OUTPATIENT SERVICES | Facility: HOSPITAL | Age: 63
LOS: 1 days | End: 2023-11-07
Payer: MEDICAID

## 2023-11-07 ENCOUNTER — APPOINTMENT (OUTPATIENT)
Dept: CT IMAGING | Facility: IMAGING CENTER | Age: 63
End: 2023-11-07
Payer: MEDICAID

## 2023-11-07 ENCOUNTER — NON-APPOINTMENT (OUTPATIENT)
Age: 63
End: 2023-11-07

## 2023-11-07 DIAGNOSIS — Z98.890 OTHER SPECIFIED POSTPROCEDURAL STATES: Chronic | ICD-10-CM

## 2023-11-07 DIAGNOSIS — C16.9 MALIGNANT NEOPLASM OF STOMACH, UNSPECIFIED: ICD-10-CM

## 2023-11-07 DIAGNOSIS — Z95.828 PRESENCE OF OTHER VASCULAR IMPLANTS AND GRAFTS: Chronic | ICD-10-CM

## 2023-11-07 PROCEDURE — 71260 CT THORAX DX C+: CPT | Mod: 26

## 2023-11-07 PROCEDURE — 74177 CT ABD & PELVIS W/CONTRAST: CPT | Mod: 26

## 2023-11-07 PROCEDURE — 71260 CT THORAX DX C+: CPT

## 2023-11-07 PROCEDURE — 74177 CT ABD & PELVIS W/CONTRAST: CPT

## 2023-11-08 ENCOUNTER — APPOINTMENT (OUTPATIENT)
Dept: HEMATOLOGY ONCOLOGY | Facility: CLINIC | Age: 63
End: 2023-11-08
Payer: MEDICAID

## 2023-11-08 VITALS
OXYGEN SATURATION: 99 % | HEART RATE: 83 BPM | RESPIRATION RATE: 16 BRPM | BODY MASS INDEX: 25.79 KG/M2 | TEMPERATURE: 97.2 F | SYSTOLIC BLOOD PRESSURE: 132 MMHG | WEIGHT: 180.11 LBS | DIASTOLIC BLOOD PRESSURE: 80 MMHG | HEIGHT: 69.96 IN

## 2023-11-08 DIAGNOSIS — Z01.812 ENCOUNTER FOR PREPROCEDURAL LABORATORY EXAMINATION: ICD-10-CM

## 2023-11-08 PROCEDURE — 99214 OFFICE O/P EST MOD 30 MIN: CPT

## 2023-11-08 RX ORDER — PROCHLORPERAZINE MALEATE 10 MG/1
10 TABLET ORAL EVERY 6 HOURS
Qty: 30 | Refills: 1 | Status: DISCONTINUED | COMMUNITY
Start: 2022-04-15 | End: 2023-11-08

## 2023-11-09 ENCOUNTER — RESULT REVIEW (OUTPATIENT)
Age: 63
End: 2023-11-09

## 2023-11-09 ENCOUNTER — APPOINTMENT (OUTPATIENT)
Dept: INFUSION THERAPY | Facility: HOSPITAL | Age: 63
End: 2023-11-09

## 2023-11-09 LAB
BASOPHILS # BLD AUTO: 0.08 K/UL — SIGNIFICANT CHANGE UP (ref 0–0.2)
BASOPHILS # BLD AUTO: 0.08 K/UL — SIGNIFICANT CHANGE UP (ref 0–0.2)
BASOPHILS NFR BLD AUTO: 1.3 % — SIGNIFICANT CHANGE UP (ref 0–2)
BASOPHILS NFR BLD AUTO: 1.3 % — SIGNIFICANT CHANGE UP (ref 0–2)
CEA SERPL-MCNC: 3.1 NG/ML — SIGNIFICANT CHANGE UP (ref 0–3.8)
CEA SERPL-MCNC: 3.1 NG/ML — SIGNIFICANT CHANGE UP (ref 0–3.8)
EOSINOPHIL # BLD AUTO: 0.26 K/UL — SIGNIFICANT CHANGE UP (ref 0–0.5)
EOSINOPHIL # BLD AUTO: 0.26 K/UL — SIGNIFICANT CHANGE UP (ref 0–0.5)
EOSINOPHIL NFR BLD AUTO: 4.4 % — SIGNIFICANT CHANGE UP (ref 0–6)
EOSINOPHIL NFR BLD AUTO: 4.4 % — SIGNIFICANT CHANGE UP (ref 0–6)
FERRITIN SERPL-MCNC: 9 NG/ML — LOW (ref 30–400)
FERRITIN SERPL-MCNC: 9 NG/ML — LOW (ref 30–400)
HCT VFR BLD CALC: 29.7 % — LOW (ref 39–50)
HCT VFR BLD CALC: 29.7 % — LOW (ref 39–50)
HGB BLD-MCNC: 9.2 G/DL — LOW (ref 13–17)
HGB BLD-MCNC: 9.2 G/DL — LOW (ref 13–17)
IMM GRANULOCYTES NFR BLD AUTO: 0.2 % — SIGNIFICANT CHANGE UP (ref 0–0.9)
IMM GRANULOCYTES NFR BLD AUTO: 0.2 % — SIGNIFICANT CHANGE UP (ref 0–0.9)
IRON SATN MFR SERPL: 133 UG/DL — SIGNIFICANT CHANGE UP (ref 45–165)
IRON SATN MFR SERPL: 133 UG/DL — SIGNIFICANT CHANGE UP (ref 45–165)
IRON SATN MFR SERPL: 26 % — SIGNIFICANT CHANGE UP (ref 16–55)
IRON SATN MFR SERPL: 26 % — SIGNIFICANT CHANGE UP (ref 16–55)
LYMPHOCYTES # BLD AUTO: 1.46 K/UL — SIGNIFICANT CHANGE UP (ref 1–3.3)
LYMPHOCYTES # BLD AUTO: 1.46 K/UL — SIGNIFICANT CHANGE UP (ref 1–3.3)
LYMPHOCYTES # BLD AUTO: 24.5 % — SIGNIFICANT CHANGE UP (ref 13–44)
LYMPHOCYTES # BLD AUTO: 24.5 % — SIGNIFICANT CHANGE UP (ref 13–44)
MCHC RBC-ENTMCNC: 23.5 PG — LOW (ref 27–34)
MCHC RBC-ENTMCNC: 23.5 PG — LOW (ref 27–34)
MCHC RBC-ENTMCNC: 31 G/DL — LOW (ref 32–36)
MCHC RBC-ENTMCNC: 31 G/DL — LOW (ref 32–36)
MCV RBC AUTO: 75.8 FL — LOW (ref 80–100)
MCV RBC AUTO: 75.8 FL — LOW (ref 80–100)
MONOCYTES # BLD AUTO: 0.54 K/UL — SIGNIFICANT CHANGE UP (ref 0–0.9)
MONOCYTES # BLD AUTO: 0.54 K/UL — SIGNIFICANT CHANGE UP (ref 0–0.9)
MONOCYTES NFR BLD AUTO: 9.1 % — SIGNIFICANT CHANGE UP (ref 2–14)
MONOCYTES NFR BLD AUTO: 9.1 % — SIGNIFICANT CHANGE UP (ref 2–14)
NEUTROPHILS # BLD AUTO: 3.61 K/UL — SIGNIFICANT CHANGE UP (ref 1.8–7.4)
NEUTROPHILS # BLD AUTO: 3.61 K/UL — SIGNIFICANT CHANGE UP (ref 1.8–7.4)
NEUTROPHILS NFR BLD AUTO: 60.5 % — SIGNIFICANT CHANGE UP (ref 43–77)
NEUTROPHILS NFR BLD AUTO: 60.5 % — SIGNIFICANT CHANGE UP (ref 43–77)
NRBC # BLD: 0 /100 WBCS — SIGNIFICANT CHANGE UP (ref 0–0)
NRBC # BLD: 0 /100 WBCS — SIGNIFICANT CHANGE UP (ref 0–0)
PLATELET # BLD AUTO: 308 K/UL — SIGNIFICANT CHANGE UP (ref 150–400)
PLATELET # BLD AUTO: 308 K/UL — SIGNIFICANT CHANGE UP (ref 150–400)
RBC # BLD: 3.92 M/UL — LOW (ref 4.2–5.8)
RBC # BLD: 3.92 M/UL — LOW (ref 4.2–5.8)
RBC # FLD: 16.6 % — HIGH (ref 10.3–14.5)
RBC # FLD: 16.6 % — HIGH (ref 10.3–14.5)
TIBC SERPL-MCNC: 505 UG/DL — HIGH (ref 220–430)
TIBC SERPL-MCNC: 505 UG/DL — HIGH (ref 220–430)
UIBC SERPL-MCNC: 372 UG/DL — HIGH (ref 110–370)
UIBC SERPL-MCNC: 372 UG/DL — HIGH (ref 110–370)
WBC # BLD: 5.96 K/UL — SIGNIFICANT CHANGE UP (ref 3.8–10.5)
WBC # BLD: 5.96 K/UL — SIGNIFICANT CHANGE UP (ref 3.8–10.5)
WBC # FLD AUTO: 5.96 K/UL — SIGNIFICANT CHANGE UP (ref 3.8–10.5)
WBC # FLD AUTO: 5.96 K/UL — SIGNIFICANT CHANGE UP (ref 3.8–10.5)

## 2023-11-17 ENCOUNTER — TRANSCRIPTION ENCOUNTER (OUTPATIENT)
Age: 63
End: 2023-11-17

## 2023-11-28 ENCOUNTER — LABORATORY RESULT (OUTPATIENT)
Age: 63
End: 2023-11-28

## 2023-11-28 LAB
BASOPHILS # BLD AUTO: 0.12 K/UL
BASOPHILS NFR BLD AUTO: 1.8 %
EOSINOPHIL # BLD AUTO: 0.3 K/UL
EOSINOPHIL NFR BLD AUTO: 4.5 %
FERRITIN SERPL-MCNC: 21 NG/ML
HCT VFR BLD CALC: 38.1 %
HGB BLD-MCNC: 10.9 G/DL
IMM GRANULOCYTES NFR BLD AUTO: 0.3 %
IRON SATN MFR SERPL: 4 %
IRON SERPL-MCNC: 23 UG/DL
LYMPHOCYTES # BLD AUTO: 1.87 K/UL
LYMPHOCYTES NFR BLD AUTO: 28 %
MAN DIFF?: NORMAL
MCHC RBC-ENTMCNC: 23.3 PG
MCHC RBC-ENTMCNC: 28.6 GM/DL
MCV RBC AUTO: 81.4 FL
MONOCYTES # BLD AUTO: 0.47 K/UL
MONOCYTES NFR BLD AUTO: 7 %
NEUTROPHILS # BLD AUTO: 3.89 K/UL
NEUTROPHILS NFR BLD AUTO: 58.4 %
PLATELET # BLD AUTO: 332 K/UL
RBC # BLD: 4.68 M/UL
RBC # FLD: 21.6 %
TIBC SERPL-MCNC: 533 UG/DL
UIBC SERPL-MCNC: 511 UG/DL
WBC # FLD AUTO: 6.67 K/UL

## 2023-11-28 RX ORDER — SODIUM SULFATE, POTASSIUM SULFATE AND MAGNESIUM SULFATE 1.6; 3.13; 17.5 G/177ML; G/177ML; G/177ML
17.5-3.13-1.6 SOLUTION ORAL
Qty: 1 | Refills: 0 | Status: ACTIVE | COMMUNITY
Start: 2023-11-17 | End: 1900-01-01

## 2023-12-07 ENCOUNTER — RESULT REVIEW (OUTPATIENT)
Age: 63
End: 2023-12-07

## 2023-12-07 ENCOUNTER — OUTPATIENT (OUTPATIENT)
Dept: OUTPATIENT SERVICES | Facility: HOSPITAL | Age: 63
LOS: 1 days | End: 2023-12-07
Payer: MEDICAID

## 2023-12-07 ENCOUNTER — APPOINTMENT (OUTPATIENT)
Dept: GASTROENTEROLOGY | Facility: HOSPITAL | Age: 63
End: 2023-12-07

## 2023-12-07 ENCOUNTER — TRANSCRIPTION ENCOUNTER (OUTPATIENT)
Age: 63
End: 2023-12-07

## 2023-12-07 VITALS
RESPIRATION RATE: 14 BRPM | HEART RATE: 57 BPM | DIASTOLIC BLOOD PRESSURE: 82 MMHG | OXYGEN SATURATION: 100 % | SYSTOLIC BLOOD PRESSURE: 150 MMHG

## 2023-12-07 VITALS
TEMPERATURE: 98 F | SYSTOLIC BLOOD PRESSURE: 153 MMHG | OXYGEN SATURATION: 100 % | HEIGHT: 70 IN | RESPIRATION RATE: 15 BRPM | HEART RATE: 66 BPM | DIASTOLIC BLOOD PRESSURE: 80 MMHG | WEIGHT: 179.9 LBS

## 2023-12-07 DIAGNOSIS — Z98.890 OTHER SPECIFIED POSTPROCEDURAL STATES: Chronic | ICD-10-CM

## 2023-12-07 DIAGNOSIS — D50.9 IRON DEFICIENCY ANEMIA, UNSPECIFIED: ICD-10-CM

## 2023-12-07 DIAGNOSIS — Z95.828 PRESENCE OF OTHER VASCULAR IMPLANTS AND GRAFTS: Chronic | ICD-10-CM

## 2023-12-07 PROCEDURE — 88305 TISSUE EXAM BY PATHOLOGIST: CPT | Mod: 26

## 2023-12-07 PROCEDURE — 45380 COLONOSCOPY AND BIOPSY: CPT

## 2023-12-07 PROCEDURE — 45380 COLONOSCOPY AND BIOPSY: CPT | Mod: GC

## 2023-12-07 PROCEDURE — 88305 TISSUE EXAM BY PATHOLOGIST: CPT

## 2023-12-07 DEVICE — NET RETRV ROT ROTH 2.5MMX230CM: Type: IMPLANTABLE DEVICE | Status: FUNCTIONAL

## 2023-12-07 RX ORDER — SODIUM CHLORIDE 9 MG/ML
500 INJECTION INTRAMUSCULAR; INTRAVENOUS; SUBCUTANEOUS
Refills: 0 | Status: DISCONTINUED | OUTPATIENT
Start: 2023-12-07 | End: 2023-12-21

## 2023-12-07 NOTE — ASU DISCHARGE PLAN (ADULT/PEDIATRIC) - NS MD DC FALL RISK RISK
For information on Fall & Injury Prevention, visit: [email or web address]://www.[facility]/[email or web address]-mobility OR  https://www.[facility]/[email or web address]-[email or web address]-tips-[email or web address]-avoid-injury OR  https://www.cdc.gov/steadi/patient.html For information on Fall & Injury Prevention, visit: https://www.[facility]/news/fall-prevention-protects-and-maintains-health-[email or web address]-mobility OR  https://www.[facility]/news/fall-prevention-tips-to-avoid-injury OR  https://www.cdc.gov/steadi/patient.html

## 2023-12-07 NOTE — ASU PATIENT PROFILE, ADULT - FALL HARM RISK - UNIVERSAL INTERVENTIONS
Bed in lowest position, wheels locked, appropriate side rails in place/Call bell, personal items and telephone in reach/Instruct patient to call for assistance before getting out of bed or chair/Non-slip footwear when patient is out of bed/[place] to call system/Physically safe environment - no spills, clutter or unnecessary equipment/Purposeful Proactive Rounding/Room/bathroom lighting operational, light cord in reach Bed in lowest position, wheels locked, appropriate side rails in place/Call bell, personal items and telephone in reach/Instruct patient to call for assistance before getting out of bed or chair/Non-slip footwear when patient is out of bed/[place] to call system/Physically safe environment - no spills, clutter or unnecessary equipment/Purposeful Proactive Rounding/Room/bathroom lighting operational, light cord in reach

## 2023-12-07 NOTE — ASU PATIENT PROFILE, ADULT - NSICDXPASTSURGICALHX_GEN_ALL_CORE_FT
PAST SURGICAL HISTORY:  H/O colonoscopy     H/O colonoscopy     History of lung surgery RLL wedge resection 4/13/22    Port-A-Cath in place     S/P endoscopy

## 2023-12-07 NOTE — PRE PROCEDURE NOTE - PRE PROCEDURE EVALUATION
Attending Physician:   Tyrone                         Procedure: colonoscopy    Indication for Procedure: HANANE  ________________________________________________________  PAST MEDICAL & SURGICAL HISTORY:  Hypertension      GERD (gastroesophageal reflux disease)      Gastritis, Helicobacter pylori      Adenocarcinoma of stomach      Lung nodule      H/O colonoscopy      S/P endoscopy      Port-A-Cath in place      History of lung surgery  RLL wedge resection 4/13/22      H/O colonoscopy        ALLERGIES:  penicillins (Unknown)    HOME MEDICATIONS:  acetaminophen 500 mg oral tablet: 2 tab(s) orally every 6 hours  amLODIPine 2.5 mg oral tablet: 1 tab(s) orally once a day  valsartan 160 mg oral tablet: 1 tab(s) orally once a day    AICD/PPM: [ ] yes   [ ] no    PERTINENT LAB DATA:                      PHYSICAL EXAMINATION:    Height (cm): 177.8  Weight (kg): 81.6  BMI (kg/m2): 25.8  BSA (m2): 2T(C): 36.4  HR: 66  BP: 153/80  RR: 15  SpO2: 100%    Constitutional: NAD  HEENT: PERRLA, EOMI,    Neck:  No JVD  Respiratory: CTAB/L  Cardiovascular: S1 and S2  Gastrointestinal: BS+, soft, NT/ND  Extremities: No peripheral edema  Neurological: A/O x 3, no focal deficits  Psychiatric: Normal mood, normal affect  Skin: No rashes    ASA Class: I [ ]  II [ ]  III [ ]  IV [ ]    COMMENTS:    The patient is a suitable candidate for the planned procedure unless box checked [ ]  No, explain:

## 2023-12-07 NOTE — ASU DISCHARGE PLAN (ADULT/PEDIATRIC) - NSDISCH_COLONOSCOPY_ENDO_ALL_CORE_FT
October 6, 2022     Patient: Ezequiel Vargas   YOB: 2013   Date of Visit: 10/6/2022       To Whom it May Concern:    Ezequiel Vargas was seen in my clinic on 10/6/2022  He is excused from school 10/06/2022    If you have any questions or concerns, please don't hesitate to call           Sincerely,          Nikki Ricci PA-C        CC: No Recipients
If a colonoscopy was performed you might notice a few drops of blood on your underwear or you might see blood on the toilet paper after you use the bathroom. This is caused by irritation to the bowel during the procedure and is not a problem. However if you have any more heavy bleeding (more than 2 tablespoons of bright red blood) contact your doctor right away.

## 2023-12-12 LAB
SURGICAL PATHOLOGY STUDY: SIGNIFICANT CHANGE UP
SURGICAL PATHOLOGY STUDY: SIGNIFICANT CHANGE UP

## 2023-12-13 ENCOUNTER — TRANSCRIPTION ENCOUNTER (OUTPATIENT)
Age: 63
End: 2023-12-13

## 2023-12-15 ENCOUNTER — TRANSCRIPTION ENCOUNTER (OUTPATIENT)
Age: 63
End: 2023-12-15

## 2024-01-03 ENCOUNTER — APPOINTMENT (OUTPATIENT)
Dept: GASTROENTEROLOGY | Facility: CLINIC | Age: 64
End: 2024-01-03
Payer: MEDICAID

## 2024-01-03 VITALS — BODY MASS INDEX: 26.58 KG/M2 | WEIGHT: 180 LBS

## 2024-01-03 VITALS
HEIGHT: 69 IN | HEART RATE: 83 BPM | DIASTOLIC BLOOD PRESSURE: 80 MMHG | SYSTOLIC BLOOD PRESSURE: 132 MMHG | WEIGHT: 180 LBS | BODY MASS INDEX: 26.66 KG/M2 | OXYGEN SATURATION: 99 %

## 2024-01-03 PROCEDURE — 91110 GI TRC IMG INTRAL ESOPH-ILE: CPT

## 2024-01-03 NOTE — ASSESSMENT
[FreeTextEntry1] : Reason for visit. Patient is being seen for a capsule procedure study  Procedure note:   This is a pleasant 63-year-old male being seen for a procedure visit for small bowel capsule endoscopy to evaluate for source of anemia.   I have reviewed the risks, benefits, and alternatives of small bowel capsule endoscopy with the patient in detail. Risks include missed lesions, as well as capsule retention that could possibly result in bowel obstruction and necessitate surgical removal. Informed written consent was obtained prior to ingestion of the pill cam. The patient ingested the small bowel capsule without difficulty. He tolerated the procedure well without immediate complications. Real time video shows capsule in the stomach. The patient was instructed to contact the office with any questions or concerns.   Informed the patient that if he requires an MRI within 1 month that he will need an abdominal x-ray to confirm capsule exit.   Post capsule ingestion instructions were provided to the patient.

## 2024-01-03 NOTE — REASON FOR VISIT
[Procedure: _________] : a [unfilled] procedure visit [Spouse] : spouse [FreeTextEntry1] : Capsule Endoscopy

## 2024-01-12 ENCOUNTER — TRANSCRIPTION ENCOUNTER (OUTPATIENT)
Age: 64
End: 2024-01-12

## 2024-01-17 ENCOUNTER — OUTPATIENT (OUTPATIENT)
Dept: OUTPATIENT SERVICES | Facility: HOSPITAL | Age: 64
LOS: 1 days | Discharge: HOME | End: 2024-01-17

## 2024-01-17 ENCOUNTER — TRANSCRIPTION ENCOUNTER (OUTPATIENT)
Age: 64
End: 2024-01-17

## 2024-01-17 DIAGNOSIS — C16.9 MALIGNANT NEOPLASM OF STOMACH, UNSPECIFIED: ICD-10-CM

## 2024-01-17 DIAGNOSIS — Z98.890 OTHER SPECIFIED POSTPROCEDURAL STATES: Chronic | ICD-10-CM

## 2024-01-17 DIAGNOSIS — Z95.828 PRESENCE OF OTHER VASCULAR IMPLANTS AND GRAFTS: Chronic | ICD-10-CM

## 2024-01-18 ENCOUNTER — RESULT REVIEW (OUTPATIENT)
Age: 64
End: 2024-01-18

## 2024-01-18 ENCOUNTER — APPOINTMENT (OUTPATIENT)
Dept: INFUSION THERAPY | Facility: HOSPITAL | Age: 64
End: 2024-01-18

## 2024-01-18 LAB
ALBUMIN SERPL ELPH-MCNC: 4.4 G/DL — SIGNIFICANT CHANGE UP (ref 3.3–5)
ALP SERPL-CCNC: 75 U/L — SIGNIFICANT CHANGE UP (ref 40–120)
ALT FLD-CCNC: 11 U/L — SIGNIFICANT CHANGE UP (ref 10–45)
ANION GAP SERPL CALC-SCNC: 12 MMOL/L — SIGNIFICANT CHANGE UP (ref 5–17)
AST SERPL-CCNC: 24 U/L — SIGNIFICANT CHANGE UP (ref 10–40)
BASOPHILS # BLD AUTO: 0.1 K/UL — SIGNIFICANT CHANGE UP (ref 0–0.2)
BASOPHILS NFR BLD AUTO: 1.4 % — SIGNIFICANT CHANGE UP (ref 0–2)
BILIRUB SERPL-MCNC: 0.3 MG/DL — SIGNIFICANT CHANGE UP (ref 0.2–1.2)
BUN SERPL-MCNC: 25 MG/DL — HIGH (ref 7–23)
CALCIUM SERPL-MCNC: 9.8 MG/DL — SIGNIFICANT CHANGE UP (ref 8.4–10.5)
CHLORIDE SERPL-SCNC: 105 MMOL/L — SIGNIFICANT CHANGE UP (ref 96–108)
CO2 SERPL-SCNC: 24 MMOL/L — SIGNIFICANT CHANGE UP (ref 22–31)
CREAT SERPL-MCNC: 1.23 MG/DL — SIGNIFICANT CHANGE UP (ref 0.5–1.3)
EGFR: 66 ML/MIN/1.73M2 — SIGNIFICANT CHANGE UP
EOSINOPHIL # BLD AUTO: 0.29 K/UL — SIGNIFICANT CHANGE UP (ref 0–0.5)
EOSINOPHIL NFR BLD AUTO: 4 % — SIGNIFICANT CHANGE UP (ref 0–6)
GLUCOSE SERPL-MCNC: 139 MG/DL — HIGH (ref 70–99)
HCT VFR BLD CALC: 42.2 % — SIGNIFICANT CHANGE UP (ref 39–50)
HGB BLD-MCNC: 13.6 G/DL — SIGNIFICANT CHANGE UP (ref 13–17)
IMM GRANULOCYTES NFR BLD AUTO: 0.3 % — SIGNIFICANT CHANGE UP (ref 0–0.9)
LYMPHOCYTES # BLD AUTO: 1.79 K/UL — SIGNIFICANT CHANGE UP (ref 1–3.3)
LYMPHOCYTES # BLD AUTO: 24.6 % — SIGNIFICANT CHANGE UP (ref 13–44)
MCHC RBC-ENTMCNC: 27.8 PG — SIGNIFICANT CHANGE UP (ref 27–34)
MCHC RBC-ENTMCNC: 32.1 G/DL — SIGNIFICANT CHANGE UP (ref 32–36)
MCV RBC AUTO: 86.3 FL — SIGNIFICANT CHANGE UP (ref 80–100)
MONOCYTES # BLD AUTO: 0.51 K/UL — SIGNIFICANT CHANGE UP (ref 0–0.9)
MONOCYTES NFR BLD AUTO: 7 % — SIGNIFICANT CHANGE UP (ref 2–14)
NEUTROPHILS # BLD AUTO: 4.56 K/UL — SIGNIFICANT CHANGE UP (ref 1.8–7.4)
NEUTROPHILS NFR BLD AUTO: 62.7 % — SIGNIFICANT CHANGE UP (ref 43–77)
NRBC # BLD: 0 /100 WBCS — SIGNIFICANT CHANGE UP (ref 0–0)
PLATELET # BLD AUTO: 264 K/UL — SIGNIFICANT CHANGE UP (ref 150–400)
POTASSIUM SERPL-MCNC: 4.3 MMOL/L — SIGNIFICANT CHANGE UP (ref 3.5–5.3)
POTASSIUM SERPL-SCNC: 4.3 MMOL/L — SIGNIFICANT CHANGE UP (ref 3.5–5.3)
PROT SERPL-MCNC: 7.4 G/DL — SIGNIFICANT CHANGE UP (ref 6–8.3)
RBC # BLD: 4.82 M/UL — SIGNIFICANT CHANGE UP (ref 4.2–5.8)
RBC # FLD: 22.4 % — HIGH (ref 10.3–14.5)
SODIUM SERPL-SCNC: 140 MMOL/L — SIGNIFICANT CHANGE UP (ref 135–145)
WBC # BLD: 7.27 K/UL — SIGNIFICANT CHANGE UP (ref 3.8–10.5)
WBC # FLD AUTO: 7.27 K/UL — SIGNIFICANT CHANGE UP (ref 3.8–10.5)

## 2024-01-19 LAB
FERRITIN SERPL-MCNC: 29 NG/ML — LOW (ref 30–400)
IRON SATN MFR SERPL: 464 UG/DL — HIGH (ref 45–165)
IRON SATN MFR SERPL: 95 % — HIGH (ref 16–55)
TIBC SERPL-MCNC: 491 UG/DL — HIGH (ref 220–430)
UIBC SERPL-MCNC: 27 UG/DL — LOW (ref 110–370)

## 2024-01-22 RX ORDER — FERROUS SULFATE 324(65)MG
324 TABLET, DELAYED RELEASE (ENTERIC COATED) ORAL
Qty: 90 | Refills: 1 | Status: ACTIVE | COMMUNITY
Start: 2023-11-08 | End: 1900-01-01

## 2024-01-23 ENCOUNTER — APPOINTMENT (OUTPATIENT)
Dept: HEMATOLOGY ONCOLOGY | Facility: CLINIC | Age: 64
End: 2024-01-23

## 2024-01-23 LAB
APTT BLD: 36.1 SEC
INR PPP: 0.86 RATIO
PT BLD: 9.8 SEC

## 2024-01-26 NOTE — H&P PST ADULT - NEGATIVE ENDOCRINE SYMPTOMS
Answers submitted by the patient for this visit:  Other (Submitted on 1/25/2024)  Please describe your symptoms.: Annual pap  Have you had these symptoms before?: No  How long have you been having these symptoms?: Greater than 2 weeks  Please describe any probable cause for these symptoms. : None. Annual pap smear  Primary Reason for Visit (Submitted on 1/25/2024)  What is the primary reason for your visit?: Other    Chief Complaint  Chief Complaint   Patient presents with    Gynecologic Exam    Annual Exam       Subjective    History of Present Illness  Sita Beaver is a 34 y.o. female presents to [facility] for well woman exam.  There are acute concerns today. She had a carpal tunnel injection 3 weeks ago, also diagnosed with concurrent DeQuervains. Her carpal tunnel symptoms improved after the steroid injection, but the dorsal thumb pain has worsened. She is wearing a thumb spice brace at work and at night. She is doing home exercises and has been referred to PT. She takes advil or aleve sometimes, tries to avoid NSAIDs due to chronic migraines. Topical voltaren gel helps a little.     The patient is sexually active with male partners. There is no pain or bleeding with intercourse.  The current method of family planning is tubal ligation and IUD.  Patient has been pregnant 3 times. Patient has given birth 2 times.  There is no incontinence with laughing/sneezing/running/jumping.  There is no pelvic pain.   There is no vaginal discharge.   There is no dysuria.  There is no abnormal breast lumps or discharge. Patient is doing self-exams. She has had some breast pain, has been referred for mammo and ultrasound due to family history of breast cancer.         No LMP recorded. Patient has had an implant.    Current Outpatient Medications on File Prior to Visit   Medication Sig Dispense Refill    buPROPion XL (Wellbutrin XL) 150 MG 24 hr tablet Take 1 tablet by mouth Every Morning.  Start 7 days before desired quit date. 90 tablet 0    Dihydroergotamine Mesylate HFA (Trudhesa) 0.725 MG/ACT aerosol solution 1 spray into the nostril(s) as directed by provider 1 (One) Time As Needed (Migraine) for up to 1 dose. Give 1 spray into EACH nostril for 1 dose (2 sprays). May repeat 1 dose in 1 hour if needed. Max of 2 doses (4 sprays) in 24 hours; max of 3 doses (6 sprays) in 7 days. Do NOT take triptans or Trudhesa within 24 hours of each other. 12 mL 3    Eptinezumab-jjmr (Vyepti) 100 MG/ML solution solution       meclizine (ANTIVERT) 25 MG tablet Take 1 tablet by mouth 3 (Three) Times a Day As Needed for Dizziness. 90 tablet 0    sertraline (ZOLOFT) 100 MG tablet Take 2.5 tablets by mouth Daily. Indications: Generalized Anxiety Disorder 225 tablet 3    ZOLMitriptan 2.5 MG solution        No current facility-administered medications on file prior to visit.       Patient Active Problem List   Diagnosis    Migraine    Anxiety    Atopic rhinitis    Other headache syndrome    Vertigo    History of gestational diabetes    History of anemia    Breast tenderness in female    History of pre-eclampsia    Tobacco dependence due to cigarettes    Hyperreflexia    Carpal tunnel syndrome of left wrist    Radial styloid tenosynovitis (de quervain)       Past Medical History:   Diagnosis Date    39 weeks gestation of pregnancy 08/11/2018    Abdominal wall abscess 08/23/2018    Anemia affecting pregnancy 09/16/2022    Anemia complicating pregnancy in third trimester 09/21/2022    Anxiety 1997    Candidiasis of mouth 12/07/2023    Chronic daily headache 11/17/2020    Elevated liver enzymes 08/11/2018    Intractable chronic migraine without aura and without status migrainosus 08/01/2014    Formatting of this note might be different from the original. 2015 IMO UPDATE    Malabsorption 09/21/2022    Medication overuse headache 11/17/2020    Migraine     Pleurodynia 12/07/2023    Pregnancy 08/09/2018    Sore throat  2023    Vaginitis 2023       Past Surgical History:   Procedure Laterality Date     SECTION      TUBAL ABDOMINAL LIGATION         Family History   Adopted: Yes   Problem Relation Age of Onset    Cervical cancer Mother         Birth mother - cervical    Vision loss Mother     Migraines Mother     Alcohol abuse Father         Birth father    Drug abuse Father     Early death Father     Cirrhosis Father     Migraines Father     Cancer Paternal Aunt         not sure what type    Migraines Half-Sister        Health Maintenance Summary            Ordered - PAP SMEAR (Every 3 Years) Ordered on 2023  Postponed until 2024 by Park Duran MD (Pending event)    2015  Pap IG, rfx HPV all pth              Postponed - COVID-19 Vaccine (4 - 2023-24 season) Postponed until 2023  Postponed until 2024 by Park Duran MD (Product Unavailable)    2021  Imm Admin: COVID-19 (MODERNA) Monovalent Original Booster    2021  Imm Admin: COVID-19 (PFIZER) Purple Cap Monovalent    2021  Imm Admin: COVID-19 (PFIZER) Purple Cap Monovalent              Postponed - TDAP/TD VACCINES (1 - Tdap) Postponed until 2023  Postponed until 2032 by Park Duran MD (Not Indicated)    2022  Imm Admin: Td, Unspecified              ANNUAL PHYSICAL (Yearly) Next due on 2023  Done              BMI FOLLOWUP (Yearly) Next due on 2024  Registry Metric: BMI Follow-up    2023  Postponed until 2023 by Park Duran MD (Not Indicated)    2023  Smart[employer]: WORKFLOW - QUALITY MEASUREMENT - DOCUMENTED WEIGHT FOLLOW-UP PLAN              HEPATITIS C SCREENING  Completed      2023  Hep C Virus Ab component of Hepatitis C Antibody              Pneumococcal Vaccine 0-64 (Series Information) Completed      2023  Postponed until 2023 by Park Duran MD (Pending event)     "12/07/2023  Imm Admin: Pneumococcal Conjugate 20-Valent (PCV20)              INFLUENZA VACCINE  Completed      12/07/2023  Imm Admin: Fluzone (or Fluarix & Flulaval for VFC) >6mos    09/30/2021  Imm Admin: Influenza MDCK Quadrivalent with Preserative    09/28/2020  Imm Admin: Influenza, Unspecified    10/09/2019  Imm Admin: Influenza, Unspecified                     Objective   Vitals:    01/26/24 0910   BP: 110/72   Pulse: 84   SpO2: 96%   Weight: 68 kg (150 lb)   Height: 157.5 cm (62\")      Physical exam  HEENT normal. PERRLA. MMM. Neck supple. Normal respiratory effort. Well perfused. Abdomen soft. Extremities show no edema, normal range of motion. Normal gait, no focal neurological findings.    BREAST EXAM: risk and benefit of breast self-exam was discussed, not examined    PELVIC EXAM: normal external genitalia, vulva; vaginal introitus is narrow and required a small speculum, very retroverted cervix, pap smear collected using thin prep    The following data was reviewed by: Park Duran MD on 01/26/2024:  CMP          12/7/2023    00:00   CMP   Glucose 93    BUN 12    Creatinine 0.65    Sodium 140    Potassium 4.2    Chloride 102    Calcium 9.7    Total Protein 8.0    Albumin 5.2    Globulin 2.8    Total Bilirubin 0.5    Alkaline Phosphatase 62    AST (SGOT) 18    ALT (SGPT) 25    BUN/Creatinine Ratio 18      CBC          12/7/2023    00:00   CBC   WBC 11.4    RBC 4.80    Hemoglobin 14.9    Hematocrit 42.6    MCV 89    MCH 31.0    MCHC 35.0    RDW 12.3    Platelets 324      Lipid Panel          12/7/2023    00:00   Lipid Panel   Total Cholesterol 182    Triglycerides 69    HDL Cholesterol 35    VLDL Cholesterol 13    LDL Cholesterol  134      TSH          12/7/2023    00:00   TSH   TSH 0.525      Most Recent A1C          12/7/2023    00:00   HGBA1C Most Recent   Hemoglobin A1C 5.2      Last PCP note      Assessment and Plan  Sitaanish Beaver is a 34 y.o. female presents to [facility] " [facility]IMARY [facility] today for well woman, chart reviewed and updated, medications reviewed, labs reviewed, preventative med reviewed. Pt is currently doing well, no concerning findings on history or exam. Answered all questions at this time, pt expressed no further concerns today.      Preventative medicine:   HIV Screen - N/A  STI screening: N/A  Immunizations UTD: Yes  Anxiety/depression screening: not indicated  Tobacco cessation counseling: N/A  Pap:  completed today   Next pap due in 5 years in accordance with ASCCP guidelines if normal today.   Mammogram:  ordered    Osteoporosis Screen:  age 65      Diagnoses and all orders for this visit:    1. Encounter for routine gynecological examination with Papanicolaou smear of cervix (Primary)  -     IGP, Aptima HPV, Rfx 16 / 18,45    2. Radial styloid tenosynovitis (de quervain)  Comments:  No improvement with conservative tx, symptoms impacting patient sleep and functioning. Will ref to hand surgery for eval.  Orders:  -     Ambulatory Referral to Hand Surgery    3. Carpal tunnel syndrome of left wrist  -     Ambulatory Referral to Hand Surgery        Patient voiced understanding and agreement with plan of care and had no further questions or concerns at this time.     Park Duran MD  Family Medicine  [facility]      Follow Up  Return if symptoms worsen or fail to improve.    Patient Instructions   Today: Pap completed today.    Meds: No changes    Referrals: Hand surgery.     Imaging: None                  no cold intolerance/no heat intolerance

## 2024-02-02 ENCOUNTER — OUTPATIENT (OUTPATIENT)
Dept: OUTPATIENT SERVICES | Facility: HOSPITAL | Age: 64
LOS: 1 days | End: 2024-02-02
Payer: MEDICAID

## 2024-02-02 ENCOUNTER — RESULT REVIEW (OUTPATIENT)
Age: 64
End: 2024-02-02

## 2024-02-02 ENCOUNTER — TRANSCRIPTION ENCOUNTER (OUTPATIENT)
Age: 64
End: 2024-02-02

## 2024-02-02 VITALS
SYSTOLIC BLOOD PRESSURE: 138 MMHG | OXYGEN SATURATION: 99 % | HEART RATE: 66 BPM | RESPIRATION RATE: 13 BRPM | DIASTOLIC BLOOD PRESSURE: 91 MMHG

## 2024-02-02 VITALS
WEIGHT: 179.9 LBS | RESPIRATION RATE: 19 BRPM | SYSTOLIC BLOOD PRESSURE: 148 MMHG | HEART RATE: 72 BPM | OXYGEN SATURATION: 99 % | TEMPERATURE: 97 F | DIASTOLIC BLOOD PRESSURE: 94 MMHG | HEIGHT: 70 IN

## 2024-02-02 DIAGNOSIS — C16.9 MALIGNANT NEOPLASM OF STOMACH, UNSPECIFIED: ICD-10-CM

## 2024-02-02 DIAGNOSIS — Z95.828 PRESENCE OF OTHER VASCULAR IMPLANTS AND GRAFTS: Chronic | ICD-10-CM

## 2024-02-02 DIAGNOSIS — Z98.890 OTHER SPECIFIED POSTPROCEDURAL STATES: Chronic | ICD-10-CM

## 2024-02-02 PROCEDURE — 36590 REMOVAL TUNNELED CV CATH: CPT

## 2024-02-02 RX ORDER — VALSARTAN 80 MG/1
1 TABLET ORAL
Qty: 0 | Refills: 0 | DISCHARGE

## 2024-02-02 RX ORDER — AMLODIPINE BESYLATE 2.5 MG/1
1 TABLET ORAL
Qty: 0 | Refills: 0 | DISCHARGE

## 2024-02-02 NOTE — PRE PROCEDURE NOTE - PRE PROCEDURE EVALUATION
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   -- Relevant imaging and labs were reviewed.   -- Risks, benefits, and alternatives were explained to the patient and informed consent was obtained.

## 2024-02-02 NOTE — ASU DISCHARGE PLAN (ADULT/PEDIATRIC) - NURSING INSTRUCTIONS
Please feel free to contact us at [phone] if any problems arise. After 6PM, Monday through Friday, on weekends and on holidays, please call [phone] and ask for the radiology resident on call to be paged.

## 2024-02-02 NOTE — ASU DISCHARGE PLAN (ADULT/PEDIATRIC) - NS MD DC FALL RISK RISK
For information on Fall & Injury Prevention, visit: [email or web address] OR  [email or web address] OR  https://www.cdc.gov/steadi/patient.html

## 2024-02-06 DIAGNOSIS — Z45.2 ENCOUNTER FOR ADJUSTMENT AND MANAGEMENT OF VASCULAR ACCESS DEVICE: ICD-10-CM

## 2024-02-23 NOTE — ASU PATIENT PROFILE, ADULT - INTERNATIONAL TRAVEL
Treatment Goal Explanation (Does Not Render In The Note): Stable for the purposes of categorizing medical decision making is defined by the specific treatment goals for an individual patient. A patient that is not at their treatment goal is not stable, even if the condition has not changed and there is no short- term threat to life or function.
Treatment Goal Met?: no
No

## 2024-04-23 ENCOUNTER — OUTPATIENT (OUTPATIENT)
Dept: OUTPATIENT SERVICES | Facility: HOSPITAL | Age: 64
LOS: 1 days | Discharge: HOME | End: 2024-04-23

## 2024-04-23 DIAGNOSIS — Z98.890 OTHER SPECIFIED POSTPROCEDURAL STATES: Chronic | ICD-10-CM

## 2024-04-23 DIAGNOSIS — C16.9 MALIGNANT NEOPLASM OF STOMACH, UNSPECIFIED: ICD-10-CM

## 2024-04-23 DIAGNOSIS — Z95.828 PRESENCE OF OTHER VASCULAR IMPLANTS AND GRAFTS: Chronic | ICD-10-CM

## 2024-04-30 ENCOUNTER — NON-APPOINTMENT (OUTPATIENT)
Age: 64
End: 2024-04-30

## 2024-05-01 ENCOUNTER — APPOINTMENT (OUTPATIENT)
Dept: HEMATOLOGY ONCOLOGY | Facility: CLINIC | Age: 64
End: 2024-05-01
Payer: MEDICAID

## 2024-05-01 VITALS
HEIGHT: 69.04 IN | SYSTOLIC BLOOD PRESSURE: 132 MMHG | HEART RATE: 81 BPM | RESPIRATION RATE: 17 BRPM | WEIGHT: 184.3 LBS | BODY MASS INDEX: 27.3 KG/M2 | TEMPERATURE: 97.2 F | DIASTOLIC BLOOD PRESSURE: 80 MMHG | OXYGEN SATURATION: 98 %

## 2024-05-01 DIAGNOSIS — D50.9 IRON DEFICIENCY ANEMIA, UNSPECIFIED: ICD-10-CM

## 2024-05-01 DIAGNOSIS — C16.9 MALIGNANT NEOPLASM OF STOMACH, UNSPECIFIED: ICD-10-CM

## 2024-05-01 PROCEDURE — G2211 COMPLEX E/M VISIT ADD ON: CPT | Mod: NC,1L

## 2024-05-01 PROCEDURE — 99214 OFFICE O/P EST MOD 30 MIN: CPT

## 2024-05-01 NOTE — HISTORY OF PRESENT ILLNESS
[Disease: _____________________] : Disease: [unfilled] [T: ___] : T[unfilled] [N: ___] : N[unfilled] [M: ___] : M[unfilled] [AJCC Stage: ____] : AJCC Stage: [unfilled] [de-identified] : Patient is a 63 y/o M with known PMHx of HTN and pre-diabetes who first presented to a GI, Dr. Paul, 9/29/21 c/o abdominal bloating and heart burn symptoms especially at night progressive for ~ 2 year.  He was initially managed conservatively with an ordered H. pylori test (patient never went for testing), trial of PPI and life style modifications. Patient states that he saw his PCP in October 2021 for routine follow up and was noted to have a drop in his Hgb from 13.9 to 12.3.  He was started on OTC iron supplements and repeat labs 1/2022 were improved.  Given the HANANE and the GI symptoms, the patient returned to a GI for re-evaluation. The patient presented to Dr. Juan Antonio Steinberg on 2/20/22 and an endoscopy was performed on 2/21/22.  The EGD showed evidence of reflux esophagitis at the EG-junction, nonactive mild gastritis in the antrum and a large ulcer was seen at incisura.  Biopsies taken at the stomach antrum showed active chronic H. pylori gastritis with intestinal metaplasia.  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  A CT A/P performed on 2/25/22 showed a sequela of small airways inflammatory disease/bronchiolitis within the left lower lobe and to a lesser extent right middle and right lower lobes of the lungs. The stomach was only partially distended but there was no definite gastric mass identified.  There was otherwise no evidence of metastatic disease in the abdomen.  The patient started treatment for H. pylori earlier this week and a colonoscopy was performed 2 days ago with a polyp removed and results pending.  The patient presents today to establish oncologic care.\par  \par  \par  EUS T3NO\par  Dx lap negative \par  \par  FLOT X 5 --> Surgery 7/25 ypT2N0 [de-identified] : moderately differentiated intramucosal carcinoma seen with anastomosing glands lined by cells with hyperchromatic and pleomorphic nuclei.  No submucosa is present.  A background of gastritis and acute inflammatory exudate is noted [de-identified] : CEA [FreeTextEntry1] : FLOT x 5 --> surgery --> FLOT x 3  [de-identified] : presents in follow up , no acute complaints no fever or chills  no abdominal pain  GI w/u neg for blood loss source

## 2024-05-01 NOTE — REVIEW OF SYSTEMS
[Cough] : cough [Diarrhea: Grade 0] : Diarrhea: Grade 0 [Negative] : Allergic/Immunologic [Shortness Of Breath] : no shortness of breath [Wheezing] : no wheezing [SOB on Exertion] : no shortness of breath during exertion

## 2024-05-01 NOTE — PHYSICAL EXAM
[Fully active, able to carry on all pre-disease performance without restriction] : Status 0 - Fully active, able to carry on all pre-disease performance without restriction [Normal] : affect appropriate [de-identified] : anicteric  [de-identified] : normal respiratory effort, no audible wheeze [de-identified] : reg rate  [de-identified] : no LE swelling B/L [de-identified] : soft, non-tender, non-distended

## 2024-05-07 ENCOUNTER — RESULT REVIEW (OUTPATIENT)
Age: 64
End: 2024-05-07

## 2024-05-10 ENCOUNTER — APPOINTMENT (OUTPATIENT)
Dept: CT IMAGING | Facility: IMAGING CENTER | Age: 64
End: 2024-05-10
Payer: MEDICAID

## 2024-05-10 ENCOUNTER — OUTPATIENT (OUTPATIENT)
Dept: OUTPATIENT SERVICES | Facility: HOSPITAL | Age: 64
LOS: 1 days | End: 2024-05-10
Payer: MEDICAID

## 2024-05-10 DIAGNOSIS — Z98.890 OTHER SPECIFIED POSTPROCEDURAL STATES: Chronic | ICD-10-CM

## 2024-05-10 DIAGNOSIS — C16.9 MALIGNANT NEOPLASM OF STOMACH, UNSPECIFIED: ICD-10-CM

## 2024-05-10 DIAGNOSIS — Z95.828 PRESENCE OF OTHER VASCULAR IMPLANTS AND GRAFTS: Chronic | ICD-10-CM

## 2024-05-10 PROCEDURE — 71260 CT THORAX DX C+: CPT | Mod: 26

## 2024-05-10 PROCEDURE — 71260 CT THORAX DX C+: CPT

## 2024-05-10 PROCEDURE — 74177 CT ABD & PELVIS W/CONTRAST: CPT

## 2024-05-10 PROCEDURE — 74177 CT ABD & PELVIS W/CONTRAST: CPT | Mod: 26

## 2024-05-13 ENCOUNTER — NON-APPOINTMENT (OUTPATIENT)
Age: 64
End: 2024-05-13

## 2024-10-08 ENCOUNTER — NON-APPOINTMENT (OUTPATIENT)
Age: 64
End: 2024-10-08

## 2024-10-30 ENCOUNTER — OUTPATIENT (OUTPATIENT)
Dept: OUTPATIENT SERVICES | Facility: HOSPITAL | Age: 64
LOS: 1 days | Discharge: HOME | End: 2024-10-30

## 2024-10-30 DIAGNOSIS — Z98.890 OTHER SPECIFIED POSTPROCEDURAL STATES: Chronic | ICD-10-CM

## 2024-10-30 DIAGNOSIS — C16.9 MALIGNANT NEOPLASM OF STOMACH, UNSPECIFIED: ICD-10-CM

## 2024-10-30 DIAGNOSIS — Z95.828 PRESENCE OF OTHER VASCULAR IMPLANTS AND GRAFTS: Chronic | ICD-10-CM

## 2024-11-04 ENCOUNTER — APPOINTMENT (OUTPATIENT)
Dept: HEMATOLOGY ONCOLOGY | Facility: CLINIC | Age: 64
End: 2024-11-04
Payer: MEDICAID

## 2024-11-04 VITALS
TEMPERATURE: 97.7 F | SYSTOLIC BLOOD PRESSURE: 152 MMHG | BODY MASS INDEX: 27.11 KG/M2 | HEIGHT: 69.04 IN | RESPIRATION RATE: 16 BRPM | HEART RATE: 71 BPM | OXYGEN SATURATION: 97 % | DIASTOLIC BLOOD PRESSURE: 87 MMHG | WEIGHT: 183 LBS

## 2024-11-04 DIAGNOSIS — C16.9 MALIGNANT NEOPLASM OF STOMACH, UNSPECIFIED: ICD-10-CM

## 2024-11-04 PROCEDURE — 99214 OFFICE O/P EST MOD 30 MIN: CPT

## 2024-11-04 RX ORDER — ALLOPURINOL 100 MG/1
100 TABLET ORAL DAILY
Refills: 0 | Status: ACTIVE | COMMUNITY
Start: 2024-11-04

## 2024-11-05 LAB — CEA SERPL-MCNC: 2.9 NG/ML

## 2024-12-12 NOTE — ASU PATIENT PROFILE, ADULT - ABILITY TO HEAR (WITH HEARING AID OR HEARING APPLIANCE IF NORMALLY USED):
General Sunscreen Counseling: I recommended applying a broad spectrum sunscreen with an SPF of 30 or higher daily. SPF 30 sunscreens block approximately 97% of the sun's harmful rays. Sunscreens should be applied at least 15 minutes prior to expected sun exposure and then every 2 hours after that as long as sun exposure continues. If swimming or exercising sunscreen should be reapplied every 45 minutes to an hour after getting wet or sweating. I also recommended a lip balm with a sunscreen. Sun protective clothing can be used in lieu of sunscreen but must be worn the entire time you are exposed to the sun's rays.
Detail Level: Generalized
Adequate: hears normal conversation without difficulty

## 2025-02-26 ENCOUNTER — NON-APPOINTMENT (OUTPATIENT)
Age: 65
End: 2025-02-26

## 2025-03-10 ENCOUNTER — APPOINTMENT (OUTPATIENT)
Dept: UROLOGY | Facility: CLINIC | Age: 65
End: 2025-03-10
Payer: MEDICARE

## 2025-03-10 VITALS
HEART RATE: 75 BPM | SYSTOLIC BLOOD PRESSURE: 148 MMHG | BODY MASS INDEX: 26.66 KG/M2 | DIASTOLIC BLOOD PRESSURE: 88 MMHG | HEIGHT: 69 IN | WEIGHT: 180 LBS

## 2025-03-10 PROCEDURE — 76775 US EXAM ABDO BACK WALL LIM: CPT | Mod: 26

## 2025-03-10 PROCEDURE — 52000 CYSTOURETHROSCOPY: CPT

## 2025-03-10 PROCEDURE — 99204 OFFICE O/P NEW MOD 45 MIN: CPT | Mod: 25

## 2025-03-16 PROBLEM — R80.9 PROTEINURIA, UNSPECIFIED TYPE: Status: ACTIVE | Noted: 2025-03-16

## 2025-03-16 PROBLEM — R31.21 ASYMPTOMATIC MICROSCOPIC HEMATURIA: Status: ACTIVE | Noted: 2025-03-10

## 2025-03-16 LAB
APPEARANCE: CLEAR
BACTERIA: NEGATIVE /HPF
BILIRUBIN URINE: NEGATIVE
BLOOD URINE: ABNORMAL
CAST: 1 /LPF
COLOR: YELLOW
CREAT 24H UR-MCNC: 1.5 G/24 H
CREAT ?TM UR-MCNC: 79 MG/DL
EPITHELIAL CELLS: 0 /HPF
GLUCOSE QUALITATIVE U: NEGATIVE MG/DL
KETONES URINE: NEGATIVE MG/DL
LEUKOCYTE ESTERASE URINE: NEGATIVE
MICROSCOPIC-UA: NORMAL
NITRITE URINE: NEGATIVE
PH URINE: 7
PROT 24H UR-MRATE: 253 MG/DL
PROT ?TM UR-MCNC: 24 HR
PROT UR-MCNC: 4807 MG/24 H
PROTEIN URINE: 300 MG/DL
RED BLOOD CELLS URINE: 7 /HPF
SPECIFIC GRAVITY URINE: 1.01
SPECIMEN VOL 24H UR: 1900 ML
URINE CYTOLOGY: NORMAL
UROBILINOGEN URINE: 0.2 MG/DL
WHITE BLOOD CELLS URINE: 1 /HPF

## 2025-03-17 ENCOUNTER — NON-APPOINTMENT (OUTPATIENT)
Age: 65
End: 2025-03-17

## 2025-04-21 ENCOUNTER — APPOINTMENT (OUTPATIENT)
Dept: NEPHROLOGY | Facility: CLINIC | Age: 65
End: 2025-04-21

## 2025-05-06 ENCOUNTER — APPOINTMENT (OUTPATIENT)
Dept: CT IMAGING | Facility: IMAGING CENTER | Age: 65
End: 2025-05-06
Payer: MEDICARE

## 2025-05-06 ENCOUNTER — OUTPATIENT (OUTPATIENT)
Dept: OUTPATIENT SERVICES | Facility: HOSPITAL | Age: 65
LOS: 1 days | End: 2025-05-06
Payer: MEDICARE

## 2025-05-06 DIAGNOSIS — Z98.890 OTHER SPECIFIED POSTPROCEDURAL STATES: Chronic | ICD-10-CM

## 2025-05-06 DIAGNOSIS — C16.9 MALIGNANT NEOPLASM OF STOMACH, UNSPECIFIED: ICD-10-CM

## 2025-05-06 DIAGNOSIS — Z95.828 PRESENCE OF OTHER VASCULAR IMPLANTS AND GRAFTS: Chronic | ICD-10-CM

## 2025-05-06 PROCEDURE — 74177 CT ABD & PELVIS W/CONTRAST: CPT

## 2025-05-06 PROCEDURE — 71260 CT THORAX DX C+: CPT

## 2025-05-06 PROCEDURE — 71260 CT THORAX DX C+: CPT | Mod: 26

## 2025-05-06 PROCEDURE — 74177 CT ABD & PELVIS W/CONTRAST: CPT | Mod: 26

## 2025-05-08 ENCOUNTER — NON-APPOINTMENT (OUTPATIENT)
Age: 65
End: 2025-05-08

## 2025-05-08 ENCOUNTER — APPOINTMENT (OUTPATIENT)
Dept: DERMATOLOGY | Facility: CLINIC | Age: 65
End: 2025-05-08
Payer: MEDICARE

## 2025-05-08 VITALS — WEIGHT: 178 LBS | BODY MASS INDEX: 25.48 KG/M2 | HEIGHT: 70 IN

## 2025-05-08 DIAGNOSIS — L30.9 DERMATITIS, UNSPECIFIED: ICD-10-CM

## 2025-05-08 PROCEDURE — 99203 OFFICE O/P NEW LOW 30 MIN: CPT | Mod: 25

## 2025-05-08 PROCEDURE — 11102 TANGNTL BX SKIN SINGLE LES: CPT

## 2025-05-08 RX ORDER — CLOBETASOL PROPIONATE 0.5 MG/G
0.05 OINTMENT TOPICAL
Qty: 1 | Refills: 2 | Status: ACTIVE | COMMUNITY
Start: 2025-05-08 | End: 1900-01-01

## 2025-05-16 LAB — DERMATOLOGY BIOPSY: NORMAL

## 2025-05-20 ENCOUNTER — APPOINTMENT (OUTPATIENT)
Dept: NEPHROLOGY | Facility: CLINIC | Age: 65
End: 2025-05-20

## 2025-05-24 ENCOUNTER — OUTPATIENT (OUTPATIENT)
Dept: OUTPATIENT SERVICES | Facility: HOSPITAL | Age: 65
LOS: 1 days | Discharge: HOME | End: 2025-05-24

## 2025-05-24 DIAGNOSIS — Z98.890 OTHER SPECIFIED POSTPROCEDURAL STATES: Chronic | ICD-10-CM

## 2025-05-24 DIAGNOSIS — Z95.828 PRESENCE OF OTHER VASCULAR IMPLANTS AND GRAFTS: Chronic | ICD-10-CM

## 2025-05-24 DIAGNOSIS — C16.9 MALIGNANT NEOPLASM OF STOMACH, UNSPECIFIED: ICD-10-CM

## 2025-05-28 ENCOUNTER — RESULT REVIEW (OUTPATIENT)
Age: 65
End: 2025-05-28

## 2025-05-28 ENCOUNTER — APPOINTMENT (OUTPATIENT)
Dept: HEMATOLOGY ONCOLOGY | Facility: CLINIC | Age: 65
End: 2025-05-28
Payer: MEDICARE

## 2025-05-28 VITALS
WEIGHT: 176.15 LBS | DIASTOLIC BLOOD PRESSURE: 81 MMHG | TEMPERATURE: 98 F | BODY MASS INDEX: 25.28 KG/M2 | RESPIRATION RATE: 17 BRPM | SYSTOLIC BLOOD PRESSURE: 128 MMHG | OXYGEN SATURATION: 98 % | HEART RATE: 87 BPM

## 2025-05-28 DIAGNOSIS — C16.9 MALIGNANT NEOPLASM OF STOMACH, UNSPECIFIED: ICD-10-CM

## 2025-05-28 DIAGNOSIS — E78.5 HYPERLIPIDEMIA, UNSPECIFIED: ICD-10-CM

## 2025-05-28 LAB
BASOPHILS # BLD AUTO: 0.08 K/UL — SIGNIFICANT CHANGE UP (ref 0–0.2)
BASOPHILS NFR BLD AUTO: 0.6 % — SIGNIFICANT CHANGE UP (ref 0–2)
EOSINOPHIL # BLD AUTO: 0.34 K/UL — SIGNIFICANT CHANGE UP (ref 0–0.5)
EOSINOPHIL NFR BLD AUTO: 2.7 % — SIGNIFICANT CHANGE UP (ref 0–6)
HCT VFR BLD CALC: 40.4 % — SIGNIFICANT CHANGE UP (ref 39–50)
HGB BLD-MCNC: 13.5 G/DL — SIGNIFICANT CHANGE UP (ref 13–17)
IMM GRANULOCYTES NFR BLD AUTO: 0.4 % — SIGNIFICANT CHANGE UP (ref 0–0.9)
LYMPHOCYTES # BLD AUTO: 1.5 K/UL — SIGNIFICANT CHANGE UP (ref 1–3.3)
LYMPHOCYTES # BLD AUTO: 11.7 % — LOW (ref 13–44)
MCHC RBC-ENTMCNC: 32.1 PG — SIGNIFICANT CHANGE UP (ref 27–34)
MCHC RBC-ENTMCNC: 33.4 G/DL — SIGNIFICANT CHANGE UP (ref 32–36)
MCV RBC AUTO: 96 FL — SIGNIFICANT CHANGE UP (ref 80–100)
MONOCYTES # BLD AUTO: 0.68 K/UL — SIGNIFICANT CHANGE UP (ref 0–0.9)
MONOCYTES NFR BLD AUTO: 5.3 % — SIGNIFICANT CHANGE UP (ref 2–14)
NEUTROPHILS # BLD AUTO: 10.16 K/UL — HIGH (ref 1.8–7.4)
NEUTROPHILS NFR BLD AUTO: 79.3 % — HIGH (ref 43–77)
NRBC BLD AUTO-RTO: 0 /100 WBCS — SIGNIFICANT CHANGE UP (ref 0–0)
PLATELET # BLD AUTO: 270 K/UL — SIGNIFICANT CHANGE UP (ref 150–400)
RBC # BLD: 4.21 M/UL — SIGNIFICANT CHANGE UP (ref 4.2–5.8)
RBC # FLD: 13.2 % — SIGNIFICANT CHANGE UP (ref 10.3–14.5)
WBC # BLD: 12.81 K/UL — HIGH (ref 3.8–10.5)
WBC # FLD AUTO: 12.81 K/UL — HIGH (ref 3.8–10.5)

## 2025-05-28 PROCEDURE — 99203 OFFICE O/P NEW LOW 30 MIN: CPT

## 2025-05-28 PROCEDURE — G2211 COMPLEX E/M VISIT ADD ON: CPT

## 2025-05-28 RX ORDER — ATORVASTATIN CALCIUM 10 MG/1
10 TABLET, FILM COATED ORAL DAILY
Refills: 0 | Status: ACTIVE | COMMUNITY
Start: 2025-05-28

## 2025-05-29 LAB
25(OH)D3 SERPL-MCNC: 39.2 NG/ML
CEA SERPL-MCNC: 3.3 NG/ML
FERRITIN SERPL-MCNC: 67 NG/ML
IRON SATN MFR SERPL: 19 %
IRON SERPL-MCNC: 84 UG/DL
TIBC SERPL-MCNC: 455 UG/DL
UIBC SERPL-MCNC: 370 UG/DL
VIT B12 SERPL-MCNC: 854 PG/ML

## 2025-05-30 ENCOUNTER — NON-APPOINTMENT (OUTPATIENT)
Age: 65
End: 2025-05-30

## 2025-06-24 NOTE — PRE-ANESTHESIA EVALUATION ADULT - MALLAMPATI CLASS
Physical Therapy Visit    Visit Type: Daily Treatment Note  Visit: 5  Referring Provider: Marvin Brown MD  Medical Diagnosis (from order): M22.2X2 - Patellofemoral disorder of left knee     SUBJECTIVE                                                                                                               Patient states the brace will not stay in place and makes the knee hurt more.       OBJECTIVE                                                                                                                                         Treatment     Therapeutic Exercise  +Performed at Current Session     *Part of Home Exercise Program  -Performed at a Previous Session   Patient education on protocol  +worked on adjusting patient's brace; the lateral dial portion tends to shift anteriorly pushing into the lateral tibial plateau and gives the appearance of genu valgus;  This was improved but not perfect and advised patient if still bothering her, she should contact the orthopedic office and ask if they can try to adjust this for her.  +*Bike seat 7;  half revolutions  x 10 minutes; able to complete revolutions backwards   -*Prone hangs thigh on a towel-PT assist into position left LE  -*supine knee flexion with assist  x 10  -*Supine dorsiflexion calf stretch looped sheet at ankle x 1 minute.   -Supine quad sets x 20 3-5 sec hold  -*Assisted Supine straight leg raise 10 x (to use brace at home if doing on own  -*Seated knee flexion stretch other foot assisting  -Standing weight shifting in staggered stance pre gait activity then worked on gait with heel strike/extending knee at mid stance Brace on  +*Stand Bilateral heel raises x 30 on incline  +*Stand bilateral lower extremity abduction 2 x 10 , extension 2 x 10 ( brace on ) Added RED band   +*Band sidestepping RED 15/15      Manual Therapy   Gentle Soft tissue mobilization knee muscles  -Patellar mobilizations  WNL  Scar mobilization   -Kinesio Taping double fan  for swelling; instruction on wear, care, removal    Therapeutic Activity  +*Step down heel taps/dips 2\" medial 15 x; Added anterior 10 x   +*Step ups 6\" anterior  15 x and lateral  15 x Left only    Neuromuscular Re-Education  -Single leg stance with brace on eyes open (progress to closed) 20 sec 3-4 x  Airex    Skilled input: verbal instruction/cues and tactile instruction/cues    Writer verbally educated and received verbal consent for hand placement, positioning of patient, and techniques to be performed today from patient for therapist position for techniques and hand placement and palpation for techniques as described above and how they are pertinent to the patient's plan of care.  Home Exercise Program  Access Code: [email or web address]  URL: [email or web address]  Date: 06/24/2025  Prepared by: Ayala Hobbs    Exercises  - Prone Knee Extension Hang  - 4-5 x daily - 7 x weekly - 1 sets - 25 reps - 3-5 hold  - Long Sitting Calf Stretch with Strap  - 1 x daily - 7 x weekly - 3 sets - 10 reps  - Supine Heel Slide with Strap  - 4-5 x daily - 7 x weekly - 1 sets - 25 reps - 3-5 hold  - Single Leg Stance with Support  - 4 x daily - 7 x weekly - 1 sets - 5 reps  - Heel Raise  - 1 x daily - 7 x weekly - 3 sets - 10 reps  - Supine Quadricep Sets  - 1 x daily - 7 x weekly - 2 sets - 10 reps  - Small Range Straight Leg Raise  - 1 x daily - 7 x weekly - 2 sets - 10 reps  - Standing Hip Abduction with Counter Support  - 1 x daily - 7 x weekly - 2 sets - 10 reps  - Standing Hip Extension with Counter Support  - 1 x daily - 7 x weekly - 2 sets - 10 reps  - Seated Ankle Dorsiflexion Stretch  - 1 x daily - 7 x weekly - 1 sets - 10 reps - 5 hold  - Side Stepping with Resistance at Ankles  - 1 x daily - 7 x weekly - 1-3 sets - 10 reps  - Step Up  - 1 x daily - 7 x weekly - 1-3 sets - 15 reps  - Lateral Step Up  - 1 x daily - 7 x weekly - 1-3 sets - 15 reps  - Standing Lateral Step-Down Heel Tap  - 1 x daily - 7 x  weekly - 1-3 sets - 15 reps  - Forward Step Down Touch with Heel  - 1 x daily - 7 x weekly - 1-3 sets - 10 reps      ASSESSMENT                                                                                                            Patient has been having problems with her brace not fitting properly.  It tends to rotate inward on her and the lateral dial area pushes into her knee.  Attempted to make some adjustments to it today and it was improved and felt better but not perfect and the lateral dial is still slightly anterior compared to the medial dial area.  Patient will contact ortho if it needs additional adjusting.  Patient doing well with her rehab and is on target with her protocol.  Continue Physical Therapy twice weekly.  FOLLOW UP with ortho is on 7/3/25.  Education:   - Results of above outlined education: Verbalizes understanding and Demonstrates understanding           Therapy procedure time and total treatment time can be found documented on the Time Entry flowsheet     Class II - visualization of the soft palate, fauces, and uvula

## 2025-06-25 ENCOUNTER — NON-APPOINTMENT (OUTPATIENT)
Age: 65
End: 2025-06-25

## 2025-06-25 ENCOUNTER — APPOINTMENT (OUTPATIENT)
Dept: PULMONOLOGY | Facility: CLINIC | Age: 65
End: 2025-06-25
Payer: MEDICARE

## 2025-06-25 VITALS
DIASTOLIC BLOOD PRESSURE: 76 MMHG | RESPIRATION RATE: 16 BRPM | WEIGHT: 175 LBS | HEART RATE: 79 BPM | HEIGHT: 70 IN | SYSTOLIC BLOOD PRESSURE: 131 MMHG | OXYGEN SATURATION: 97 % | BODY MASS INDEX: 25.05 KG/M2

## 2025-06-25 PROCEDURE — 94060 EVALUATION OF WHEEZING: CPT

## 2025-06-25 PROCEDURE — 99204 OFFICE O/P NEW MOD 45 MIN: CPT | Mod: 25

## 2025-06-25 PROCEDURE — 94729 DIFFUSING CAPACITY: CPT

## 2025-06-25 PROCEDURE — ZZZZZ: CPT

## 2025-06-25 PROCEDURE — 95012 NITRIC OXIDE EXP GAS DETER: CPT

## 2025-06-25 PROCEDURE — 94727 GAS DIL/WSHOT DETER LNG VOL: CPT

## 2025-06-25 RX ORDER — BUDESONIDE AND FORMOTEROL FUMARATE DIHYDRATE 160; 4.5 UG/1; UG/1
160-4.5 AEROSOL RESPIRATORY (INHALATION) TWICE DAILY
Qty: 3 | Refills: 3 | Status: ACTIVE | COMMUNITY
Start: 2025-06-25 | End: 1900-01-01

## 2025-07-05 ENCOUNTER — OUTPATIENT (OUTPATIENT)
Dept: OUTPATIENT SERVICES | Facility: HOSPITAL | Age: 65
LOS: 1 days | End: 2025-07-05
Payer: MEDICARE

## 2025-07-05 ENCOUNTER — APPOINTMENT (OUTPATIENT)
Dept: NUCLEAR MEDICINE | Facility: IMAGING CENTER | Age: 65
End: 2025-07-05
Payer: MEDICARE

## 2025-07-05 DIAGNOSIS — R91.1 SOLITARY PULMONARY NODULE: ICD-10-CM

## 2025-07-05 DIAGNOSIS — Z98.890 OTHER SPECIFIED POSTPROCEDURAL STATES: Chronic | ICD-10-CM

## 2025-07-05 DIAGNOSIS — Z95.828 PRESENCE OF OTHER VASCULAR IMPLANTS AND GRAFTS: Chronic | ICD-10-CM

## 2025-07-05 PROCEDURE — 78815 PET IMAGE W/CT SKULL-THIGH: CPT | Mod: 26,PI

## 2025-07-05 PROCEDURE — A9552: CPT

## 2025-07-05 PROCEDURE — 78815 PET IMAGE W/CT SKULL-THIGH: CPT

## 2025-07-15 ENCOUNTER — LABORATORY RESULT (OUTPATIENT)
Age: 65
End: 2025-07-15

## 2025-07-15 ENCOUNTER — APPOINTMENT (OUTPATIENT)
Dept: PULMONOLOGY | Facility: CLINIC | Age: 65
End: 2025-07-15
Payer: MEDICARE

## 2025-07-15 VITALS — DIASTOLIC BLOOD PRESSURE: 83 MMHG | SYSTOLIC BLOOD PRESSURE: 121 MMHG | OXYGEN SATURATION: 97 % | HEART RATE: 79 BPM

## 2025-07-15 PROBLEM — R49.0 DYSPHONIA: Status: ACTIVE | Noted: 2025-07-15

## 2025-07-15 PROBLEM — J45.909 ASTHMA: Status: ACTIVE | Noted: 2025-06-25

## 2025-07-15 PROCEDURE — 95012 NITRIC OXIDE EXP GAS DETER: CPT

## 2025-07-15 PROCEDURE — 99214 OFFICE O/P EST MOD 30 MIN: CPT | Mod: 25

## 2025-07-15 PROCEDURE — 36415 COLL VENOUS BLD VENIPUNCTURE: CPT

## 2025-07-15 PROCEDURE — 94060 EVALUATION OF WHEEZING: CPT

## 2025-07-20 LAB
ACE BLD-CCNC: 27 U/L
ALTERNARIA TENUIS/ALTERNATA IGG: 6.5 MCG/ML
APTT BLD: 36.1 SEC
ASPER FUMCAPG(M3): 12.3 MCG/ML
AUREOBASCAPG(M12): <2 MCG/ML
BASOPHILS # BLD AUTO: 0.13 K/UL
BASOPHILS NFR BLD AUTO: 1.3 %
CK SERPL-CCNC: 838 U/L
CRP SERPL-MCNC: 3 MG/L
ENA JO1 AB SER-ACNC: <0.2 AL
ENA SCL70 AB SER-ACNC: <0.2 AL
EOSINOPHIL # BLD AUTO: 0.29 K/UL
EOSINOPHIL NFR BLD AUTO: 3 %
ERYTHROCYTE [SEDIMENTATION RATE] IN BLOOD BY WESTERGREN METHOD: 29 MM/HR
HCT VFR BLD CALC: 39.7 %
HGB BLD-MCNC: 12.7 G/DL
HIV1+2 AB SPEC QL IA.RAPID: NONREACTIVE
IMM GRANULOCYTES NFR BLD AUTO: 0.4 %
INR PPP: 0.84 RATIO
LACEYELLA SACCHARI IGG: 5.9 MCG/ML
LYMPHOCYTES # BLD AUTO: 1.46 K/UL
LYMPHOCYTES NFR BLD AUTO: 15 %
M TB IFN-G BLD-IMP: NEGATIVE
MAN DIFF?: NORMAL
MCHC RBC-ENTMCNC: 31.8 PG
MCHC RBC-ENTMCNC: 32 G/DL
MCV RBC AUTO: 99.5 FL
MICROPOLYCAPG(M22): 2.9 MCG/ML
MONOCYTES # BLD AUTO: 0.55 K/UL
MONOCYTES NFR BLD AUTO: 5.6 %
NEUTROPHILS # BLD AUTO: 7.29 K/UL
NEUTROPHILS NFR BLD AUTO: 74.7 %
PENIC CHRYCAPG(M1): 10.4 MCG/ML
PHOMA BETAE IGG: 2.6 MCG/ML
PLATELET # BLD AUTO: 286 K/UL
PT BLD: 10 SEC
QUANTIFERON TB PLUS MITOGEN MINUS NIL: 1.25 IU/ML
QUANTIFERON TB PLUS NIL: 0.02 IU/ML
QUANTIFERON TB PLUS TB1 MINUS NIL: 0 IU/ML
QUANTIFERON TB PLUS TB2 MINUS NIL: 0 IU/ML
RBC # BLD: 3.99 M/UL
RBC # FLD: 14.4 %
RHEUMATOID FACT SERPL-ACNC: <10 IU/ML
TRICHODERMA VIRIDE IGG: 4.1 MCG/ML
WBC # FLD AUTO: 9.76 K/UL

## 2025-07-21 LAB — ANA TITR SER: NEGATIVE

## 2025-08-06 ENCOUNTER — APPOINTMENT (OUTPATIENT)
Dept: INTERVENTIONAL RADIOLOGY/VASCULAR | Facility: CLINIC | Age: 65
End: 2025-08-06
Payer: MEDICARE

## 2025-08-06 ENCOUNTER — NON-APPOINTMENT (OUTPATIENT)
Age: 65
End: 2025-08-06

## 2025-08-06 VITALS — WEIGHT: 175 LBS | HEIGHT: 70 IN | BODY MASS INDEX: 25.05 KG/M2

## 2025-08-06 DIAGNOSIS — R91.1 SOLITARY PULMONARY NODULE: ICD-10-CM

## 2025-08-06 PROCEDURE — 99204 OFFICE O/P NEW MOD 45 MIN: CPT | Mod: 2W

## 2025-08-06 RX ORDER — VALSARTAN AND HYDROCHLOROTHIAZIDE 320; 25 MG/1; MG/1
TABLET, FILM COATED ORAL
Refills: 0 | Status: ACTIVE | COMMUNITY

## 2025-08-11 ENCOUNTER — OUTPATIENT (OUTPATIENT)
Dept: OUTPATIENT SERVICES | Facility: HOSPITAL | Age: 65
LOS: 1 days | End: 2025-08-11
Payer: MEDICARE

## 2025-08-11 ENCOUNTER — TRANSCRIPTION ENCOUNTER (OUTPATIENT)
Age: 65
End: 2025-08-11

## 2025-08-11 ENCOUNTER — RESULT REVIEW (OUTPATIENT)
Age: 65
End: 2025-08-11

## 2025-08-11 VITALS
DIASTOLIC BLOOD PRESSURE: 68 MMHG | RESPIRATION RATE: 15 BRPM | SYSTOLIC BLOOD PRESSURE: 123 MMHG | OXYGEN SATURATION: 98 % | HEART RATE: 62 BPM

## 2025-08-11 VITALS
RESPIRATION RATE: 18 BRPM | OXYGEN SATURATION: 98 % | HEIGHT: 70 IN | HEART RATE: 71 BPM | SYSTOLIC BLOOD PRESSURE: 135 MMHG | TEMPERATURE: 97 F | WEIGHT: 175.05 LBS | DIASTOLIC BLOOD PRESSURE: 85 MMHG

## 2025-08-11 DIAGNOSIS — Z98.890 OTHER SPECIFIED POSTPROCEDURAL STATES: Chronic | ICD-10-CM

## 2025-08-11 DIAGNOSIS — Z95.828 PRESENCE OF OTHER VASCULAR IMPLANTS AND GRAFTS: Chronic | ICD-10-CM

## 2025-08-11 DIAGNOSIS — R91.1 SOLITARY PULMONARY NODULE: ICD-10-CM

## 2025-08-11 LAB
GRAM STN FLD: SIGNIFICANT CHANGE UP
SPECIMEN SOURCE: SIGNIFICANT CHANGE UP

## 2025-08-11 PROCEDURE — 71045 X-RAY EXAM CHEST 1 VIEW: CPT | Mod: 26

## 2025-08-11 PROCEDURE — 32408 CORE NDL BX LNG/MED PERQ: CPT

## 2025-08-11 PROCEDURE — 88312 SPECIAL STAINS GROUP 1: CPT | Mod: 26

## 2025-08-11 PROCEDURE — 88305 TISSUE EXAM BY PATHOLOGIST: CPT | Mod: 26

## 2025-08-11 RX ORDER — ATORVASTATIN CALCIUM 80 MG/1
1 TABLET, FILM COATED ORAL
Refills: 0 | DISCHARGE

## 2025-08-11 RX ORDER — BUDESONIDE AND FORMOTEROL FUMARATE DIHYDRATE 80; 4.5 UG/1; UG/1
2 AEROSOL RESPIRATORY (INHALATION)
Refills: 0 | DISCHARGE

## 2025-08-11 RX ORDER — SODIUM CHLORIDE 9 G/1000ML
1000 INJECTION, SOLUTION INTRAVENOUS
Refills: 0 | Status: DISCONTINUED | OUTPATIENT
Start: 2025-08-11 | End: 2025-08-25

## 2025-08-11 RX ORDER — ONDANSETRON HCL/PF 4 MG/2 ML
4 VIAL (ML) INJECTION ONCE
Refills: 0 | Status: DISCONTINUED | OUTPATIENT
Start: 2025-08-11 | End: 2025-08-25

## 2025-08-11 RX ORDER — FENTANYL CITRATE-0.9 % NACL/PF 100MCG/2ML
25 SYRINGE (ML) INTRAVENOUS
Refills: 0 | Status: DISCONTINUED | OUTPATIENT
Start: 2025-08-11 | End: 2025-08-11

## 2025-08-12 DIAGNOSIS — C16.9 MALIGNANT NEOPLASM OF STOMACH, UNSPECIFIED: ICD-10-CM

## 2025-08-12 LAB
ANION GAP SERPL CALC-SCNC: 14 MMOL/L
BUN SERPL-MCNC: 42 MG/DL
CALCIUM SERPL-MCNC: 9.2 MG/DL
CHLORIDE SERPL-SCNC: 102 MMOL/L
CO2 SERPL-SCNC: 21 MMOL/L
CREAT SERPL-MCNC: 1.74 MG/DL
EGFRCR SERPLBLD CKD-EPI 2021: 43 ML/MIN/1.73M2
GLUCOSE SERPL-MCNC: 123 MG/DL
NIGHT BLUE STAIN TISS: SIGNIFICANT CHANGE UP
POTASSIUM SERPL-SCNC: 4.2 MMOL/L
SODIUM SERPL-SCNC: 137 MMOL/L
SPECIMEN SOURCE: SIGNIFICANT CHANGE UP

## 2025-08-14 LAB — SURGICAL PATHOLOGY STUDY: SIGNIFICANT CHANGE UP

## 2025-08-16 LAB
CULTURE RESULTS: NO GROWTH — SIGNIFICANT CHANGE UP
SPECIMEN SOURCE: SIGNIFICANT CHANGE UP

## 2025-09-04 ENCOUNTER — APPOINTMENT (OUTPATIENT)
Dept: UROLOGY | Facility: CLINIC | Age: 65
End: 2025-09-04

## 2025-09-05 ENCOUNTER — APPOINTMENT (OUTPATIENT)
Dept: PULMONOLOGY | Facility: CLINIC | Age: 65
End: 2025-09-05
Payer: MEDICARE

## 2025-09-05 VITALS — DIASTOLIC BLOOD PRESSURE: 80 MMHG | OXYGEN SATURATION: 95 % | SYSTOLIC BLOOD PRESSURE: 115 MMHG | HEART RATE: 80 BPM

## 2025-09-05 DIAGNOSIS — J45.909 UNSPECIFIED ASTHMA, UNCOMPLICATED: ICD-10-CM

## 2025-09-05 DIAGNOSIS — R91.1 SOLITARY PULMONARY NODULE: ICD-10-CM

## 2025-09-05 DIAGNOSIS — D86.9 SARCOIDOSIS, UNSPECIFIED: ICD-10-CM

## 2025-09-05 PROCEDURE — G2211 COMPLEX E/M VISIT ADD ON: CPT

## 2025-09-05 PROCEDURE — 99214 OFFICE O/P EST MOD 30 MIN: CPT

## 2025-09-05 RX ORDER — PREDNISONE 10 MG/1
10 TABLET ORAL
Qty: 90 | Refills: 3 | Status: ACTIVE | COMMUNITY
Start: 2025-09-05 | End: 1900-01-01

## (undated) DEVICE — PACK IV START WITH CHG

## (undated) DEVICE — XI ARM PERMANENT CAUTERY SPATULA

## (undated) DEVICE — MARKING PEN W RULER / LABELS

## (undated) DEVICE — DRSG STERISTRIPS 0.5 X 4"

## (undated) DEVICE — POSITIONER FOAM EGG CRATE ULNAR 2PCS (PINK)

## (undated) DEVICE — DRAPE LARGE SHEET 72X85"

## (undated) DEVICE — DRSG MASTISOL

## (undated) DEVICE — SUT VICRYL 2-0 27" SH UNDYED

## (undated) DEVICE — XI ARM GRASPER TIP UP FENESTRATED

## (undated) DEVICE — TROCAR COVIDIEN VERSASTEP PLUS 15MM STANDARD

## (undated) DEVICE — SENSOR O2 FINGER ADULT

## (undated) DEVICE — POSITIONER FOAM HEAD CRADLE (PINK)

## (undated) DEVICE — TUBING SUCTION 20FT

## (undated) DEVICE — CATH BLLN ULTRASONIC ENSOSCOPE

## (undated) DEVICE — PACK ADVANCED LAPAROSCOPIC NS

## (undated) DEVICE — SYR SLIP 10CC

## (undated) DEVICE — SUCTION YANKAUER NO CONTROL VENT

## (undated) DEVICE — TUBING INSUFFLATION LAP FILTER 10FT

## (undated) DEVICE — FORCEP RADIAL JAW 4 JUMBO 2.8MM 3.2MM 240CM ORANGE DISP

## (undated) DEVICE — DRSG OPSITE 2.5 X 2"

## (undated) DEVICE — XI ARM FORCEP FENESTRATED BIPOLAR 8MM

## (undated) DEVICE — PACK MINOR WITH LAP

## (undated) DEVICE — BRUSH COLONOSCOPY CYTOLOGY

## (undated) DEVICE — SUT POLYSORB 0 60" TIES UNDYED

## (undated) DEVICE — SOL INJ NS 0.9% 500ML 2 PORT

## (undated) DEVICE — TIP METZENBAUM SCISSOR MONOPOLAR ENDOCUT (ORANGE)

## (undated) DEVICE — NDL COUNTER FOAM AND MAGNET 40-70

## (undated) DEVICE — DRAPE MAYO STAND 23"

## (undated) DEVICE — DRAPE MAYO STAND 30"

## (undated) DEVICE — BALLOON US ENDO

## (undated) DEVICE — NDL HYPO SAFE 22G X 1.5" (BLACK)

## (undated) DEVICE — TUBING IV SET GRAVITY 3Y 100" MACRO

## (undated) DEVICE — VALVE BIOPSY BRONCHOVIDEOSCOPE

## (undated) DEVICE — DRAIN PENROSE .25" X 12" SILICONE

## (undated) DEVICE — Device

## (undated) DEVICE — BASIN SET DOUBLE

## (undated) DEVICE — SUT VICRYL 0 27" UR-6

## (undated) DEVICE — DRAPE IOBAN 23" X 23"

## (undated) DEVICE — CATH IV SAFE BC 20G X 1.16" (PINK)

## (undated) DEVICE — SYR LUER LOK 10CC

## (undated) DEVICE — LIGASURE MARYLAND 37CM

## (undated) DEVICE — CATH IV SAFE BC 22G X 1" (BLUE)

## (undated) DEVICE — BASIN SET SINGLE

## (undated) DEVICE — XI SHEARS HARMONIC CVD 8MM

## (undated) DEVICE — SUT MONOCRYL 4-0 27" PS-2 UNDYED

## (undated) DEVICE — DRAPE COVER SNAP 36X30"

## (undated) DEVICE — SUT POLYSORB 0 36" GU-46

## (undated) DEVICE — D HELP - CLEARVIEW CLEARIFY SYSTEM

## (undated) DEVICE — NDL HYPO SAFE 21G X 1.5" (GREEN)

## (undated) DEVICE — BIOPSY FORCEP RADIAL JAW 4 STANDARD WITH NEEDLE

## (undated) DEVICE — DRAPE INSTRUMENT POUCH 6.75" X 11"

## (undated) DEVICE — SOL IRR POUR H2O 250ML

## (undated) DEVICE — DRSG BENZOIN 0.6CC

## (undated) DEVICE — GLV 7.5 PROTEXIS (WHITE)

## (undated) DEVICE — CLAMP BX HOT RAD JAW 3

## (undated) DEVICE — NDL INJ SCLERO INTERJECT 23G

## (undated) DEVICE — SUT SOFSILK 3-0 30" V-20

## (undated) DEVICE — TUBING SUCTION NONCONDUCTIVE 6MM X 12FT

## (undated) DEVICE — DRSG CURITY GAUZE SPONGE 4 X 4" 12-PLY

## (undated) DEVICE — ENDOCATCH 10MM SPECIMEN POUCH

## (undated) DEVICE — TROCAR ETHICON ENDOPATH RIGID THORACIC 10/12MM X 75MM RIDGID

## (undated) DEVICE — XI SEAL UNIV 5- 8 MM

## (undated) DEVICE — SUT BIOSYN 4-0 18" P-12

## (undated) DEVICE — TROCAR COVIDIEN VERSAPORT BLADELESS OPTICAL 5MM STANDARD

## (undated) DEVICE — TROCAR COVIDIEN BLUNT TIP HASSAN 10MM

## (undated) DEVICE — DRSG TEGADERM 4X4.75"

## (undated) DEVICE — GLV 6.5 PROTEXIS (WHITE)

## (undated) DEVICE — DRAPE LIGHT HANDLE COVER (BLUE)

## (undated) DEVICE — MEDICATION LABELS W MARKER

## (undated) DEVICE — XI NEEDLE DRIVER LARGE

## (undated) DEVICE — VALVE SUCTION EVIS 160/200/240

## (undated) DEVICE — WARMING BLANKET LOWER ADULT

## (undated) DEVICE — GOWN LG

## (undated) DEVICE — XI ARM PERMANENT CAUTERY HOOK

## (undated) DEVICE — SOL INJ LR 1000ML

## (undated) DEVICE — STAPLER COVIDIEN ENDO GIA STANDARD HANDLE

## (undated) DEVICE — TUBING SUCTION CONN 6FT STERILE

## (undated) DEVICE — POLY TRAP ETRAP

## (undated) DEVICE — POSITIONER STRAP ARMBOARD VELCRO TS-30

## (undated) DEVICE — SOL IRR POUR NS 0.9% 500ML

## (undated) DEVICE — BAG BIOHAZARD SPECIMAN 15X17

## (undated) DEVICE — SUT VICRYL 3-0 27" SH UNDYED

## (undated) DEVICE — ELCTR GROUNDING PAD ADULT COVIDIEN

## (undated) DEVICE — XI DRAPE ARM

## (undated) DEVICE — DRAIN PENROSE .25" X 18" LATEX

## (undated) DEVICE — TUBING IV SET MICROCLAVE ADAPTER

## (undated) DEVICE — DRAPE MAGNETIC INSTRUMENT MEDIUM

## (undated) DEVICE — LABELS BLANK W PEN

## (undated) DEVICE — SUT PROLENE 2-0 30" CT-1

## (undated) DEVICE — XI ARM FORCEP MARYLAND BIPOLAR

## (undated) DEVICE — DRAIN RESERVOIR FOR JACKSON PRATT 100CC CARDINAL

## (undated) DEVICE — SUT VLOC 180 3-0 6" V-20 GREEN

## (undated) DEVICE — FOLEY HOLDER STATLOCK 2 WAY ADULT

## (undated) DEVICE — SYR ALLIANCE II INFLATION 60ML

## (undated) DEVICE — DRAPE LAPAROTOMY TRANSVERSE

## (undated) DEVICE — TUBING HYDRO-SURG PLUS IRRIGATOR W SMOKEVAC & PROBE

## (undated) DEVICE — FOLEY TRAY 16FR 5CC LF UMETER CLOSED

## (undated) DEVICE — POSITIONER PINK PAD PIGAZZI SYSTEM

## (undated) DEVICE — TROCAR COVIDIEN BLUNT TIP HASSAN 10MM STANDARD

## (undated) DEVICE — SPECIMEN CONTAINER 100ML

## (undated) DEVICE — SUT SOFSILK 3-0 18" V-20 (POP-OFF)

## (undated) DEVICE — SUT SOFSILK 2-0 18" V-20 (POP-OFF)

## (undated) DEVICE — XI ARM NEEDLE DRIVER SUTURECUT MEGA 8MM

## (undated) DEVICE — XI ARM FORCEP PROGRASP 8MM

## (undated) DEVICE — GLV 8 PROTEXIS (WHITE)

## (undated) DEVICE — VENODYNE/SCD SLEEVE CALF MEDIUM

## (undated) DEVICE — DRAIN JACKSON PRATT 10MM FLAT FULL NO TROCAR

## (undated) DEVICE — XI DRAPE COLUMN

## (undated) DEVICE — GOWN XL

## (undated) DEVICE — SYR LUER LOK 50CC

## (undated) DEVICE — DRAPE BACK TABLE COVER HEAVY DUTY 60"

## (undated) DEVICE — GLV 7 PROTEXIS (WHITE)

## (undated) DEVICE — ELCTR BOVIE TIP BLADE INSULATED 6.5" EDGE

## (undated) DEVICE — PREP CHLORAPREP HI-LITE ORANGE 26ML

## (undated) DEVICE — PACK BASIN SPECIAL PROCEDURE

## (undated) DEVICE — XI ARM SCISSOR MONO CURVED

## (undated) DEVICE — BLADE SCALPEL SAFETYLOCK #10

## (undated) DEVICE — DISSECTOR ENDOSCOPIC KITTNER SINGLE TIP

## (undated) DEVICE — XI ARM FORCEP TENACULUM

## (undated) DEVICE — BLADE SURGICAL #15 CARBON

## (undated) DEVICE — SCOPE WARMER SEAL DISP

## (undated) DEVICE — CHEST DRAIN OASIS DRY SUCTION WATER SEAL

## (undated) DEVICE — XI ARM DISSECTOR CURVED BIPOLAR 8MM

## (undated) DEVICE — SUT PDS II 1 48" TP-1

## (undated) DEVICE — WARMING BLANKET UPPER ADULT

## (undated) DEVICE — MARKER ENDO SPOT EX

## (undated) DEVICE — GLV 8.5 PROTEXIS (WHITE)

## (undated) DEVICE — XI VESSEL SEALER

## (undated) DEVICE — PACK MAJOR ABDOMINAL W ENDO DRAPE

## (undated) DEVICE — SUT SOFSILK 2-0 18" C-23

## (undated) DEVICE — ELCTR BOVIE TIP BLADE INSULATED 2.75" EDGE

## (undated) DEVICE — SUT POLYSORB 3-0 30" V-20 UNDYED

## (undated) DEVICE — DRSG TELFA 3 X 8

## (undated) DEVICE — POSITIONER PURPLE ARM ONE STEP (LARGE)

## (undated) DEVICE — ANESTHESIA CIRCUIT ADULT HMEF

## (undated) DEVICE — XI ARM CLIP APPLIER MEDIUM-LARGE

## (undated) DEVICE — MEDICINE CUP 3.5 OZ

## (undated) DEVICE — SOL ANTI FOG

## (undated) DEVICE — SUT SOFSILK 2-0 30" V-20

## (undated) DEVICE — TAPE SILK 3"

## (undated) DEVICE — SENSOR O2 FINGER ADULT 24/CA

## (undated) DEVICE — BITE BLOCK ADULT 20 X 27MM (GREEN)

## (undated) DEVICE — PACK ROBOTIC LIJ

## (undated) DEVICE — SYR LUER LOK 20CC

## (undated) DEVICE — TUBING AIRSEAL TRI-LUMEN FILTERED

## (undated) DEVICE — IRRIGATOR BIO SHIELD

## (undated) DEVICE — XI TIP COVER

## (undated) DEVICE — XI ARM CLIP APPLIER LARGE

## (undated) DEVICE — ENDOCATCH II 15MM

## (undated) DEVICE — FOLEY TRAY 16FR 5CC LF LUBRISIL ADVANCE TEMP CLOSED

## (undated) DEVICE — ELCTR REM POLYHESIVE ADULT PT RETURN 15FT

## (undated) DEVICE — LIGASURE IMPACT

## (undated) DEVICE — ENDOCATCH GENERAL 10MM (PURPLE)

## (undated) DEVICE — SUT PROLENE 0 30" CT-1

## (undated) DEVICE — XI OBTURATOR OPTICAL BLADELESS 8MM

## (undated) DEVICE — SUT MAXON 1 96" T-60

## (undated) DEVICE — TROCAR SURGIQUEST AIRSEAL 12MMX100MM

## (undated) DEVICE — MARKING PEN DEVON W RULER

## (undated) DEVICE — BLADE SCALPEL SAFETYLOCK #15